# Patient Record
Sex: FEMALE | Race: BLACK OR AFRICAN AMERICAN | Employment: OTHER | URBAN - METROPOLITAN AREA
[De-identification: names, ages, dates, MRNs, and addresses within clinical notes are randomized per-mention and may not be internally consistent; named-entity substitution may affect disease eponyms.]

---

## 2019-02-10 ENCOUNTER — APPOINTMENT (OUTPATIENT)
Dept: GENERAL RADIOLOGY | Age: 84
End: 2019-02-10
Attending: EMERGENCY MEDICINE
Payer: MEDICARE

## 2019-02-10 ENCOUNTER — HOSPITAL ENCOUNTER (EMERGENCY)
Age: 84
Discharge: HOME | End: 2019-02-10
Attending: EMERGENCY MEDICINE
Payer: MEDICARE

## 2019-02-10 VITALS
OXYGEN SATURATION: 100 % | DIASTOLIC BLOOD PRESSURE: 46 MMHG | WEIGHT: 249 LBS | HEART RATE: 79 BPM | SYSTOLIC BLOOD PRESSURE: 129 MMHG | TEMPERATURE: 99.3 F | BODY MASS INDEX: 40.02 KG/M2 | HEIGHT: 66 IN | RESPIRATION RATE: 16 BRPM

## 2019-02-10 DIAGNOSIS — M10.9 ACUTE GOUT OF RIGHT KNEE, UNSPECIFIED CAUSE: Primary | ICD-10-CM

## 2019-02-10 LAB
ALBUMIN SERPL-MCNC: 2.8 G/DL (ref 3.5–5)
ALBUMIN/GLOB SERPL: 0.5 {RATIO} (ref 1.1–2.2)
ALP SERPL-CCNC: 84 U/L (ref 45–117)
ALT SERPL-CCNC: 14 U/L (ref 12–78)
ANION GAP SERPL CALC-SCNC: 9 MMOL/L (ref 5–15)
APPEARANCE SNV: ABNORMAL
AST SERPL-CCNC: 12 U/L (ref 15–37)
BASOPHILS # BLD: 0 K/UL (ref 0–0.1)
BASOPHILS NFR BLD: 0 % (ref 0–1)
BILIRUB SERPL-MCNC: 0.5 MG/DL (ref 0.2–1)
BODY FLD TYPE: NORMAL
BUN SERPL-MCNC: 22 MG/DL (ref 6–20)
BUN/CREAT SERPL: 22 (ref 12–20)
CALCIUM SERPL-MCNC: 8.3 MG/DL (ref 8.5–10.1)
CHLORIDE SERPL-SCNC: 104 MMOL/L (ref 97–108)
CO2 SERPL-SCNC: 25 MMOL/L (ref 21–32)
COLOR SNV: YELLOW
CREAT SERPL-MCNC: 1 MG/DL (ref 0.55–1.02)
CRP SERPL HS-MCNC: >9.5 MG/L
CRYSTALS FLD MICRO: ABNORMAL
CRYSTALS FLD MICRO: NORMAL
DIFFERENTIAL METHOD BLD: ABNORMAL
EOSINOPHIL # BLD: 0 K/UL (ref 0–0.4)
EOSINOPHIL NFR BLD: 0 % (ref 0–7)
ERYTHROCYTE [DISTWIDTH] IN BLOOD BY AUTOMATED COUNT: 21.6 % (ref 11.5–14.5)
ERYTHROCYTE [SEDIMENTATION RATE] IN BLOOD: 71 MM/HR (ref 0–30)
GLOBULIN SER CALC-MCNC: 5.3 G/DL (ref 2–4)
GLUCOSE BLD STRIP.AUTO-MCNC: 191 MG/DL (ref 65–100)
GLUCOSE FLD-MCNC: 16 MG/DL
GLUCOSE SERPL-MCNC: 177 MG/DL (ref 65–100)
HCT VFR BLD AUTO: 25.7 % (ref 35–47)
HGB BLD-MCNC: 8.3 G/DL (ref 11.5–16)
IMM GRANULOCYTES # BLD AUTO: 0.1 K/UL (ref 0–0.04)
IMM GRANULOCYTES NFR BLD AUTO: 1 % (ref 0–0.5)
LYMPHOCYTES # BLD: 0.7 K/UL (ref 0.8–3.5)
LYMPHOCYTES NFR BLD: 5 % (ref 12–49)
LYMPHOCYTES NFR SNV MANUAL: 2 % (ref 0–74)
MCH RBC QN AUTO: 21 PG (ref 26–34)
MCHC RBC AUTO-ENTMCNC: 32.3 G/DL (ref 30–36.5)
MCV RBC AUTO: 64.9 FL (ref 80–99)
MONOCYTES # BLD: 0.8 K/UL (ref 0–1)
MONOCYTES NFR BLD: 6 % (ref 5–13)
MONOCYTES NFR SNV MANUAL: 10 % (ref 0–69)
NEUTROPHILS NFR SNV MANUAL: 88 % (ref 0–24)
NEUTS SEG # BLD: 11.7 K/UL (ref 1.8–8)
NEUTS SEG NFR BLD: 88 % (ref 32–75)
NRBC # BLD: 0 K/UL (ref 0–0.01)
NRBC BLD-RTO: 0 PER 100 WBC
PLATELET # BLD AUTO: 293 K/UL (ref 150–400)
POTASSIUM SERPL-SCNC: 4 MMOL/L (ref 3.5–5.1)
PROCALCITONIN SERPL-MCNC: 0.1 NG/ML
PROT FLD-MCNC: 4.6 G/DL
PROT SERPL-MCNC: 8.1 G/DL (ref 6.4–8.2)
RBC # BLD AUTO: 3.96 M/UL (ref 3.8–5.2)
RBC # SNV: >100 /CU MM
RBC MORPH BLD: ABNORMAL
SERVICE CMNT-IMP: ABNORMAL
SODIUM SERPL-SCNC: 138 MMOL/L (ref 136–145)
SPECIMEN SOURCE FLD: ABNORMAL
URATE FLD-MCNC: 10.3 MG/DL
WBC # BLD AUTO: 13.3 K/UL (ref 3.6–11)
WBC # SNV: ABNORMAL /CU MM (ref 0–150)

## 2019-02-10 PROCEDURE — 89050 BODY FLUID CELL COUNT: CPT

## 2019-02-10 PROCEDURE — 99284 EMERGENCY DEPT VISIT MOD MDM: CPT

## 2019-02-10 PROCEDURE — 73562 X-RAY EXAM OF KNEE 3: CPT

## 2019-02-10 PROCEDURE — 80053 COMPREHEN METABOLIC PANEL: CPT

## 2019-02-10 PROCEDURE — 85025 COMPLETE CBC W/AUTO DIFF WBC: CPT

## 2019-02-10 PROCEDURE — 86141 C-REACTIVE PROTEIN HS: CPT

## 2019-02-10 PROCEDURE — 84560 ASSAY OF URINE/URIC ACID: CPT

## 2019-02-10 PROCEDURE — 84145 PROCALCITONIN (PCT): CPT

## 2019-02-10 PROCEDURE — 75810000054 HC ARTHOCENTISIS JOINT

## 2019-02-10 PROCEDURE — 74011250637 HC RX REV CODE- 250/637: Performed by: EMERGENCY MEDICINE

## 2019-02-10 PROCEDURE — 82945 GLUCOSE OTHER FLUID: CPT

## 2019-02-10 PROCEDURE — 82962 GLUCOSE BLOOD TEST: CPT

## 2019-02-10 PROCEDURE — 74011250636 HC RX REV CODE- 250/636: Performed by: EMERGENCY MEDICINE

## 2019-02-10 PROCEDURE — 96374 THER/PROPH/DIAG INJ IV PUSH: CPT

## 2019-02-10 PROCEDURE — 85652 RBC SED RATE AUTOMATED: CPT

## 2019-02-10 PROCEDURE — 87077 CULTURE AEROBIC IDENTIFY: CPT

## 2019-02-10 PROCEDURE — 87205 SMEAR GRAM STAIN: CPT

## 2019-02-10 PROCEDURE — 87186 SC STD MICRODIL/AGAR DIL: CPT

## 2019-02-10 PROCEDURE — 89060 EXAM SYNOVIAL FLUID CRYSTALS: CPT

## 2019-02-10 PROCEDURE — 36415 COLL VENOUS BLD VENIPUNCTURE: CPT

## 2019-02-10 RX ORDER — OXYCODONE HYDROCHLORIDE 5 MG/1
TABLET ORAL
Qty: 12 TAB | Refills: 0 | Status: SHIPPED | OUTPATIENT
Start: 2019-02-10 | End: 2019-03-28

## 2019-02-10 RX ORDER — HYDROCHLOROTHIAZIDE 25 MG/1
25 TABLET ORAL DAILY
COMMUNITY
End: 2019-03-28

## 2019-02-10 RX ORDER — MORPHINE SULFATE 2 MG/ML
4 INJECTION, SOLUTION INTRAMUSCULAR; INTRAVENOUS ONCE
Status: COMPLETED | OUTPATIENT
Start: 2019-02-10 | End: 2019-02-10

## 2019-02-10 RX ORDER — CARVEDILOL 25 MG/1
25 TABLET ORAL 2 TIMES DAILY WITH MEALS
COMMUNITY

## 2019-02-10 RX ORDER — LIDOCAINE HYDROCHLORIDE AND EPINEPHRINE 10; 10 MG/ML; UG/ML
1.5 INJECTION, SOLUTION INFILTRATION; PERINEURAL AS NEEDED
Status: DISCONTINUED | OUTPATIENT
Start: 2019-02-10 | End: 2019-02-10 | Stop reason: HOSPADM

## 2019-02-10 RX ORDER — OXYCODONE HYDROCHLORIDE 5 MG/1
5 TABLET ORAL
Status: COMPLETED | OUTPATIENT
Start: 2019-02-10 | End: 2019-02-10

## 2019-02-10 RX ORDER — LISINOPRIL 40 MG/1
40 TABLET ORAL DAILY
COMMUNITY
End: 2019-03-28

## 2019-02-10 RX ORDER — DICLOFENAC SODIUM 10 MG/G
2 GEL TOPICAL 4 TIMES DAILY
Qty: 100 G | Refills: 0 | Status: SHIPPED | OUTPATIENT
Start: 2019-02-10

## 2019-02-10 RX ADMIN — MORPHINE SULFATE 4 MG: 2 INJECTION, SOLUTION INTRAMUSCULAR; INTRAVENOUS at 09:52

## 2019-02-10 RX ADMIN — OXYCODONE HYDROCHLORIDE 5 MG: 5 TABLET ORAL at 11:47

## 2019-02-10 NOTE — ED PROVIDER NOTES
EMERGENCY DEPARTMENT HISTORY AND PHYSICAL EXAM 
 
 
Date: 2/10/2019 Patient Name: Natanael Win History of Presenting Illness Chief Complaint Patient presents with  Leg Pain R leg x 2 days History Provided By: Patient HPI: Natanael Win, 80 y.o. female with PMHx significant for HLD, DM, and MI,  Presents via wheelchair to the ED with cc of worsening R leg pain and knee pain x 3days. Pt states she normally uses a cane but is unable to walk today due to the pain. Pt states she was seen by [facility] and had an US of her leg yesterday which showed no clot. Pt was given hydrocodone for her pain but it has provided little relief. Pt denies any TORO. Pt denies any numbness, weakness, fever, or chills. Pt denies history of gout. There are no other complaints, changes, or physical findings at this time. PCP: Dorothea Infante NP No current facility-administered medications on file prior to encounter. Current Outpatient Medications on File Prior to Encounter Medication Sig Dispense Refill  carvedilol (COREG) 25 mg tablet Take 25 mg by mouth two (2) times daily (with meals).  lisinopril (PRINIVIL, ZESTRIL) 40 mg tablet Take 40 mg by mouth daily.  amlodipine besylate/benazepril (AMLODIPINE-BENAZEPRIL PO) Take  by mouth.  hydroCHLOROthiazide (HYDRODIURIL) 25 mg tablet Take 25 mg by mouth daily.  insulin lispro protamine/insulin lispro (HUMALOG MIX 75/25) 100 unit/mL (75-25) injection 30 Units by SubCUTAneous route Before breakfast and dinner. 1 Vial 0  
 atorvastatin (LIPITOR) 20 mg tablet Take 20 mg by mouth daily.  levothyroxine (SYNTHROID) 175 mcg tablet Take 175 mcg by mouth Daily (before breakfast).  aspirin delayed-release 81 mg tablet Take 81 mg by mouth daily. Past History Past Medical History: 
Past Medical History:  
Diagnosis Date  Diabetes ([address]  Heart attack [facility]) 2009  Hyperlipidemia  Hypertension Past Surgical History: 
Past Surgical History:  
Procedure Laterality Date  HX THYROIDECTOMY  2005 Family History: 
Family History Problem Relation Age of Onset  Cancer Mother  Cancer Father  Cancer Sister  Heart Disease Sister  Diabetes Other   
     multiple family members Social History: 
Social History Tobacco Use  Smoking status: Never Smoker Substance Use Topics  Alcohol use: No  
 Drug use: No  
 
 
Allergies: 
No Known Allergies Review of Systems Review of Systems Constitutional: Negative for chills and fever. HENT: Negative for congestion and sore throat. Eyes: Negative for visual disturbance. Respiratory: Negative for cough and shortness of breath. Cardiovascular: Negative for chest pain and leg swelling. Gastrointestinal: Negative for abdominal pain, blood in stool, diarrhea and nausea. Endocrine: Negative for polyuria. Genitourinary: Negative for dysuria, flank pain, vaginal bleeding and vaginal discharge. Musculoskeletal: Positive for arthralgias (R leg) and gait problem. Negative for myalgias. Skin: Negative for rash. Allergic/Immunologic: Negative for immunocompromised state. Neurological: Negative for weakness and headaches. Psychiatric/Behavioral: Negative for confusion. Physical Exam  
Physical Exam  
Constitutional: She is oriented to person, place, and time. She appears well-developed and well-nourished. HENT:  
Head: Normocephalic and atraumatic. Moist mucous membranes Eyes: Conjunctivae are normal. Pupils are equal, round, and reactive to light. Right eye exhibits no discharge. Left eye exhibits no discharge. Neck: Normal range of motion. Neck supple. No tracheal deviation present. Cardiovascular: Normal rate, regular rhythm and normal heart sounds. No murmur heard.  
Pulmonary/Chest: Effort normal and breath sounds normal. No respiratory distress. She has no wheezes. She has no rales. Abdominal: Soft. Bowel sounds are normal. There is no tenderness. There is no rebound and no guarding. Musculoskeletal: Normal range of motion. She exhibits no edema, tenderness or deformity. +Effusion of R knee Limited ROM Pulses in DP and PT on right obtained via US. 1+ DP and PT pulses in L Right Knee is hypersensitive to light touch Skin is warm but not erythematous\ Feet are warm with normal capillary refill. Neurological: She is alert and oriented to person, place, and time. Skin: Skin is warm and dry. No rash noted. No erythema. Psychiatric: Her behavior is normal.  
Nursing note and vitals reviewed. Diagnostic Study Results Labs - Recent Results (from the past 12 hour(s)) CBC WITH AUTOMATED DIFF Collection Time: 02/10/19  8:19 AM  
Result Value Ref Range WBC 13.3 (H) 3.6 - 11.0 K/uL  
 RBC 3.96 3.80 - 5.20 M/uL HGB 8.3 (L) 11.5 - 16.0 g/dL HCT 25.7 (L) 35.0 - 47.0 % MCV 64.9 (L) 80.0 - 99.0 FL  
 MCH 21.0 (L) 26.0 - 34.0 PG  
 MCHC 32.3 30.0 - 36.5 g/dL RDW 21.6 (H) 11.5 - 14.5 % PLATELET 110 439 - 604 K/uL NRBC 0.0 0  WBC ABSOLUTE NRBC 0.00 0.00 - 0.01 K/uL NEUTROPHILS 88 (H) 32 - 75 % LYMPHOCYTES 5 (L) 12 - 49 % MONOCYTES 6 5 - 13 % EOSINOPHILS 0 0 - 7 % BASOPHILS 0 0 - 1 % IMMATURE GRANULOCYTES 1 (H) 0.0 - 0.5 % ABS. NEUTROPHILS 11.7 (H) 1.8 - 8.0 K/UL  
 ABS. LYMPHOCYTES 0.7 (L) 0.8 - 3.5 K/UL  
 ABS. MONOCYTES 0.8 0.0 - 1.0 K/UL  
 ABS. EOSINOPHILS 0.0 0.0 - 0.4 K/UL  
 ABS. BASOPHILS 0.0 0.0 - 0.1 K/UL  
 ABS. IMM. GRANS. 0.1 (H) 0.00 - 0.04 K/UL  
 DF AUTOMATED    
 RBC COMMENTS ANISOCYTOSIS 2+ 
    
 RBC COMMENTS MICROCYTOSIS 2+ 
    
 RBC COMMENTS TARGET CELLS 2+ 
    
 RBC COMMENTS SCHISTOCYTES PRESENT 
    
SED RATE (ESR) Collection Time: 02/10/19  8:19 AM  
Result Value Ref Range Sed rate, automated 71 (H) 0 - 30 mm/hr CRYSTALS, SYNOVIAL FLUID  
 Collection Time: 02/10/19  8:19 AM  
Result Value Ref Range FLUID TYPE(7) SYNOVIAL FLUID Crystals, body fluid MODEATE INTRACELLULAR URIC ACID CRYSTALS SEEN WITH POLARIZED LIGHT  
CULTURE, BODY FLUID W GRAM STAIN Collection Time: 02/10/19  8:19 AM  
Result Value Ref Range Special Requests: NO SPECIAL REQUESTS    
 GRAM STAIN OCCASIONAL WBCS SEEN    
 GRAM STAIN NO ORGANISMS SEEN Culture result: PENDING   
SYNOVIAL FLUID 1 Collection Time: 02/10/19  8:19 AM  
Result Value Ref Range Glucose, body fld. 16 MG/DL Crystals, body fluid DUPLICATE REQUEST Protein total, body fld. 4.6 g/dL Uric acid, fluid 10.3 MG/DL  
 SYNOVIAL FLD SITE synovial   
 SYN FLUID COLOR YELLOW    
 SYN FLUID APPEARANCE CLOUDY SYNOVIAL FLD RBC CT >100 (H) 0 /cu mm SYNOVIAL FLD WBC CT 36,211 (H) 0 - 150 /cu mm SYNOVIAL FLD SEGS 88 (H) 0 - 24 % SYNOVIAL FLD LYMPHS 2 0 - 74 % SYNOVIAL FLD MONOS 10 0 - 69 % METABOLIC PANEL, COMPREHENSIVE Collection Time: 02/10/19 11:31 AM  
Result Value Ref Range Sodium 138 136 - 145 mmol/L Potassium 4.0 3.5 - 5.1 mmol/L Chloride 104 97 - 108 mmol/L  
 CO2 25 21 - 32 mmol/L Anion gap 9 5 - 15 mmol/L Glucose 177 (H) 65 - 100 mg/dL BUN 22 (H) 6 - 20 MG/DL Creatinine 1.00 0.55 - 1.02 MG/DL  
 BUN/Creatinine ratio 22 (H) 12 - 20 GFR est AA >60 >60 ml/min/1.73m2 GFR est non-AA 53 (L) >60 ml/min/1.73m2 Calcium 8.3 (L) 8.5 - 10.1 MG/DL Bilirubin, total 0.5 0.2 - 1.0 MG/DL  
 ALT (SGPT) 14 12 - 78 U/L  
 AST (SGOT) 12 (L) 15 - 37 U/L Alk. phosphatase 84 45 - 117 U/L Protein, total 8.1 6.4 - 8.2 g/dL Albumin 2.8 (L) 3.5 - 5.0 g/dL Globulin 5.3 (H) 2.0 - 4.0 g/dL A-G Ratio 0.5 (L) 1.1 - 2.2 GLUCOSE, POC Collection Time: 02/10/19 12:45 PM  
Result Value Ref Range Glucose (POC) 191 (H) 65 - 100 mg/dL Performed by Tyree Muir Radiologic Studies -  
XR KNEE RT 3 V Final Result IMPRESSION: Trace joint effusion. Significant degenerative changes. CT Results  (Last 48 hours) None CXR Results  (Last 48 hours) None Medical Decision Making I am the first provider for this patient. I reviewed the vital signs, available nursing notes, past medical history, past surgical history, family history and social history. Vital Signs-Reviewed the patient's vital signs. Patient Vitals for the past 12 hrs: 
 Temp Pulse Resp BP SpO2  
02/10/19 1130    165/57 98 % 02/10/19 1100    154/60 100 % 02/10/19 1030    176/56 99 % 02/10/19 1011    183/62 98 % 02/10/19 0800    166/57 100 % 02/10/19 0730    175/56 98 % 02/10/19 0715    165/59 98 % 02/10/19 0703 99.7 °F (37.6 °C) 71 16 171/56 99 % Pulse Oximetry Analysis - 99% on RA Cardiac Monitor:  
Rate: 71 bpm  
 
Records Reviewed: Nursing Notes and Old Medical Records Provider Notes (Medical Decision Making): DDx: Gout, OA, Septic Arthritis ED Course:  
Initial assessment performed. The patients presenting problems have been discussed, and they are in agreement with the care plan formulated and outlined with them. I have encouraged them to ask questions as they arise throughout their visit. Procedure Note - Right Knee Arthrocentesis:  
9:27 AM 
Performed by [employer], DO. Immediately prior to the procedure, the patient was reevaluated and found suitable for the planned procedure and any planned medications. Immediately prior to the procedure a time out was called to verify the correct patient, procedure, equipment, staff, and marking as appropriate. Indication for procedure: Unexplained joint effusion Approach: medial 
The site prepped with Betadine. Sterile field established. Anesthesia was obtained with 5mLs of Lidocaine 1% with epinephrine.  knee joint was entered, using a 18 gauge needle, and 25 cc's of straw colored/turbid fluid was withdrawn and sent for aerobic culture, anaerobic culture, cell count & diff, crystal analysis and gram stain. Estimated blood loss: 1cc The procedure took 1-15 minutes, and pt tolerated well. 200 Discussed synovial fluid with Dr. Rufina Rae who will discuss with Dr. Catherine Jang. Consultant believes fluid likely inflammatory from gout. 1:46 PM 
Synovial fluid was analyzed, trace WBCs but no microorganisms found. Critical Care Time:  
0 Disposition: 
DISCHARGE NOTE 
1:50 PM 
The patient has been re-evaluated and is ready for discharge. Reviewed available results with patient. Counseled pt on diagnosis and care plan. Pt has expressed understanding, and all questions have been answered. Pt agrees with plan and agrees to F/U as recommended, or return to the ED if their sxs worsen. Discharge instructions have been provided and explained to the pt, along with reasons to return to the ED. PLAN: 
1. Current Discharge Medication List  
  
 
2. Follow-up Information None Return to ED if worse Diagnosis Clinical Impression: 1. Acute gout of right knee, unspecified cause Attestations: This note is prepared by Jaden Scott, acting as Scribe for Micaela Dickson DO. The scribe's documentation has been prepared under my direction and personally reviewed by me in its entirety.  I confirm that the note above accurately reflects all work, treatment, procedures, and medical decision making performed by me, Micaela Dickson DO

## 2019-02-10 NOTE — DISCHARGE INSTRUCTIONS
Patient Education        Gout: Care Instructions  Your Care Instructions    Gout is a form of arthritis caused by a buildup of uric acid crystals in a joint. It causes sudden attacks of pain, swelling, redness, and stiffness, usually in one joint, especially the big toe. Gout usually comes on without a cause. But it can be brought on by drinking alcohol (especially beer) or eating seafood and red meat. Taking certain medicines, such as diuretics or aspirin, also can bring on an attack of gout. Taking your medicines as prescribed and following up with your doctor regularly can help you avoid gout attacks in the future. Follow-up care is a key part of your treatment and safety. Be sure to make and go to all appointments, and call your doctor if you are having problems. It's also a good idea to know your test results and keep a list of the medicines you take. How can you care for yourself at home? · If the joint is swollen, put ice or a cold pack on the area for 10 to 20 minutes at a time. Put a thin cloth between the ice and your skin. · Prop up the sore limb on a pillow when you ice it or anytime you sit or lie down during the next 3 days. Try to keep it above the level of your heart. This will help reduce swelling. · Rest sore joints. Avoid activities that put weight or strain on the joints for a few days. Take short rest breaks from your regular activities during the day. · Take your medicines exactly as prescribed. Call your doctor if you think you are having a problem with your medicine. · Take pain medicines exactly as directed. ? If the doctor gave you a prescription medicine for pain, take it as prescribed. ? If you are not taking a prescription pain medicine, ask your doctor if you can take an over-the-counter medicine. · Eat less seafood and red meat. · Check with your doctor before drinking alcohol. · Losing weight, if you are overweight, may help reduce attacks of gout.  But do not go on a \"crash diet. \" Losing a lot of weight in a short amount of time can cause a gout attack. When should you call for help? Call your doctor now or seek immediate medical care if:    · You have a fever.     · The joint is so painful you cannot use it.     · You have sudden, unexplained swelling, redness, warmth, or severe pain in one or more joints.    Watch closely for changes in your health, and be sure to contact your doctor if:    · You have joint pain.     · Your symptoms get worse or are not improving after 2 or 3 days. Where can you learn more? Go to http://miguel angel-kishan.info/. Enter W163 in the search box to learn more about \"Gout: Care Instructions. \"  Current as of: Carla 10, 2018  Content Version: 11.9  © 8941-7305 [employer]. Care instructions adapted under license by [employer] (which disclaims liability or warranty for this information). If you have questions about a medical condition or this instruction, always ask your healthcare professional. [address] any warranty or liability for your use of this information.

## 2019-02-10 NOTE — ED NOTES
Patient discharged by Dr. Shorty Beaulieu. Patient provided with discharge instructions Rx and instructions on follow up care. Patient out of ED via wheelchair accompanied by family.

## 2019-02-10 NOTE — ED TRIAGE NOTES
Patient presents as a transfer from [facility] in [place]. Patient with right leg pain x2 days, was treated with norco at [facility] that was ineffective in treating her pain.

## 2019-02-10 NOTE — ED NOTES
Bedside and Verbal shift change report given to Hernán Hill RN (oncoming nurse) by Shelly Carbajal RN (offgoing nurse). Report included the following information SBAR, Kardex, ED Summary, MAR, Accordion and Recent Results.

## 2019-02-10 NOTE — ED NOTES
Bedside shift change report given to Kathleen Woodall RN (oncoming nurse) by Aysha Albarran RN (offgoing nurse). Report included the following information SBAR, Kardex, ED Summary, Procedure Summary, MAR and Recent Results.

## 2019-02-10 NOTE — ED NOTES
Pt arrived from EMS from home, Pt c/o R leg pain (mainly around knee). Pt states pain is 10/10 sharp, pain is worse when moving, pt unable to bear weight. Pt seen at other hospital 2 days ago, no relief with medications. Pt on monitor, resting in position of comfort

## 2019-02-17 RX ORDER — CIPROFLOXACIN 500 MG/1
500 TABLET ORAL 2 TIMES DAILY
Qty: 14 TAB | Refills: 0 | Status: SHIPPED | OUTPATIENT
Start: 2019-02-17 | End: 2019-02-24

## 2019-02-24 LAB
BACTERIA SPEC CULT: ABNORMAL
GRAM STN SPEC: ABNORMAL
GRAM STN SPEC: ABNORMAL
SERVICE CMNT-IMP: ABNORMAL

## 2019-03-23 ENCOUNTER — HOSPITAL ENCOUNTER (INPATIENT)
Age: 84
LOS: 5 days | Discharge: HOME HEALTH | DRG: 291 | End: 2019-03-28
Attending: EMERGENCY MEDICINE | Admitting: FAMILY MEDICINE
Payer: MEDICARE

## 2019-03-23 ENCOUNTER — APPOINTMENT (OUTPATIENT)
Dept: GENERAL RADIOLOGY | Age: 84
DRG: 291 | End: 2019-03-23
Attending: EMERGENCY MEDICINE
Payer: MEDICARE

## 2019-03-23 ENCOUNTER — APPOINTMENT (OUTPATIENT)
Dept: CT IMAGING | Age: 84
DRG: 291 | End: 2019-03-23
Attending: EMERGENCY MEDICINE
Payer: MEDICARE

## 2019-03-23 DIAGNOSIS — J96.01 ACUTE RESPIRATORY FAILURE WITH HYPOXIA (HCC): Primary | ICD-10-CM

## 2019-03-23 DIAGNOSIS — M19.90 ARTHRITIS: ICD-10-CM

## 2019-03-23 DIAGNOSIS — D64.9 CHRONIC ANEMIA: ICD-10-CM

## 2019-03-23 DIAGNOSIS — R06.02 SHORTNESS OF BREATH: ICD-10-CM

## 2019-03-23 DIAGNOSIS — B37.41 YEAST CYSTITIS: ICD-10-CM

## 2019-03-23 DIAGNOSIS — Z71.89 ADVANCED CARE PLANNING/COUNSELING DISCUSSION: ICD-10-CM

## 2019-03-23 DIAGNOSIS — R53.83 FATIGUE, UNSPECIFIED TYPE: ICD-10-CM

## 2019-03-23 DIAGNOSIS — I50.9 ACUTE CONGESTIVE HEART FAILURE, UNSPECIFIED HEART FAILURE TYPE (HCC): ICD-10-CM

## 2019-03-23 PROBLEM — E11.9 DM (DIABETES MELLITUS) (HCC): Status: ACTIVE | Noted: 2019-03-23

## 2019-03-23 LAB
ABO + RH BLD: NORMAL
ALBUMIN SERPL-MCNC: 3.2 G/DL (ref 3.5–5)
ALBUMIN/GLOB SERPL: 0.6 {RATIO} (ref 1.1–2.2)
ALP SERPL-CCNC: 94 U/L (ref 45–117)
ALT SERPL-CCNC: 24 U/L (ref 12–78)
ANION GAP SERPL CALC-SCNC: 8 MMOL/L (ref 5–15)
APPEARANCE UR: CLEAR
ARTERIAL PATENCY WRIST A: ABNORMAL
AST SERPL-CCNC: 18 U/L (ref 15–37)
BACTERIA URNS QL MICRO: ABNORMAL /HPF
BASE EXCESS BLD CALC-SCNC: 1 MMOL/L
BASOPHILS # BLD: 0.1 K/UL (ref 0–0.1)
BASOPHILS NFR BLD: 1 % (ref 0–1)
BDY SITE: ABNORMAL
BILIRUB SERPL-MCNC: 0.4 MG/DL (ref 0.2–1)
BILIRUB UR QL: NEGATIVE
BLOOD GROUP ANTIBODIES SERPL: NORMAL
BNP SERPL-MCNC: 521 PG/ML
BUN SERPL-MCNC: 27 MG/DL (ref 6–20)
BUN/CREAT SERPL: 28 (ref 12–20)
CALCIUM SERPL-MCNC: 8.6 MG/DL (ref 8.5–10.1)
CHLORIDE SERPL-SCNC: 107 MMOL/L (ref 97–108)
CO2 SERPL-SCNC: 26 MMOL/L (ref 21–32)
COLOR UR: ABNORMAL
CREAT SERPL-MCNC: 0.97 MG/DL (ref 0.55–1.02)
DIFFERENTIAL METHOD BLD: ABNORMAL
EOSINOPHIL # BLD: 0.1 K/UL (ref 0–0.4)
EOSINOPHIL NFR BLD: 1 % (ref 0–7)
EPITH CASTS URNS QL MICRO: ABNORMAL /LPF
ERYTHROCYTE [DISTWIDTH] IN BLOOD BY AUTOMATED COUNT: 24.5 % (ref 11.5–14.5)
FLUAV AG NPH QL IA: NEGATIVE
FLUBV AG NOSE QL IA: NEGATIVE
GAS FLOW.O2 O2 DELIVERY SYS: ABNORMAL L/MIN
GLOBULIN SER CALC-MCNC: 5.1 G/DL (ref 2–4)
GLUCOSE BLD STRIP.AUTO-MCNC: 134 MG/DL (ref 65–100)
GLUCOSE SERPL-MCNC: 151 MG/DL (ref 65–100)
GLUCOSE UR STRIP.AUTO-MCNC: NEGATIVE MG/DL
HCO3 BLD-SCNC: 26.3 MMOL/L (ref 22–26)
HCT VFR BLD AUTO: 25.1 % (ref 35–47)
HGB BLD-MCNC: 7.8 G/DL (ref 11.5–16)
HGB UR QL STRIP: ABNORMAL
IMM GRANULOCYTES # BLD AUTO: 0.1 K/UL (ref 0–0.04)
IMM GRANULOCYTES NFR BLD AUTO: 1 % (ref 0–0.5)
KETONES UR QL STRIP.AUTO: NEGATIVE MG/DL
LACTATE SERPL-SCNC: 0.4 MMOL/L (ref 0.4–2)
LEUKOCYTE ESTERASE UR QL STRIP.AUTO: NEGATIVE
LYMPHOCYTES # BLD: 0.9 K/UL (ref 0.8–3.5)
LYMPHOCYTES NFR BLD: 8 % (ref 12–49)
MCH RBC QN AUTO: 20.3 PG (ref 26–34)
MCHC RBC AUTO-ENTMCNC: 31.1 G/DL (ref 30–36.5)
MCV RBC AUTO: 65.4 FL (ref 80–99)
MONOCYTES # BLD: 0.5 K/UL (ref 0–1)
MONOCYTES NFR BLD: 4 % (ref 5–13)
NEUTS SEG # BLD: 10.1 K/UL (ref 1.8–8)
NEUTS SEG NFR BLD: 85 % (ref 32–75)
NITRITE UR QL STRIP.AUTO: NEGATIVE
NRBC # BLD: 0 K/UL (ref 0–0.01)
NRBC BLD-RTO: 0 PER 100 WBC
O2/TOTAL GAS SETTING VFR VENT: 35 %
PCO2 BLD: 46.7 MMHG (ref 35–45)
PEEP RESPIRATORY: 6 CMH2O
PH BLD: 7.36 [PH] (ref 7.35–7.45)
PH UR STRIP: 6.5 [PH] (ref 5–8)
PIP ISTAT,IPIP: 16
PLATELET # BLD AUTO: 261 K/UL (ref 150–400)
PMV BLD AUTO: ABNORMAL FL (ref 8.9–12.9)
PO2 BLD: 105 MMHG (ref 80–100)
POTASSIUM SERPL-SCNC: 4.3 MMOL/L (ref 3.5–5.1)
PROT SERPL-MCNC: 8.3 G/DL (ref 6.4–8.2)
PROT UR STRIP-MCNC: 100 MG/DL
RBC # BLD AUTO: 3.84 M/UL (ref 3.8–5.2)
RBC #/AREA URNS HPF: ABNORMAL /HPF (ref 0–5)
RBC MORPH BLD: ABNORMAL
SAO2 % BLD: 98 % (ref 92–97)
SERVICE CMNT-IMP: ABNORMAL
SODIUM SERPL-SCNC: 141 MMOL/L (ref 136–145)
SP GR UR REFRACTOMETRY: 1.01 (ref 1–1.03)
SPECIMEN EXP DATE BLD: NORMAL
SPECIMEN TYPE: ABNORMAL
SPONTANEOUS TIMED, IST: YES
TOTAL RESP. RATE, ITRR: 22
TROPONIN I SERPL-MCNC: <0.05 NG/ML
UA: UC IF INDICATED,UAUC: ABNORMAL
UROBILINOGEN UR QL STRIP.AUTO: 1 EU/DL (ref 0.2–1)
WBC # BLD AUTO: 11.8 K/UL (ref 3.6–11)
WBC URNS QL MICRO: ABNORMAL /HPF (ref 0–4)
YEAST URNS QL MICRO: PRESENT

## 2019-03-23 PROCEDURE — 74011636637 HC RX REV CODE- 636/637: Performed by: FAMILY MEDICINE

## 2019-03-23 PROCEDURE — 71275 CT ANGIOGRAPHY CHEST: CPT

## 2019-03-23 PROCEDURE — 83880 ASSAY OF NATRIURETIC PEPTIDE: CPT

## 2019-03-23 PROCEDURE — 74011250636 HC RX REV CODE- 250/636: Performed by: FAMILY MEDICINE

## 2019-03-23 PROCEDURE — 87086 URINE CULTURE/COLONY COUNT: CPT

## 2019-03-23 PROCEDURE — 77010033678 HC OXYGEN DAILY

## 2019-03-23 PROCEDURE — 77030013033 HC MSK BPAP/CPAP MMKA -B

## 2019-03-23 PROCEDURE — 84484 ASSAY OF TROPONIN QUANT: CPT

## 2019-03-23 PROCEDURE — 74011636320 HC RX REV CODE- 636/320: Performed by: EMERGENCY MEDICINE

## 2019-03-23 PROCEDURE — 87804 INFLUENZA ASSAY W/OPTIC: CPT

## 2019-03-23 PROCEDURE — 74011250637 HC RX REV CODE- 250/637: Performed by: EMERGENCY MEDICINE

## 2019-03-23 PROCEDURE — 74011636637 HC RX REV CODE- 636/637: Performed by: INTERNAL MEDICINE

## 2019-03-23 PROCEDURE — 36415 COLL VENOUS BLD VENIPUNCTURE: CPT

## 2019-03-23 PROCEDURE — 77030038269 HC DRN EXT URIN PURWCK BARD -A

## 2019-03-23 PROCEDURE — 74011250637 HC RX REV CODE- 250/637: Performed by: FAMILY MEDICINE

## 2019-03-23 PROCEDURE — 94660 CPAP INITIATION&MGMT: CPT

## 2019-03-23 PROCEDURE — 82962 GLUCOSE BLOOD TEST: CPT

## 2019-03-23 PROCEDURE — 74011250636 HC RX REV CODE- 250/636: Performed by: EMERGENCY MEDICINE

## 2019-03-23 PROCEDURE — 85025 COMPLETE CBC W/AUTO DIFF WBC: CPT

## 2019-03-23 PROCEDURE — 80053 COMPREHEN METABOLIC PANEL: CPT

## 2019-03-23 PROCEDURE — 99285 EMERGENCY DEPT VISIT HI MDM: CPT

## 2019-03-23 PROCEDURE — 86900 BLOOD TYPING SEROLOGIC ABO: CPT

## 2019-03-23 PROCEDURE — 93005 ELECTROCARDIOGRAM TRACING: CPT

## 2019-03-23 PROCEDURE — 65660000000 HC RM CCU STEPDOWN

## 2019-03-23 PROCEDURE — 71045 X-RAY EXAM CHEST 1 VIEW: CPT

## 2019-03-23 PROCEDURE — 5A09357 ASSISTANCE WITH RESPIRATORY VENTILATION, LESS THAN 24 CONSECUTIVE HOURS, CONTINUOUS POSITIVE AIRWAY PRESSURE: ICD-10-PCS | Performed by: EMERGENCY MEDICINE

## 2019-03-23 PROCEDURE — 83605 ASSAY OF LACTIC ACID: CPT

## 2019-03-23 PROCEDURE — 81001 URINALYSIS AUTO W/SCOPE: CPT

## 2019-03-23 PROCEDURE — 36600 WITHDRAWAL OF ARTERIAL BLOOD: CPT

## 2019-03-23 PROCEDURE — 96374 THER/PROPH/DIAG INJ IV PUSH: CPT

## 2019-03-23 PROCEDURE — 82803 BLOOD GASES ANY COMBINATION: CPT

## 2019-03-23 RX ORDER — DOCUSATE SODIUM 100 MG/1
100 CAPSULE, LIQUID FILLED ORAL AS NEEDED
Status: DISCONTINUED | OUTPATIENT
Start: 2019-03-23 | End: 2019-03-28 | Stop reason: HOSPADM

## 2019-03-23 RX ORDER — LISINOPRIL 20 MG/1
40 TABLET ORAL DAILY
Status: DISCONTINUED | OUTPATIENT
Start: 2019-03-24 | End: 2019-03-26

## 2019-03-23 RX ORDER — HYDRALAZINE HYDROCHLORIDE 20 MG/ML
10 INJECTION INTRAMUSCULAR; INTRAVENOUS
Status: DISCONTINUED | OUTPATIENT
Start: 2019-03-23 | End: 2019-03-28 | Stop reason: HOSPADM

## 2019-03-23 RX ORDER — INSULIN GLARGINE 100 [IU]/ML
35 INJECTION, SOLUTION SUBCUTANEOUS 2 TIMES DAILY
Status: ON HOLD | COMMUNITY
End: 2019-03-28 | Stop reason: SDUPTHER

## 2019-03-23 RX ORDER — FUROSEMIDE 10 MG/ML
40 INJECTION INTRAMUSCULAR; INTRAVENOUS
Status: COMPLETED | OUTPATIENT
Start: 2019-03-23 | End: 2019-03-23

## 2019-03-23 RX ORDER — SODIUM CHLORIDE 0.9 % (FLUSH) 0.9 %
5-40 SYRINGE (ML) INJECTION AS NEEDED
Status: DISCONTINUED | OUTPATIENT
Start: 2019-03-23 | End: 2019-03-28 | Stop reason: HOSPADM

## 2019-03-23 RX ORDER — INSULIN LISPRO 100 [IU]/ML
INJECTION, SOLUTION INTRAVENOUS; SUBCUTANEOUS EVERY 6 HOURS
Status: DISCONTINUED | OUTPATIENT
Start: 2019-03-23 | End: 2019-03-24

## 2019-03-23 RX ORDER — ASPIRIN 81 MG/1
81 TABLET ORAL DAILY
Status: DISCONTINUED | OUTPATIENT
Start: 2019-03-24 | End: 2019-03-27

## 2019-03-23 RX ORDER — HEPARIN SODIUM 5000 [USP'U]/ML
7500 INJECTION, SOLUTION INTRAVENOUS; SUBCUTANEOUS EVERY 8 HOURS
Status: DISCONTINUED | OUTPATIENT
Start: 2019-03-23 | End: 2019-03-26

## 2019-03-23 RX ORDER — HEPARIN SODIUM 5000 [USP'U]/ML
5000 INJECTION, SOLUTION INTRAVENOUS; SUBCUTANEOUS EVERY 8 HOURS
Status: DISCONTINUED | OUTPATIENT
Start: 2019-03-23 | End: 2019-03-23 | Stop reason: SDUPTHER

## 2019-03-23 RX ORDER — ACETAMINOPHEN 325 MG/1
650 TABLET ORAL
Status: DISCONTINUED | OUTPATIENT
Start: 2019-03-23 | End: 2019-03-24

## 2019-03-23 RX ORDER — DEXTROSE 50 % IN WATER (D50W) INTRAVENOUS SYRINGE
25-50 AS NEEDED
Status: DISCONTINUED | OUTPATIENT
Start: 2019-03-23 | End: 2019-03-28 | Stop reason: HOSPADM

## 2019-03-23 RX ORDER — HEPARIN SODIUM 5000 [USP'U]/ML
5000 INJECTION, SOLUTION INTRAVENOUS; SUBCUTANEOUS EVERY 8 HOURS
Status: DISCONTINUED | OUTPATIENT
Start: 2019-03-23 | End: 2019-03-23 | Stop reason: DRUGHIGH

## 2019-03-23 RX ORDER — SODIUM CHLORIDE 0.9 % (FLUSH) 0.9 %
10 SYRINGE (ML) INJECTION
Status: COMPLETED | OUTPATIENT
Start: 2019-03-23 | End: 2019-03-23

## 2019-03-23 RX ORDER — INSULIN GLARGINE 100 [IU]/ML
25 INJECTION, SOLUTION SUBCUTANEOUS 2 TIMES DAILY
Status: DISCONTINUED | OUTPATIENT
Start: 2019-03-23 | End: 2019-03-28 | Stop reason: HOSPADM

## 2019-03-23 RX ORDER — FLUCONAZOLE 100 MG/1
150 TABLET ORAL
Status: COMPLETED | OUTPATIENT
Start: 2019-03-23 | End: 2019-03-23

## 2019-03-23 RX ORDER — MAGNESIUM SULFATE 100 %
4 CRYSTALS MISCELLANEOUS AS NEEDED
Status: DISCONTINUED | OUTPATIENT
Start: 2019-03-23 | End: 2019-03-28 | Stop reason: HOSPADM

## 2019-03-23 RX ORDER — CARVEDILOL 12.5 MG/1
25 TABLET ORAL 2 TIMES DAILY WITH MEALS
Status: DISCONTINUED | OUTPATIENT
Start: 2019-03-23 | End: 2019-03-28 | Stop reason: HOSPADM

## 2019-03-23 RX ORDER — ATORVASTATIN CALCIUM 20 MG/1
20 TABLET, FILM COATED ORAL DAILY
Status: DISCONTINUED | OUTPATIENT
Start: 2019-03-24 | End: 2019-03-28 | Stop reason: HOSPADM

## 2019-03-23 RX ORDER — SODIUM CHLORIDE 0.9 % (FLUSH) 0.9 %
5-40 SYRINGE (ML) INJECTION AS NEEDED
Status: DISCONTINUED | OUTPATIENT
Start: 2019-03-23 | End: 2019-03-25 | Stop reason: SDUPTHER

## 2019-03-23 RX ORDER — SODIUM CHLORIDE 0.9 % (FLUSH) 0.9 %
5-40 SYRINGE (ML) INJECTION EVERY 8 HOURS
Status: DISCONTINUED | OUTPATIENT
Start: 2019-03-23 | End: 2019-03-25 | Stop reason: SDUPTHER

## 2019-03-23 RX ORDER — FUROSEMIDE 10 MG/ML
20 INJECTION INTRAMUSCULAR; INTRAVENOUS 2 TIMES DAILY
Status: DISCONTINUED | OUTPATIENT
Start: 2019-03-23 | End: 2019-03-24

## 2019-03-23 RX ORDER — SODIUM CHLORIDE 0.9 % (FLUSH) 0.9 %
5-40 SYRINGE (ML) INJECTION EVERY 8 HOURS
Status: DISCONTINUED | OUTPATIENT
Start: 2019-03-23 | End: 2019-03-28 | Stop reason: HOSPADM

## 2019-03-23 RX ADMIN — Medication 10 ML: at 16:55

## 2019-03-23 RX ADMIN — HYDRALAZINE HYDROCHLORIDE 10 MG: 20 INJECTION INTRAMUSCULAR; INTRAVENOUS at 16:55

## 2019-03-23 RX ADMIN — Medication 10 ML: at 23:55

## 2019-03-23 RX ADMIN — Medication 10 ML: at 09:34

## 2019-03-23 RX ADMIN — FLUCONAZOLE 150 MG: 100 TABLET ORAL at 08:52

## 2019-03-23 RX ADMIN — INSULIN GLARGINE 25 UNITS: 100 INJECTION, SOLUTION SUBCUTANEOUS at 23:54

## 2019-03-23 RX ADMIN — HEPARIN SODIUM 7500 UNITS: 5000 INJECTION INTRAVENOUS; SUBCUTANEOUS at 18:19

## 2019-03-23 RX ADMIN — FUROSEMIDE 40 MG: 10 INJECTION, SOLUTION INTRAMUSCULAR; INTRAVENOUS at 08:53

## 2019-03-23 RX ADMIN — CARVEDILOL 25 MG: 12.5 TABLET, FILM COATED ORAL at 18:20

## 2019-03-23 RX ADMIN — Medication 10 ML: at 08:53

## 2019-03-23 RX ADMIN — INSULIN LISPRO 2 UNITS: 100 INJECTION, SOLUTION INTRAVENOUS; SUBCUTANEOUS at 23:53

## 2019-03-23 RX ADMIN — FUROSEMIDE 20 MG: 10 INJECTION, SOLUTION INTRAMUSCULAR; INTRAVENOUS at 18:19

## 2019-03-23 RX ADMIN — IOPAMIDOL 100 ML: 755 INJECTION, SOLUTION INTRAVENOUS at 09:34

## 2019-03-23 NOTE — ROUTINE PROCESS
TRANSFER - OUT REPORT: 
 
Verbal report given to Judy on Angélica Claros  being transferred to [address] [place] [address] for routine progression of care Report consisted of patients Situation, Background, Assessment and  
Recommendations(SBAR). Information from the following report(s) SBAR, Kardex, ED Summary and MAR was reviewed with the receiving nurse. Lines:  
Peripheral IV 03/23/19 Left Antecubital (Active) Site Assessment Clean, dry, & intact 3/23/2019  6:18 AM  
Phlebitis Assessment 0 3/23/2019  6:18 AM  
Infiltration Assessment 0 3/23/2019  6:18 AM  
Dressing Status Clean, dry, & intact 3/23/2019  6:18 AM  
Dressing Type Tape;Transparent 3/23/2019  6:18 AM  
  
 
Opportunity for questions and clarification was provided. Patient transported with: 
 Registered Nurse

## 2019-03-23 NOTE — H&P
Hospitalist Admission NoteNAME: Ricki Everett :  1935 MRN:  [id number] Date/Time:  3/23/2019 9:46 AM 
 
Patient PCP: Mirela Corbett NP 
______________________________________________________________________ Given the patient's current clinical presentation, I have a high level of concern for decompensation if discharged from the emergency department. Complex decision making was performed, which includes reviewing the patient's available past medical records, laboratory results, and x-ray films. My assessment of this patient's clinical condition and my plan of care is as follows. Assessment / Plan: Active Problems: 
  CHF (congestive heart failure) ([facility] [address] (3/23/2019) Slight BNP elevation, need ECHO, cardiology consult, continue diuretics , HTN management DM (diabetes mellitus) ([facility] [address] (3/23/2019) Hyperglycemic, continue Lantus and SSI Acute hypoxemic respiratory failure ([facility] [address] (3/23/2019) Need further evaluation, ECHO ordered CXR with interstitial edema, no pneumonia, on previous admission she has hypertensive emergency and no PMH of CHF, continue monitoring, cardiology consult Anemia: Hb stable, FOBT check Code Status: full code Surrogate Decision Maker: DVT Prophylaxis: Heparin GI Prophylaxis: not indicated Baseline: walks with walker and cane Subjective: CHIEF COMPLAINT: shortness of breath HISTORY OF PRESENT ILLNESS:    
Akin Glez is a 80 y.o.  female with PMH of DM, who presents with worsening shortness of breath. Patient cannot give detailed history as on BiPaP, she call Nephew for worsening dyspnea early this morning, she was doing ok last night, progressively worse and severe  when called EMS.  Patient has history of CAD and MI in , lives in [facility] and see cardiology in [place], she see her PCP for DM and HTN management, no history of stroke of cancer, no stent, deneis any chest pain, no flu like symptoms, no lower extremity swelling, taking all meds as prescribed, DM fairly controlled. Patient lives and walks with cane son visit her frequently, In ER patient hypoxic, required BiPaP CXR with pulmonary edema give Lasix, significant improvement We were asked to admit for work up and evaluation of the above problems. Past Medical History:  
Diagnosis Date  Diabetes ([address]  Heart attack [facility]) 2009  Hyperlipidemia  Hypertension Past Surgical History:  
Procedure Laterality Date  HX THYROIDECTOMY  2005 Social History Tobacco Use  Smoking status: Never Smoker  Smokeless tobacco: Never Used Substance Use Topics  Alcohol use: No  
  
 
Family History Problem Relation Age of Onset  Cancer Mother  Cancer Father  Cancer Sister  Heart Disease Sister  Diabetes Other   
     multiple family members Allergies Allergen Reactions  Trazodone Anxiety Prior to Admission medications Medication Sig Start Date End Date Taking? Authorizing Provider  
carvedilol (COREG) 25 mg tablet Take 25 mg by mouth two (2) times daily (with meals). Jonah Jimenez MD  
lisinopril (PRINIVIL, ZESTRIL) 40 mg tablet Take 40 mg by mouth daily. Jonah Jimenez MD  
amlodipine besylate/benazepril (AMLODIPINE-BENAZEPRIL PO) Take  by mouth. Jonah Jimenez MD  
hydroCHLOROthiazide (HYDRODIURIL) 25 mg tablet Take 25 mg by mouth daily. Jonah Jimenez MD  
diclofenac (VOLTAREN) 1 % gel Apply 2 g to affected area four (4) times daily. 2/10/19   Chen Salinas, DO  
oxyCODONE IR (ROXICODONE) 5 mg immediate release tablet 1-2 tabs every 4-6 hours as needed for pain 2/10/19   Chen Salinas, DO  
insulin lispro protamine/insulin lispro (HUMALOG MIX 75/25) 100 unit/mL (75-25) injection 30 Units by SubCUTAneous route Before breakfast and dinner.  8/12/16   Roly Feliz MD  
 atorvastatin (LIPITOR) 20 mg tablet Take 20 mg by mouth daily. Jonah Jimenez MD  
levothyroxine (SYNTHROID) 175 mcg tablet Take 175 mcg by mouth Daily (before breakfast). Jonah Jimenez MD  
aspirin delayed-release 81 mg tablet Take 81 mg by mouth daily. Jonah Jimenez MD  
 
 
REVIEW OF SYSTEMS:    
I am not able to complete the review of systems because: The patient is intubated and sedated The patient has altered mental status due to his acute medical problems The patient has baseline aphasia from prior stroke(s) The patient has baseline dementia and is not reliable historian The patient is in acute medical distress and unable to provide information Total of 12 systems reviewed as follows:   
   POSITIVE= underlined text  Negative = text not underlined General:  fever, chills, sweats, generalized weakness, weight loss/gain,  
   loss of appetite Eyes:    blurred vision, eye pain, loss of vision, double vision ENT:    rhinorrhea, pharyngitis Respiratory:   cough, sputum production, SOB, NEWMAN, wheezing, pleuritic pain  
Cardiology:   chest pain, palpitations, orthopnea, PND, edema, syncope Gastrointestinal:  abdominal pain , N/V, diarrhea, dysphagia, constipation, bleeding Genitourinary:  frequency, urgency, dysuria, hematuria, incontinence Muskuloskeletal :  arthralgia, myalgia, back pain Hematology:  easy bruising, nose or gum bleeding, lymphadenopathy Dermatological: rash, ulceration, pruritis, color change / jaundice Endocrine:   hot flashes or polydipsia Neurological:  headache, dizziness, confusion, focal weakness, paresthesia, Speech difficulties, memory loss, gait difficulty Psychological: Feelings of anxiety, depression, agitation Objective: VITALS:   
Visit Vitals /86 Pulse 74 Temp 98.2 °F (36.8 °C) Resp 22 Ht 5' 4\" (1.626 m) Wt 249 lb (112.9 kg) SpO2 96% BMI 42.74 kg/m² PHYSICAL EXAM: 
 
 General:   On BiPaP, tolerating, no distress HEENT: Atraumatic, anicteric sclerae, pink conjunctivae No oral ulcers, mucosa moist, throat clear, dentition fair Neck:  Supple, symmetrical,  thyroid: non tender Lungs:   Clear to auscultation bilaterally. No Wheezing or Rhonchi. basilar  rales. Chest wall:  No tenderness  No Accessory muscle use. Heart:   Regular  rhythm,  No  murmur   No edema Abdomen:   Soft, non-tender. Not distended. Bowel sounds normal 
Extremities: No cyanosis. No clubbing,   
  Skin turgor normal, Capillary refill normal, Radial dial pulse 2+ Skin:     Not pale. Not Jaundiced  No rashes Psych:  Good insight. Not depressed. Not anxious or agitated. Neurologic: EOMs intact. No facial asymmetry. No aphasia or slurred speech. Symmetrical strength, Sensation grossly intact. Alert and oriented X 4.  
 
_______________________________________________________________________ Care Plan discussed with: 
  Comments Patient Family RN Care Manager Consultant:     
_______________________________________________________________________ Expected  Disposition:  
Home with Family HH/PT/OT/RN   
SNF/LTC   
[facility]   
________________________________________________________________________ TOTAL TIME:  35 Minutes Critical Care Provided     Minutes non procedure based Comments Reviewed previous records  
>50% of visit spent in counseling and coordination of care  Discussion with patient and/or family and questions answered 
  
 
________________________________________________________________________ Signed: Mary Anne Reza MD 
 
Procedures: see electronic medical records for all procedures/Xrays and details which were not copied into this note but were reviewed prior to creation of Plan. LAB DATA REVIEWED:   
Recent Results (from the past 24 hour(s)) EKG, 12 LEAD, INITIAL Collection Time: 03/23/19  6:08 AM  
Result Value Ref Range Ventricular Rate 65 BPM  
 Atrial Rate 65 BPM  
 P-R Interval 162 ms QRS Duration 78 ms Q-T Interval 434 ms QTC Calculation (Bezet) 451 ms Calculated P Axis 55 degrees Calculated R Axis 16 degrees Calculated T Axis 29 degrees Diagnosis Normal sinus rhythm Normal ECG When compared with ECG of 09-AUG-2016 07:24, Nonspecific T wave abnormality no longer evident in Lateral leads CBC WITH AUTOMATED DIFF Collection Time: 03/23/19  6:10 AM  
Result Value Ref Range WBC 11.8 (H) 3.6 - 11.0 K/uL  
 RBC 3.84 3.80 - 5.20 M/uL HGB 7.8 (L) 11.5 - 16.0 g/dL HCT 25.1 (L) 35.0 - 47.0 % MCV 65.4 (L) 80.0 - 99.0 FL  
 MCH 20.3 (L) 26.0 - 34.0 PG  
 MCHC 31.1 30.0 - 36.5 g/dL RDW 24.5 (H) 11.5 - 14.5 % PLATELET 986 110 - 489 K/uL MPV ABNORMAL 8.9 - 12.9 FL  
 NRBC 0.0 0  WBC ABSOLUTE NRBC 0.00 0.00 - 0.01 K/uL NEUTROPHILS 85 (H) 32 - 75 % LYMPHOCYTES 8 (L) 12 - 49 % MONOCYTES 4 (L) 5 - 13 % EOSINOPHILS 1 0 - 7 % BASOPHILS 1 0 - 1 % IMMATURE GRANULOCYTES 1 (H) 0.0 - 0.5 % ABS. NEUTROPHILS 10.1 (H) 1.8 - 8.0 K/UL  
 ABS. LYMPHOCYTES 0.9 0.8 - 3.5 K/UL  
 ABS. MONOCYTES 0.5 0.0 - 1.0 K/UL  
 ABS. EOSINOPHILS 0.1 0.0 - 0.4 K/UL  
 ABS. BASOPHILS 0.1 0.0 - 0.1 K/UL  
 ABS. IMM. GRANS. 0.1 (H) 0.00 - 0.04 K/UL  
 DF AUTOMATED    
 RBC COMMENTS ANISOCYTOSIS 3+ 
    
 RBC COMMENTS HYPOCHROMIA 3+ 
    
 RBC COMMENTS MICROCYTOSIS 2+ 
    
 RBC COMMENTS TARGET CELLS 
PRESENT 
    
METABOLIC PANEL, COMPREHENSIVE Collection Time: 03/23/19  6:10 AM  
Result Value Ref Range Sodium 141 136 - 145 mmol/L Potassium 4.3 3.5 - 5.1 mmol/L Chloride 107 97 - 108 mmol/L  
 CO2 26 21 - 32 mmol/L Anion gap 8 5 - 15 mmol/L Glucose 151 (H) 65 - 100 mg/dL BUN 27 (H) 6 - 20 MG/DL Creatinine 0.97 0.55 - 1.02 MG/DL  
 BUN/Creatinine ratio 28 (H) 12 - 20 GFR est AA >60 >60 ml/min/1.73m2 GFR est non-AA 55 (L) >60 ml/min/1.73m2 Calcium 8.6 8.5 - 10.1 MG/DL Bilirubin, total 0.4 0.2 - 1.0 MG/DL  
 ALT (SGPT) 24 12 - 78 U/L  
 AST (SGOT) 18 15 - 37 U/L Alk. phosphatase 94 45 - 117 U/L Protein, total 8.3 (H) 6.4 - 8.2 g/dL Albumin 3.2 (L) 3.5 - 5.0 g/dL Globulin 5.1 (H) 2.0 - 4.0 g/dL A-G Ratio 0.6 (L) 1.1 - 2.2    
TROPONIN I Collection Time: 03/23/19  6:10 AM  
Result Value Ref Range Troponin-I, Qt. <0.05 <0.05 ng/mL NT-PRO BNP Collection Time: 03/23/19  6:10 AM  
Result Value Ref Range NT pro- (H) <450 PG/ML  
LACTIC ACID Collection Time: 03/23/19  6:10 AM  
Result Value Ref Range Lactic acid 0.4 0.4 - 2.0 MMOL/L  
POC G3 - PUL Collection Time: 03/23/19  6:33 AM  
Result Value Ref Range FIO2 (POC) 35 % pH (POC) 7.359 7.35 - 7.45    
 pCO2 (POC) 46.7 (H) 35.0 - 45.0 MMHG  
 pO2 (POC) 105 (H) 80 - 100 MMHG  
 HCO3 (POC) 26.3 (H) 22 - 26 MMOL/L  
 sO2 (POC) 98 (H) 92 - 97 % Base excess (POC) 1 mmol/L Site RIGHT BRACHIAL Device: BIPAP    
 PEEP/CPAP (POC) 6 cmH2O  
 PIP (POC) 16 Allens test (POC) N/A Specimen type (POC) ARTERIAL Total resp. rate 22 Spontaneous timed YES    
URINALYSIS W/ REFLEX CULTURE Collection Time: 03/23/19  6:48 AM  
Result Value Ref Range Color YELLOW/STRAW Appearance CLEAR CLEAR Specific gravity 1.011 1.003 - 1.030    
 pH (UA) 6.5 5.0 - 8.0 Protein 100 (A) NEG mg/dL Glucose NEGATIVE  NEG mg/dL Ketone NEGATIVE  NEG mg/dL Bilirubin NEGATIVE  NEG Blood TRACE (A) NEG Urobilinogen 1.0 0.2 - 1.0 EU/dL Nitrites NEGATIVE  NEG Leukocyte Esterase NEGATIVE  NEG    
 WBC 10-20 0 - 4 /hpf  
 RBC 0-5 0 - 5 /hpf Epithelial cells FEW FEW /lpf Bacteria 1+ (A) NEG /hpf  
 UA:UC IF INDICATED URINE CULTURE ORDERED (A) CNI Yeast PRESENT (A) NEG    
TYPE & SCREEN Collection Time: 03/23/19  7:55 AM  
Result Value Ref Range Crossmatch Expiration 03/26/2019  ABO/Rh(D) O NEGATIVE   
 Antibody screen NEG   
INFLUENZA A & B AG (RAPID TEST) Collection Time: 03/23/19  7:55 AM  
Result Value Ref Range Influenza A Antigen NEGATIVE  NEG  Influenza B Antigen NEGATIVE  NEG

## 2019-03-23 NOTE — CONSULTS
Consult    NAME: Christiano Arriaga   :  1935   MRN:  [id number]     Date/Time:  3/23/2019 6:14 PM    Patient PCP: Juan Hutchison NP  ________________________________________________________________________     Assessment:     1. Dyspnea, edema, orthopnea presents with pulm edema consistent with CHF  2. Distant hx of CAD with ?cath in the  with no intervention  3. Unknown EF  4. Sinus  5. HTN  6. DM  7. Dyslipidemia  8. Anemia unclear no prior endoscopy  9. CT scan with pulm nodule RLL  10. Obese  11. , lives with family, retired , uses a cane        Plan:     Presents with edema and orthopnea suggestive of CHF. pBNP was low, CXR showed mild pulm edema, CT scan did not show clear pulm edema. No PE, but pulmonary nodule. Is feeling better with diuresis. Was transiently on bipap. Likely has component of chf. No ST changes on ECG, negative enzymes. Sinus    Check echo for EF. 1. Cont asa  2. Cont coreg  3. Cont lisinopril  4. Unclear if also on lotrel  5. Change hCTZ to lasix 20mg iv bid, follow bmp  6. Cont statin    Anemia unclear        [x]        High complexity decision making was performed        Subjective:   CHIEF COMPLAINT: Dyspnea    HISTORY OF PRESENT ILLNESS:       Angélica Claros, 80 y.o. female with PMHx significant for CAD, hypertension, hypercholesterolemia, IDDM, presents via EMS2 to the ED with cc of shortness of breath. Niece reports patient had called her around 4 in the morning saying she was acutely short of breath requesting on call the ambulance. On EMS arrival patient sats were in the 80s, she had labored breathing with audible wheezes. She was given a neb treatment with no improvement. Placed on CPAP.  1 inch of Nitropaste was applied. Patient states she is feeling fine yesterday. Short of breath early this morning, reports mildly increased swelling in her lower legs. Shortness of breath worse when she lays flat.   She reports chest tightness but denies any focal chest pain. Minimal dry cough. Denies any fevers or chills. Reports a remote MI in 2004 with only medical management. Denies any known history of CHF, or COPD.  sHe takes daily baby aspirin     She states her cardiologist is in [place] and she has an appointment with him on Friday. Currently in room. No chest pain. Breathing improved, on room air. We were asked to consult for work up and evaluation of the above problems. Past Medical History:   Diagnosis Date    Diabetes ([facility] [address]     Heart attack ([facility] [address] 2009    Hyperlipidemia     Hypertension       Past Surgical History:   Procedure Laterality Date    HX THYROIDECTOMY  2005     Allergies   Allergen Reactions    Trazodone Anxiety      Meds:  See below  Social History     Tobacco Use    Smoking status: Never Smoker    Smokeless tobacco: Never Used   Substance Use Topics    Alcohol use: No      Family History   Problem Relation Age of Onset    Cancer Mother     Cancer Father     Cancer Sister     Heart Disease Sister     Diabetes Other         multiple family members       REVIEW OF SYSTEMS:     []         Unable to obtain  ROS due to ---   [x]         Total of 12 systems reviewed as follows:     Total of 12 systems reviewed as follows:       POSITIVE= Bold text  Negative = normal text  General:  fever, chills, sweats, generalized weakness, weight loss/gain,      loss of appetite   Eyes:    blurred vision, eye pain, loss of vision, double vision  ENT:    rhinorrhea, pharyngitis   Respiratory:   cough, sputum production, SOB, NEWMNA, wheezing, pleuritic pain   Cardiology:   chest pain, palpitations, orthopnea, PND, edema, syncope   Gastrointestinal:  abdominal pain , N/V, diarrhea, dysphagia, constipation, bleeding   Genitourinary:  frequency, urgency, dysuria, hematuria, incontinence   Muskuloskeletal :  arthralgia, myalgia, back pain  Hematology:  easy bruising, nose or gum bleeding, lymphadenopathy Dermatological: rash, ulceration, pruritis, color change / jaundice  Endocrine:   hot flashes or polydipsia   Neurological:  headache, dizziness, confusion, focal weakness, paresthesia,     Speech difficulties, memory loss, gait difficulty  Psychological: Feelings of anxiety, depression, agitation    Objective:      Physical Exam:    Last 24hrs VS reviewed since prior progress note. Most recent are:    Visit Vitals  /74 (BP 1 Location: Right arm, BP Patient Position: At rest;Sitting)   Pulse 70   Temp 98.4 °F (36.9 °C)   Resp 17   Ht 5' 4\" (1.626 m)   Wt 112.9 kg (249 lb)   SpO2 97%   BMI 42.74 kg/m²       Intake/Output Summary (Last 24 hours) at 3/23/2019 1814  Last data filed at 3/23/2019 1237  Gross per 24 hour   Intake    Output 2700 ml   Net -2700 ml        General Appearance: Well developed, well nourished, alert & oriented x 3,    no acute distress. Ears/Nose/Mouth/Throat: Pupils equal and round, Hearing grossly normal.  Neck: Supple. JVP mildly elevated. Carotids good upstrokes, with no bruit. Chest: Lungs clear to auscultation bilaterally. Cardiovascular: Regular rate and rhythm, S1S2 normal, no murmur, rubs, gallops. Abdomen: Soft, non-tender, bowel sounds are active. No organomegaly. Extremities: +1 edema bilaterally. Femoral pulses +2, Distal Pulses +1. Skin: Warm and dry. Neuro: CN II-XII grossly intact, Strength and sensation grossly intact. Data:      Prior to Admission medications    Medication Sig Start Date End Date Taking? Authorizing Provider   carvedilol (COREG) 25 mg tablet Take 25 mg by mouth two (2) times daily (with meals). Jonah Jimenez MD   lisinopril (PRINIVIL, ZESTRIL) 40 mg tablet Take 40 mg by mouth daily. Jonah Jimenez MD   amlodipine besylate/benazepril (AMLODIPINE-BENAZEPRIL PO) Take  by mouth. Jonah Jimenez MD   hydroCHLOROthiazide (HYDRODIURIL) 25 mg tablet Take 25 mg by mouth daily.     Jonah Jimenez MD   diclofenac (VOLTAREN) 1 % gel Apply 2 g to affected area four (4) times daily. 2/10/19   Pedrito Hightower DO   oxyCODONE IR (ROXICODONE) 5 mg immediate release tablet 1-2 tabs every 4-6 hours as needed for pain 2/10/19   Pedrito Hightower DO   insulin lispro protamine/insulin lispro (HUMALOG MIX 75/25) 100 unit/mL (75-25) injection 30 Units by SubCUTAneous route Before breakfast and dinner. 8/12/16   Chilango Arora MD   atorvastatin (LIPITOR) 20 mg tablet Take 20 mg by mouth daily. Jonah Jimenez MD   levothyroxine (SYNTHROID) 175 mcg tablet Take 175 mcg by mouth Daily (before breakfast). Jonah Jimenez MD   aspirin delayed-release 81 mg tablet Take 81 mg by mouth daily. Jonah Jimenez MD       Recent Results (from the past 24 hour(s))   EKG, 12 LEAD, INITIAL    Collection Time: 03/23/19  6:08 AM   Result Value Ref Range    Ventricular Rate 65 BPM    Atrial Rate 65 BPM    P-R Interval 162 ms    QRS Duration 78 ms    Q-T Interval 434 ms    QTC Calculation (Bezet) 451 ms    Calculated P Axis 55 degrees    Calculated R Axis 16 degrees    Calculated T Axis 29 degrees    Diagnosis       Normal sinus rhythm  Normal ECG  When compared with ECG of 09-AUG-2016 07:24,  Nonspecific T wave abnormality no longer evident in Lateral leads     CBC WITH AUTOMATED DIFF    Collection Time: 03/23/19  6:10 AM   Result Value Ref Range    WBC 11.8 (H) 3.6 - 11.0 K/uL    RBC 3.84 3.80 - 5.20 M/uL    HGB 7.8 (L) 11.5 - 16.0 g/dL    HCT 25.1 (L) 35.0 - 47.0 %    MCV 65.4 (L) 80.0 - 99.0 FL    MCH 20.3 (L) 26.0 - 34.0 PG    MCHC 31.1 30.0 - 36.5 g/dL    RDW 24.5 (H) 11.5 - 14.5 %    PLATELET 939 423 - 763 K/uL    MPV ABNORMAL 8.9 - 12.9 FL    NRBC 0.0 0  WBC    ABSOLUTE NRBC 0.00 0.00 - 0.01 K/uL    NEUTROPHILS 85 (H) 32 - 75 %    LYMPHOCYTES 8 (L) 12 - 49 %    MONOCYTES 4 (L) 5 - 13 %    EOSINOPHILS 1 0 - 7 %    BASOPHILS 1 0 - 1 %    IMMATURE GRANULOCYTES 1 (H) 0.0 - 0.5 %    ABS. NEUTROPHILS 10.1 (H) 1.8 - 8.0 K/UL    ABS. LYMPHOCYTES 0.9 0.8 - 3.5 K/UL    ABS.  MONOCYTES 0.5 0.0 - 1.0 K/UL    ABS. EOSINOPHILS 0.1 0.0 - 0.4 K/UL    ABS. BASOPHILS 0.1 0.0 - 0.1 K/UL    ABS. IMM. GRANS. 0.1 (H) 0.00 - 0.04 K/UL    DF AUTOMATED      RBC COMMENTS ANISOCYTOSIS  3+        RBC COMMENTS HYPOCHROMIA  3+        RBC COMMENTS MICROCYTOSIS  2+        RBC COMMENTS TARGET CELLS  PRESENT       METABOLIC PANEL, COMPREHENSIVE    Collection Time: 03/23/19  6:10 AM   Result Value Ref Range    Sodium 141 136 - 145 mmol/L    Potassium 4.3 3.5 - 5.1 mmol/L    Chloride 107 97 - 108 mmol/L    CO2 26 21 - 32 mmol/L    Anion gap 8 5 - 15 mmol/L    Glucose 151 (H) 65 - 100 mg/dL    BUN 27 (H) 6 - 20 MG/DL    Creatinine 0.97 0.55 - 1.02 MG/DL    BUN/Creatinine ratio 28 (H) 12 - 20      GFR est AA >60 >60 ml/min/1.73m2    GFR est non-AA 55 (L) >60 ml/min/1.73m2    Calcium 8.6 8.5 - 10.1 MG/DL    Bilirubin, total 0.4 0.2 - 1.0 MG/DL    ALT (SGPT) 24 12 - 78 U/L    AST (SGOT) 18 15 - 37 U/L    Alk. phosphatase 94 45 - 117 U/L    Protein, total 8.3 (H) 6.4 - 8.2 g/dL    Albumin 3.2 (L) 3.5 - 5.0 g/dL    Globulin 5.1 (H) 2.0 - 4.0 g/dL    A-G Ratio 0.6 (L) 1.1 - 2.2     TROPONIN I    Collection Time: 03/23/19  6:10 AM   Result Value Ref Range    Troponin-I, Qt. <0.05 <0.05 ng/mL   NT-PRO BNP    Collection Time: 03/23/19  6:10 AM   Result Value Ref Range    NT pro- (H) <450 PG/ML   LACTIC ACID    Collection Time: 03/23/19  6:10 AM   Result Value Ref Range    Lactic acid 0.4 0.4 - 2.0 MMOL/L   POC G3 - PUL    Collection Time: 03/23/19  6:33 AM   Result Value Ref Range    FIO2 (POC) 35 %    pH (POC) 7.359 7.35 - 7.45      pCO2 (POC) 46.7 (H) 35.0 - 45.0 MMHG    pO2 (POC) 105 (H) 80 - 100 MMHG    HCO3 (POC) 26.3 (H) 22 - 26 MMOL/L    sO2 (POC) 98 (H) 92 - 97 %    Base excess (POC) 1 mmol/L    Site RIGHT BRACHIAL      Device: BIPAP      PEEP/CPAP (POC) 6 cmH2O    PIP (POC) 16      Allens test (POC) N/A      Specimen type (POC) ARTERIAL      Total resp.  rate 22      Spontaneous timed YES     URINALYSIS W/ REFLEX CULTURE Collection Time: 03/23/19  6:48 AM   Result Value Ref Range    Color YELLOW/STRAW      Appearance CLEAR CLEAR      Specific gravity 1.011 1.003 - 1.030      pH (UA) 6.5 5.0 - 8.0      Protein 100 (A) NEG mg/dL    Glucose NEGATIVE  NEG mg/dL    Ketone NEGATIVE  NEG mg/dL    Bilirubin NEGATIVE  NEG      Blood TRACE (A) NEG      Urobilinogen 1.0 0.2 - 1.0 EU/dL    Nitrites NEGATIVE  NEG      Leukocyte Esterase NEGATIVE  NEG      WBC 10-20 0 - 4 /hpf    RBC 0-5 0 - 5 /hpf    Epithelial cells FEW FEW /lpf    Bacteria 1+ (A) NEG /hpf    UA:UC IF INDICATED URINE CULTURE ORDERED (A) CNI      Yeast PRESENT (A) NEG     TYPE & SCREEN    Collection Time: 03/23/19  7:55 AM   Result Value Ref Range    Crossmatch Expiration 03/26/2019     ABO/Rh(D) O NEGATIVE     Antibody screen NEG    INFLUENZA A & B AG (RAPID TEST)    Collection Time: 03/23/19  7:55 AM   Result Value Ref Range    Influenza A Antigen NEGATIVE  NEG      Influenza B Antigen NEGATIVE  NEG     GLUCOSE, POC    Collection Time: 03/23/19  5:24 PM   Result Value Ref Range    Glucose (POC) 134 (H) 65 - 100 mg/dL    Performed by Cynthia Martinez (PCT)

## 2019-03-23 NOTE — ED NOTES
Pt D/C from BIPAP with respiratory at bedside. Pt denies any increase in SOB and is maintaining good saturation. Pt assisted up to bedside commode and produced 850 ML of urine. Pt placed back in bed in position of comfort with daughters at bedside.

## 2019-03-23 NOTE — ED NOTES
Pt has been up to bedside commode 5 times in past 2 hours. Pt reports some dizziness upon standing. BP medication held at this time due to patient frequent ambulation to bedside. Purwicks are due to arrive from stocking room and BP medication with be administered once Purwick is able to be placed.

## 2019-03-23 NOTE — PROGRESS NOTES
Pharmacy  Heparin Monitoring Indication: DVT prophylaxis Current Dose: Heparin 5000 units subcutaneously every 8 hours Creatinine Clearance (mL/min): 37.9 ml/min Labs: 
Recent Labs  
  03/23/19 
9671 CREA 0.97 HGB 7.8*  Wt Readings from Last 1 Encounters:  
03/23/19 112.9 kg (249 lb) Ht Readings from Last 1 Encounters:  
03/23/19 162.6 cm (64\") Impression/Plan:  
Change to heparin 7500 units subcutaneously every 8hr per protocol for obese patients with BMI >40 Thanks, Jovna Song, PHARMD

## 2019-03-23 NOTE — PROGRESS NOTES
3:56 PM  
TRANSFER - IN REPORT: 
 
Verbal report received from [address] [place] [address] (name) on Rosalina Loyd  being received from  ED (unit) for routine progression of care Report consisted of patients Situation, Background, Assessment and  
Recommendations(SBAR). Information from the following report(s) Intake/Output and Cardiac Rhythm NSR  was reviewed with the receiving nurse. Opportunity for questions and clarification was provided. Assessment completed upon patients arrival to unit and care assumed. Primary Nurse Gisel Arevalo and Dari, RN performed a dual skin assessment on this patient No impairment noted Ayaan score is 17

## 2019-03-23 NOTE — ED TRIAGE NOTES
ED visit d/t sudden sob - woke up from sleep  - Ems found the pt hypoxic, mild 80s - placed on bipap and given two albuterol nebs with good saturations - pt arrived satting 100% on bipap, maintains increased wob - nitro paste to chest, 1\" - 18 G L AC;

## 2019-03-23 NOTE — ED PROVIDER NOTES
EMERGENCY DEPARTMENT HISTORY AND PHYSICAL EXAM 
 
 
Date: 3/23/2019 Patient Name: Elijah Jean-Baptiste History of Presenting Illness Chief Complaint Patient presents with  Shortness of Breath History Provided By: Patient and EMS, niece HPI: Elijah Jean-Baptiste, 80 y.o. female with PMHx significant for CAD, hypertension, hypercholesterolemia, IDDM, presents via EMS2 to the ED with cc of shortness of breath. Nialanis reports patient had called her around 4 in the morning saying she was acutely short of breath requesting on call the ambulance. On EMS arrival patient sats were in the 80s, she had labored breathing with audible wheezes. She was given a neb treatment with no improvement. Placed on CPAP.  1 inch of Nitropaste was applied. Patient states she is feeling fine yesterday. Short of breath early this morning, reports mildly increased swelling in her lower legs. Shortness of breath worse when she lays flat. She reports chest tightness but denies any focal chest pain. Minimal dry cough. Denies any fevers or chills. Reports a remote MI in 2004 with only medical management. Denies any known history of CHF, or COPD.  sHe takes daily baby aspirin She states her cardiologist is in [place] and she has an appointment with him on Friday. PMHx: Significant for DM, hypertension, CAD, hypercholesterolemia Social Hx: Non-smoker, nondrinker, denies any illicit drug use. There are no other complaints, changes, or physical findings at this time. PCP: Margarito Estrada NP No current facility-administered medications on file prior to encounter. Current Outpatient Medications on File Prior to Encounter Medication Sig Dispense Refill  amLODIPine (NORVASC) 5 mg tablet Take 2.5 mg by mouth daily.  pregabalin (LYRICA) 150 mg capsule Take  by mouth two (2) times a day. Indications: Diabetic Complication causing Injury to some Body Nerves  insulin glargine (LANTUS SOLOSTAR U-100 INSULIN) 100 unit/mL (3 mL) inpn 35 Units by SubCUTAneous route two (2) times a day.  carvedilol (COREG) 25 mg tablet Take 25 mg by mouth two (2) times daily (with meals).  lisinopril (PRINIVIL, ZESTRIL) 40 mg tablet Take 40 mg by mouth daily.  hydroCHLOROthiazide (HYDRODIURIL) 25 mg tablet Take 25 mg by mouth daily.  diclofenac (VOLTAREN) 1 % gel Apply 2 g to affected area four (4) times daily. 100 g 0  
 oxyCODONE IR (ROXICODONE) 5 mg immediate release tablet 1-2 tabs every 4-6 hours as needed for pain 12 Tab 0  
 atorvastatin (LIPITOR) 20 mg tablet Take 20 mg by mouth daily.  levothyroxine (SYNTHROID) 175 mcg tablet Take 175 mcg by mouth Daily (before breakfast).  aspirin delayed-release 81 mg tablet Take 81 mg by mouth daily. Past History Past Medical History: 
Past Medical History:  
Diagnosis Date  Diabetes ([address]  Heart attack [facility]) 2009  Hyperlipidemia  Hypertension Past Surgical History: 
Past Surgical History:  
Procedure Laterality Date  HX THYROIDECTOMY  2005 Family History: 
Family History Problem Relation Age of Onset  Cancer Mother  Cancer Father  Cancer Sister  Heart Disease Sister  Diabetes Other   
     multiple family members Social History: 
Social History Tobacco Use  Smoking status: Never Smoker  Smokeless tobacco: Never Used Substance Use Topics  Alcohol use: No  
 Drug use: No  
 
 
Allergies: Allergies Allergen Reactions  Trazodone Anxiety Review of Systems Review of Systems Reason unable to perform ROS: ROS limited due to physical condition. Constitutional: Negative for activity change, chills and fever. HENT: Negative for congestion and sore throat. Eyes: Negative for pain and redness. Respiratory: Positive for cough, chest tightness and shortness of breath. Cardiovascular: Positive for leg swelling. Negative for chest pain and palpitations. Gastrointestinal: Negative for abdominal pain, diarrhea, nausea and vomiting. Genitourinary: Negative for dysuria, frequency and urgency. Musculoskeletal: Negative for back pain and neck pain. Skin: Negative for rash. Neurological: Negative for syncope, light-headedness and headaches. Psychiatric/Behavioral: Negative for confusion. All other systems reviewed and are negative. Physical Exam  
Physical Exam  
Constitutional: She is oriented to person, place, and time. She appears well-developed and well-nourished. She appears distressed. HENT:  
Head: Normocephalic. Nose: Nose normal.  
Mouth/Throat: Oropharynx is clear and moist. No oropharyngeal exudate. Eyes: Pupils are equal, round, and reactive to light. Conjunctivae are normal. No scleral icterus. Neck: Normal range of motion. Neck supple. No JVD present. No tracheal deviation present. No thyromegaly present. Cardiovascular: Normal rate, regular rhythm and intact distal pulses. Exam reveals no gallop and no friction rub. No murmur heard. Pulmonary/Chest: No stridor. She is in respiratory distress. She has no wheezes. She has rales. On CPAP PTA. Switch to BiPAP on arrival.  Moderately increased work of breathing. Bilateral rales to mid lung fields. Abdominal: Soft. Bowel sounds are normal. She exhibits no distension. There is no tenderness. There is no rebound and no guarding. Musculoskeletal: Normal range of motion. She exhibits edema. 1+ edema up to the knee bilaterally. Lymphadenopathy:  
  She has no cervical adenopathy. Neurological: She is alert and oriented to person, place, and time. No cranial nerve deficit. She exhibits normal muscle tone. Coordination normal.  
Skin: Skin is warm and dry. No rash noted. She is not diaphoretic. No erythema. Psychiatric: She has a normal mood and affect.  Her behavior is normal.  
 Nursing note and vitals reviewed. Diagnostic Study Results Labs - Recent Results (from the past 12 hour(s)) HEMOGLOBIN A1C WITH EAG Collection Time: 03/24/19  4:15 AM  
Result Value Ref Range Hemoglobin A1c 6.3 4.2 - 6.3 % Est. average glucose 134 mg/dL CBC WITH AUTOMATED DIFF Collection Time: 03/24/19  4:15 AM  
Result Value Ref Range WBC 7.9 3.6 - 11.0 K/uL  
 RBC 3.79 (L) 3.80 - 5.20 M/uL HGB 7.7 (L) 11.5 - 16.0 g/dL HCT 24.5 (L) 35.0 - 47.0 % MCV 64.6 (L) 80.0 - 99.0 FL  
 MCH 20.3 (L) 26.0 - 34.0 PG  
 MCHC 31.4 30.0 - 36.5 g/dL RDW 24.1 (H) 11.5 - 14.5 % PLATELET 480 894 - 924 K/uL MPV ABNORMAL 8.9 - 12.9 FL  
 NRBC 0.0 0  WBC ABSOLUTE NRBC 0.00 0.00 - 0.01 K/uL NEUTROPHILS 69 32 - 75 % LYMPHOCYTES 20 12 - 49 % MONOCYTES 9 5 - 13 % EOSINOPHILS 1 0 - 7 % BASOPHILS 1 0 - 1 % IMMATURE GRANULOCYTES 0 0.0 - 0.5 % ABS. NEUTROPHILS 5.4 1.8 - 8.0 K/UL  
 ABS. LYMPHOCYTES 1.6 0.8 - 3.5 K/UL  
 ABS. MONOCYTES 0.7 0.0 - 1.0 K/UL  
 ABS. EOSINOPHILS 0.1 0.0 - 0.4 K/UL  
 ABS. BASOPHILS 0.1 0.0 - 0.1 K/UL  
 ABS. IMM. GRANS. 0.0 0.00 - 0.04 K/UL  
 DF AUTOMATED    
 RBC COMMENTS ANISOCYTOSIS 2+ 
    
 RBC COMMENTS MICROCYTOSIS 2+ 
    
 RBC COMMENTS HYPOCHROMIA 2+ 
    
 RBC COMMENTS TARGET CELLS 
PRESENT 
    
METABOLIC PANEL, BASIC Collection Time: 03/24/19  4:15 AM  
Result Value Ref Range Sodium 142 136 - 145 mmol/L Potassium 3.7 3.5 - 5.1 mmol/L Chloride 105 97 - 108 mmol/L  
 CO2 32 21 - 32 mmol/L Anion gap 5 5 - 15 mmol/L Glucose 131 (H) 65 - 100 mg/dL BUN 26 (H) 6 - 20 MG/DL Creatinine 1.21 (H) 0.55 - 1.02 MG/DL  
 BUN/Creatinine ratio 21 (H) 12 - 20 GFR est AA 52 (L) >60 ml/min/1.73m2 GFR est non-AA 42 (L) >60 ml/min/1.73m2 Calcium 8.8 8.5 - 10.1 MG/DL  
GLUCOSE, POC Collection Time: 03/24/19  6:27 AM  
Result Value Ref Range Glucose (POC) 139 (H) 65 - 100 mg/dL Performed by Haile Bruno GLUCOSE, POC Collection Time: 03/24/19 11:27 AM  
Result Value Ref Range Glucose (POC) 256 (H) 65 - 100 mg/dL Performed by Lola Dimas OCCULT BLOOD, STOOL Collection Time: 03/24/19 12:30 PM  
Result Value Ref Range Occult blood, stool NEGATIVE  NEG Radiologic Studies -  
CTA CHEST W OR W WO CONT Final Result IMPRESSION:  
  
1. Nodule 1.7 x 1.3 cm nodule right lower lobe along the oblique fissure with  
bilateral airspace disease most confluent at the right lung base and septal  
lobular thickening and right pleural effusion. Mediastinal adenopathy. Airspace  
disease suggest infection however the soft tissue nodule and mediastinal  
adenopathy is concerning for underlying neoplasm. Recommend short interim  
follow-up posttreatment to ensure resolution XR CHEST PORT Final Result IMPRESSION:   
1. Pulmonary interstitial edema. 2. Unchanged cardiomegaly. CT Results  (Last 48 hours) 03/23/19 0934  CTA [facility] CONT Final result Impression:  IMPRESSION:  
   
1. Nodule 1.7 x 1.3 cm nodule right lower lobe along the oblique fissure with  
bilateral airspace disease most confluent at the right lung base and septal  
lobular thickening and right pleural effusion. Mediastinal adenopathy. Airspace  
disease suggest infection however the soft tissue nodule and mediastinal  
adenopathy is concerning for underlying neoplasm. Recommend short interim  
follow-up posttreatment to ensure resolution Narrative:  INDICATION: Acute chest pain COMPARISON: March 23, 2019 and August 9, 2016 TECHNIQUE:  2.5 mm axial images were obtained from the bases to the lung apices  
after the intravenous administration of 100 cc of Isovue-370. Three-dimensional  
postprocessing was performed by the technologist with MIP reconstructions.  CT  
dose reduction was achieved through use of a standardized protocol tailored for  
this examination and automatic exposure control for dose modulation. FINDINGS:  
   
THYROID: No nodule. MEDIASTINUM: Multiple enlarged mediastinal nodes new in the interval. A  
representative precarinal node series 2 image 64 measures 1.9 x 1.4 cm WESTLEY: No mass or lymphadenopathy. THORACIC AORTA: Atherosclerotic but no aneurysm MAIN PULMONARY ARTERY: No acute pulmonary embolus TRACHEA/BRONCHI: There is bronchial wall thickening especially in the right  
lower lobe and minimal wall thickening left lower lobe. ESOPHAGUS: No wall thickening or dilatation. HEART: Normal in size. PLEURA: Small pleural effusions LUNGS: There is a 1.7 x 1.3 cm soft tissue nodule in the right lower lobe along  
the fissure. There are patchy areas of airspace disease most confluent in the  
right lower lobe. There is interseptal lobular thickening INCIDENTALLY IMAGED UPPER ABDOMEN: No focal abnormality. BONES: No destructive bone lesion. Diffuse idiopathic skeletal hyperostosis CXR Results  (Last 48 hours) 03/23/19 0641  XR CHEST PORT Final result Impression:  IMPRESSION:   
1. Pulmonary interstitial edema. 2. Unchanged cardiomegaly. Narrative:  EXAM:  XR CHEST PORT INDICATION:   Chest Pain COMPARISON: Chest radiograph 8/9/2016. FINDINGS: AP radiograph of the chest was obtained. Diffuse bilateral interstitial opacities. No pleural effusion or pneumothorax. Unchanged cardiomegaly. Calcifications of the thoracic aorta. No acute osseous  
abnormalities. Medical Decision Making I am the first provider for this patient. I reviewed the vital signs, available nursing notes, past medical history, past surgical history, family history and social history. Vital Signs-Reviewed the patient's vital signs. Patient Vitals for the past 12 hrs: 
 Temp Pulse Resp BP SpO2 03/24/19 1124 98.1 °F (36.7 °C) 65 20 146/74 98 % 03/24/19 0717 98.6 °F (37 °C) 64 20 157/54 98 % 03/24/19 0352 98.1 °F (36.7 °C) 65 20 156/55 98 % ED EKG interpretation:06:08 Rhythm: normal sinus rhythm; and regular . Rate (approx.): 65; Axis: normal; MI Interval: normal; QRS interval: normal ; ST/T wave: normal; normal ekg; This EKG was interpreted by Debora Hyman MD,ED Provider. Records Reviewed: Nursing Notes and Old Medical Records Provider Notes (Medical Decision Making): DDX: CHF, ACS, pneumonia. ED Course:  
Initial assessment performed. The patients presenting problems have been discussed, and they are in agreement with the care plan formulated and outlined with them. I have encouraged them to ask questions as they arise throughout their visit. CRITICAL CARE NOTE : 
 
6:16 AM 
 
 
IMPENDING DETERIORATION -Airway, Respiratory, Cardiovascular and CNS 
 
ASSOCIATED RISK FACTORS - Hypotension, Shock, Hypoxia, Dysrhythmia, Metabolic changes and CNS Decompensation MANAGEMENT- Bedside Assessment and Supervision of Care INTERPRETATION -  Xrays, Blood Gases, ECG and Blood Pressure INTERVENTIONS - hemodynamic mngmt, Metobolic interventions and BiPAP 
 
CASE REVIEW - Hospitalist, Medical Sub-Specialist, Nursing and Family TREATMENT RESPONSE -Improved PERFORMED BY - Self NOTES   : 
 
 
I have spent 50 minutes of critical care time involved in lab review, consultations with specialist, family decision- making, bedside attention and documentation. During this entire length of time I was immediately available to the patient . Critical Care: The reason for providing this level of medical care for this critically ill patient was due to a critical illness that impaired one or more vital organ systems, such that there was a high probability of imminent or life threatening deterioration in the patient's condition. This care involved high complexity decision making to assess, manipulate, and support vital system functions, to treat this degree of vital organ system failure, and to prevent further life threatening deterioration of the patients condition. Priya Brambila MD 
 
 
 
I consulted Patricia Fishman, hospitalist.  He will evaluate the patient for admission. Critical Care Time:  
48 Disposition: 
Admit to hospitalist. 
 
PLAN: 
1. Current Discharge Medication List  
  
 
2. Follow-up Information None Return to ED if worse Diagnosis Clinical Impression: 1. Acute respiratory failure with hypoxia ([address] 2. Acute congestive heart failure, unspecified heart failure type ([address] 3. Chronic anemia 4. Yeast cystitis

## 2019-03-24 ENCOUNTER — APPOINTMENT (OUTPATIENT)
Dept: NON INVASIVE DIAGNOSTICS | Age: 84
DRG: 291 | End: 2019-03-24
Attending: FAMILY MEDICINE
Payer: MEDICARE

## 2019-03-24 PROBLEM — I10 HTN (HYPERTENSION): Chronic | Status: ACTIVE | Noted: 2019-03-24

## 2019-03-24 PROBLEM — E78.5 HYPERLIPIDEMIA: Chronic | Status: ACTIVE | Noted: 2019-03-24

## 2019-03-24 PROBLEM — N18.9 CHRONIC RENAL INSUFFICIENCY: Chronic | Status: ACTIVE | Noted: 2019-03-24

## 2019-03-24 PROBLEM — D64.9 ANEMIA: Chronic | Status: ACTIVE | Noted: 2019-03-24

## 2019-03-24 LAB
ANION GAP SERPL CALC-SCNC: 5 MMOL/L (ref 5–15)
ATRIAL RATE: 65 BPM
BACTERIA SPEC CULT: NORMAL
BASOPHILS # BLD: 0.1 K/UL (ref 0–0.1)
BASOPHILS NFR BLD: 1 % (ref 0–1)
BUN SERPL-MCNC: 26 MG/DL (ref 6–20)
BUN/CREAT SERPL: 21 (ref 12–20)
CALCIUM SERPL-MCNC: 8.8 MG/DL (ref 8.5–10.1)
CALCULATED P AXIS, ECG09: 55 DEGREES
CALCULATED R AXIS, ECG10: 16 DEGREES
CALCULATED T AXIS, ECG11: 29 DEGREES
CC UR VC: NORMAL
CHLORIDE SERPL-SCNC: 105 MMOL/L (ref 97–108)
CO2 SERPL-SCNC: 32 MMOL/L (ref 21–32)
CREAT SERPL-MCNC: 1.21 MG/DL (ref 0.55–1.02)
DIAGNOSIS, 93000: NORMAL
DIFFERENTIAL METHOD BLD: ABNORMAL
ECHO AO ROOT DIAM: 2.67 CM
ECHO AV AREA PEAK VELOCITY: 2.2 CM2
ECHO AV AREA/BSA PEAK VELOCITY: 1 CM2/M2
ECHO AV CUSP MM: 0 CM
ECHO AV PEAK GRADIENT: 13.4 MMHG
ECHO AV PEAK VELOCITY: 183.3 CM/S
ECHO EST RA PRESSURE: 10 MMHG
ECHO LA MAJOR AXIS: 3.63 CM
ECHO LA TO AORTIC ROOT RATIO: 1.36
ECHO LV INTERNAL DIMENSION DIASTOLIC: 4.8 CM (ref 3.9–5.3)
ECHO LV INTERNAL DIMENSION SYSTOLIC: 4.02 CM
ECHO LV IVSD: 1.24 CM (ref 0.6–0.9)
ECHO LV MASS 2D: 232.2 G (ref 67–162)
ECHO LV MASS INDEX 2D: 108.1 G/M2 (ref 43–95)
ECHO LV POSTERIOR WALL DIASTOLIC: 0.98 CM (ref 0.6–0.9)
ECHO LV POSTERIOR WALL SYSTOLIC: 1.31 CM
ECHO LVOT DIAM: 1.87 CM
ECHO LVOT PEAK GRADIENT: 8.8 MMHG
ECHO LVOT PEAK VELOCITY: 148.22 CM/S
ECHO LVOT SV: 113.7 ML
ECHO LVOT VTI: 41.52 CM
ECHO MV A VELOCITY: 101.54 CM/S
ECHO MV AREA PHT: 2.8 CM2
ECHO MV AREA VTI: 2.5 CM2
ECHO MV E DECELERATION TIME (DT): 272 MS
ECHO MV E VELOCITY: 108.12 CM/S
ECHO MV E/A RATIO: 1.06
ECHO MV MAX VELOCITY: 119.9 CM/S
ECHO MV MEAN GRADIENT: 2.4 MMHG
ECHO MV PEAK GRADIENT: 5.8 MMHG
ECHO MV PRESSURE HALF TIME (PHT): 78.9 MS
ECHO MV REGURGITANT PEAK GRADIENT: 8.7 MMHG
ECHO MV REGURGITANT PEAK VELOCITY: 147.46 CM/S
ECHO MV VTI: 45.56 CM
ECHO PULMONARY ARTERY SYSTOLIC PRESSURE (PASP): 50.7 MMHG
ECHO RIGHT VENTRICULAR SYSTOLIC PRESSURE (RVSP): 50.7 MMHG
ECHO TV REGURGITANT MAX VELOCITY: 318.92 CM/S
ECHO TV REGURGITANT PEAK GRADIENT: 40.7 MMHG
EOSINOPHIL # BLD: 0.1 K/UL (ref 0–0.4)
EOSINOPHIL NFR BLD: 1 % (ref 0–7)
ERYTHROCYTE [DISTWIDTH] IN BLOOD BY AUTOMATED COUNT: 24.1 % (ref 11.5–14.5)
EST. AVERAGE GLUCOSE BLD GHB EST-MCNC: 134 MG/DL
GLUCOSE BLD STRIP.AUTO-MCNC: 139 MG/DL (ref 65–100)
GLUCOSE BLD STRIP.AUTO-MCNC: 139 MG/DL (ref 65–100)
GLUCOSE BLD STRIP.AUTO-MCNC: 188 MG/DL (ref 65–100)
GLUCOSE BLD STRIP.AUTO-MCNC: 207 MG/DL (ref 65–100)
GLUCOSE BLD STRIP.AUTO-MCNC: 256 MG/DL (ref 65–100)
GLUCOSE SERPL-MCNC: 131 MG/DL (ref 65–100)
HBA1C MFR BLD: 6.3 % (ref 4.2–6.3)
HCT VFR BLD AUTO: 24.5 % (ref 35–47)
HEMOCCULT STL QL: NEGATIVE
HGB BLD-MCNC: 7.7 G/DL (ref 11.5–16)
IMM GRANULOCYTES # BLD AUTO: 0 K/UL (ref 0–0.04)
IMM GRANULOCYTES NFR BLD AUTO: 0 % (ref 0–0.5)
LYMPHOCYTES # BLD: 1.6 K/UL (ref 0.8–3.5)
LYMPHOCYTES NFR BLD: 20 % (ref 12–49)
MCH RBC QN AUTO: 20.3 PG (ref 26–34)
MCHC RBC AUTO-ENTMCNC: 31.4 G/DL (ref 30–36.5)
MCV RBC AUTO: 64.6 FL (ref 80–99)
MONOCYTES # BLD: 0.7 K/UL (ref 0–1)
MONOCYTES NFR BLD: 9 % (ref 5–13)
NEUTS SEG # BLD: 5.4 K/UL (ref 1.8–8)
NEUTS SEG NFR BLD: 69 % (ref 32–75)
NRBC # BLD: 0 K/UL (ref 0–0.01)
NRBC BLD-RTO: 0 PER 100 WBC
P-R INTERVAL, ECG05: 162 MS
PLATELET # BLD AUTO: 281 K/UL (ref 150–400)
PMV BLD AUTO: ABNORMAL FL (ref 8.9–12.9)
POTASSIUM SERPL-SCNC: 3.7 MMOL/L (ref 3.5–5.1)
Q-T INTERVAL, ECG07: 434 MS
QRS DURATION, ECG06: 78 MS
QTC CALCULATION (BEZET), ECG08: 451 MS
RBC # BLD AUTO: 3.79 M/UL (ref 3.8–5.2)
RBC MORPH BLD: ABNORMAL
SERVICE CMNT-IMP: ABNORMAL
SERVICE CMNT-IMP: NORMAL
SODIUM SERPL-SCNC: 142 MMOL/L (ref 136–145)
VENTRICULAR RATE, ECG03: 65 BPM
WBC # BLD AUTO: 7.9 K/UL (ref 3.6–11)

## 2019-03-24 PROCEDURE — 97116 GAIT TRAINING THERAPY: CPT

## 2019-03-24 PROCEDURE — 83036 HEMOGLOBIN GLYCOSYLATED A1C: CPT

## 2019-03-24 PROCEDURE — 82272 OCCULT BLD FECES 1-3 TESTS: CPT

## 2019-03-24 PROCEDURE — 85025 COMPLETE CBC W/AUTO DIFF WBC: CPT

## 2019-03-24 PROCEDURE — 93306 TTE W/DOPPLER COMPLETE: CPT

## 2019-03-24 PROCEDURE — 74011636637 HC RX REV CODE- 636/637: Performed by: INTERNAL MEDICINE

## 2019-03-24 PROCEDURE — 97161 PT EVAL LOW COMPLEX 20 MIN: CPT

## 2019-03-24 PROCEDURE — 74011636637 HC RX REV CODE- 636/637: Performed by: FAMILY MEDICINE

## 2019-03-24 PROCEDURE — 74011000258 HC RX REV CODE- 258: Performed by: INTERNAL MEDICINE

## 2019-03-24 PROCEDURE — 65660000000 HC RM CCU STEPDOWN

## 2019-03-24 PROCEDURE — 74011250637 HC RX REV CODE- 250/637: Performed by: INTERNAL MEDICINE

## 2019-03-24 PROCEDURE — 80048 BASIC METABOLIC PNL TOTAL CA: CPT

## 2019-03-24 PROCEDURE — 74011250637 HC RX REV CODE- 250/637: Performed by: FAMILY MEDICINE

## 2019-03-24 PROCEDURE — 94760 N-INVAS EAR/PLS OXIMETRY 1: CPT

## 2019-03-24 PROCEDURE — 74011250636 HC RX REV CODE- 250/636: Performed by: INTERNAL MEDICINE

## 2019-03-24 PROCEDURE — 74011250636 HC RX REV CODE- 250/636: Performed by: FAMILY MEDICINE

## 2019-03-24 PROCEDURE — 36415 COLL VENOUS BLD VENIPUNCTURE: CPT

## 2019-03-24 RX ORDER — FUROSEMIDE 10 MG/ML
20 INJECTION INTRAMUSCULAR; INTRAVENOUS DAILY
Status: DISCONTINUED | OUTPATIENT
Start: 2019-03-25 | End: 2019-03-25

## 2019-03-24 RX ORDER — ACETAMINOPHEN 500 MG
500 TABLET ORAL
Status: DISCONTINUED | OUTPATIENT
Start: 2019-03-24 | End: 2019-03-28 | Stop reason: HOSPADM

## 2019-03-24 RX ORDER — INSULIN LISPRO 100 [IU]/ML
INJECTION, SOLUTION INTRAVENOUS; SUBCUTANEOUS
Status: DISCONTINUED | OUTPATIENT
Start: 2019-03-24 | End: 2019-03-28 | Stop reason: HOSPADM

## 2019-03-24 RX ORDER — PREGABALIN 150 MG/1
CAPSULE ORAL 2 TIMES DAILY
COMMUNITY

## 2019-03-24 RX ORDER — AMLODIPINE BESYLATE 5 MG/1
5 TABLET ORAL DAILY
Status: DISCONTINUED | OUTPATIENT
Start: 2019-03-24 | End: 2019-03-26

## 2019-03-24 RX ORDER — POTASSIUM CHLORIDE 750 MG/1
10 TABLET, FILM COATED, EXTENDED RELEASE ORAL DAILY
Status: DISCONTINUED | OUTPATIENT
Start: 2019-03-24 | End: 2019-03-28 | Stop reason: HOSPADM

## 2019-03-24 RX ORDER — PREGABALIN 75 MG/1
150 CAPSULE ORAL 2 TIMES DAILY
Status: DISCONTINUED | OUTPATIENT
Start: 2019-03-24 | End: 2019-03-28 | Stop reason: HOSPADM

## 2019-03-24 RX ORDER — AMLODIPINE BESYLATE 5 MG/1
2.5 TABLET ORAL DAILY
COMMUNITY
End: 2019-03-28

## 2019-03-24 RX ADMIN — ATORVASTATIN CALCIUM 20 MG: 20 TABLET, FILM COATED ORAL at 08:57

## 2019-03-24 RX ADMIN — Medication 10 ML: at 21:50

## 2019-03-24 RX ADMIN — CARVEDILOL 25 MG: 12.5 TABLET, FILM COATED ORAL at 08:57

## 2019-03-24 RX ADMIN — INSULIN GLARGINE 25 UNITS: 100 INJECTION, SOLUTION SUBCUTANEOUS at 21:49

## 2019-03-24 RX ADMIN — LEVOTHYROXINE SODIUM 175 MCG: 150 TABLET ORAL at 06:39

## 2019-03-24 RX ADMIN — INSULIN GLARGINE 25 UNITS: 100 INJECTION, SOLUTION SUBCUTANEOUS at 08:57

## 2019-03-24 RX ADMIN — Medication 10 ML: at 06:41

## 2019-03-24 RX ADMIN — INSULIN LISPRO 2 UNITS: 100 INJECTION, SOLUTION INTRAVENOUS; SUBCUTANEOUS at 21:50

## 2019-03-24 RX ADMIN — HEPARIN SODIUM 7500 UNITS: 5000 INJECTION INTRAVENOUS; SUBCUTANEOUS at 00:02

## 2019-03-24 RX ADMIN — AMLODIPINE BESYLATE 5 MG: 5 TABLET ORAL at 12:31

## 2019-03-24 RX ADMIN — ACETAMINOPHEN 500 MG: 500 TABLET ORAL at 21:54

## 2019-03-24 RX ADMIN — CEFTRIAXONE 1 G: 1 INJECTION, POWDER, FOR SOLUTION INTRAMUSCULAR; INTRAVENOUS at 11:18

## 2019-03-24 RX ADMIN — ASPIRIN 81 MG: 81 TABLET, COATED ORAL at 08:57

## 2019-03-24 RX ADMIN — CARVEDILOL 25 MG: 12.5 TABLET, FILM COATED ORAL at 18:09

## 2019-03-24 RX ADMIN — LISINOPRIL 40 MG: 20 TABLET ORAL at 08:57

## 2019-03-24 RX ADMIN — Medication 10 ML: at 15:05

## 2019-03-24 RX ADMIN — INSULIN LISPRO 5 UNITS: 100 INJECTION, SOLUTION INTRAVENOUS; SUBCUTANEOUS at 12:30

## 2019-03-24 RX ADMIN — HEPARIN SODIUM 7500 UNITS: 5000 INJECTION INTRAVENOUS; SUBCUTANEOUS at 18:09

## 2019-03-24 RX ADMIN — HEPARIN SODIUM 7500 UNITS: 5000 INJECTION INTRAVENOUS; SUBCUTANEOUS at 08:57

## 2019-03-24 RX ADMIN — PREGABALIN 150 MG: 75 CAPSULE ORAL at 15:05

## 2019-03-24 RX ADMIN — POTASSIUM CHLORIDE 10 MEQ: 750 TABLET, EXTENDED RELEASE ORAL at 12:31

## 2019-03-24 RX ADMIN — FUROSEMIDE 20 MG: 10 INJECTION, SOLUTION INTRAMUSCULAR; INTRAVENOUS at 08:56

## 2019-03-24 NOTE — PROGRESS NOTES
Progress Note Pt Name  Pao Trevino Date of Birth 1935 Medical Record Number  [id number] Age  80 y.o. PCP Julisa Leavitt NP Admit date:  3/23/2019 Room Number  2221/01  @ [facility] Date of Service  3/24/2019 Admission Diagnoses:  Acute hypoxemic respiratory failure ([facility] Assessment and plan:  
 
Acute hypoxemic respiratory failure ([facility] (3/23/2019) Hypertensive urgency Acute CHF (congestive heart failure) exacerbation (HCC) (3/23/2019) Slight BNP elevation, Possible CAP  
· ECHO ordered · Cardiology consult · Continue diuretics · HTN management · I started her on Rocephin today due to CT finding suggestive of PNA. This will need follow up for resolution and might need Bx due to ominous signs CTA chest  
Nodule 1.7 x 1.3 cm nodule right lower lobe along the oblique fissure with 
bilateral airspace disease most confluent at the right lung base and septal 
lobular thickening and right pleural effusion. Mediastinal adenopathy. Airspace 
disease suggest infection however the soft tissue nodule and mediastinal 
adenopathy is concerning for underlying neoplasm. Recommend short interim 
follow-up posttreatment to ensure resolution DM (diabetes mellitus) ([facility] (3/23/2019) · Hyperglycemic, continue Lantus and  
· Added SSI · Diabetic diet Hyperlipidemia · Continue statin Obesity Body mass index is 42.76 kg/m². Anemia: Hb stable but low · FOBT ordered CODE STATUS   Full Functional Status  Walks with shanice and walker Surrogate decision maker:      
Prophylaxis   Hep SQ Discharge Plan:  SNF/LTC,  ? Misc Benefit:  Payor: [employer] Signs / Plan: [employer] [address] / Product Type: Managed Care Medicare /   
Isolation :  There are currently no Active Isolations ADT status:  INPATIENT Query   None noted today Prognosis   Fair Social issues  Date  Comment 03/24/19 4:15 PM  Met with her nieces in the room. It seems they are primary caregivers to the patient. Subjective Data \"OK \" Review of Systems - History obtained from nieces  and the patient Respiratory ROS: negative for - cough or hemoptysis Cardiovascular ROS: no chest pain or dyspnea on exertion Objective Data Comments  Pleasant elderly lady sitting on bedside chair in no distress Her family members are in the room Patient Vitals for the past 24 hrs: 
 BP  
03/24/19 1124 146/74  
03/24/19 0717 157/54  
03/24/19 0352 156/55  
03/23/19 2230 152/60  
03/23/19 1921 158/56  
03/23/19 1820 173/57  
03/23/19 1623 182/74  
03/23/19 1600 167/60  
03/23/19 1545 165/58  
03/23/19 1515 160/68  
03/23/19 1430 161/61  
03/23/19 1400 178/71 Patient Vitals for the past 24 hrs: 
 Pulse 03/24/19 1124 65  
03/24/19 0717 64  
03/24/19 0352 65  
03/23/19 2230 68  
03/23/19 1921 67  
03/23/19 1820 70  
03/23/19 1623 70  
03/23/19 1600 65  
03/23/19 1545 64  
03/23/19 1515 65  
03/23/19 1430 62  
03/23/19 1400 62 Patient Vitals for the past 24 hrs: 
 Resp  
03/24/19 1124 20  
03/24/19 0717 20  
03/24/19 0352 20  
03/23/19 2230 20  
03/23/19 1921 20  
03/23/19 1623 17  
03/23/19 1600 17  
03/23/19 1545 17  
03/23/19 1515 19  
03/23/19 1430 26  
03/23/19 1400 19 Patient Vitals for the past 24 hrs: 
 Temp  
03/24/19 1124 98.1 °F (36.7 °C)  
03/24/19 0717 98.6 °F (37 °C)  
03/24/19 0352 98.1 °F (36.7 °C)  
03/23/19 2230 98.5 °F (36.9 °C)  
03/23/19 1921 98.3 °F (36.8 °C)  
03/23/19 1623 98.4 °F (36.9 °C)  
03/23/19 1545 97.9 °F (36.6 °C) SpO2 Readings from Last 6 Encounters:  
03/24/19 98% 02/10/19 100% 08/12/16 98% O2 Device: Room air Body mass index is 42.76 kg/m². - 
Wt Readings from Last 10 Encounters:  
03/24/19 113 kg (249 lb 1.9 oz) 02/10/19 112.9 kg (249 lb) 08/09/16 118.1 kg (260 lb 5.8 oz) Physical Exam:            
General:  Alert, cooperative,  
 well noursished,  
well developed,  
appears stated age Ears/Eyes:  Hearing intact Sclera anicteric. Pupils equal  
Mouth/Throat:  Mucous membranes normal pink and moist 
  
Neck:    
Lungs:  Trachea midline Chest excursion symmetrical  
Auscultation B/L Symmetrical with Vesicular breath sounds No Crepitations noted Percussion note resonant on mid Clavicular line; no sign of pneumothorax CVS:  Regular rate and rhythm  
no  murmur, No click, rub or gallop S1 normal  
S2 normal  
Pedal pulses  b/l symmetrical  
Abdomen:  Obese Soft, non-tender Bowel sounds normal 
No distension Extremities: No cyanosis, jaundice No** edema noted No sign of DVT/cord like lesion on palpation No sign of acute trauma Age appropriate OA changes noted. Skin:   
Skin color, texture, turgor normal. no acute rash or lesions Lymph nodes: Musculoskeletal Muscle bulk B/L symmetrical  
Neuro Cranial nerves are intact,  
motor movement b/l symmetrical, Sensory evaluation b/l symmetrical   
Psych:  Alert and oriented,  
normal mood & affect Medications reviewed Current Facility-Administered Medications Medication Dose Route Frequency  acetaminophen (TYLENOL) tablet 500 mg  500 mg Oral Q4H PRN  
 cefTRIAXone (ROCEPHIN) 1 g in 0.9% sodium chloride (MBP/ADV) 50 mL  1 g IntraVENous Q24H  
 amLODIPine (NORVASC) tablet 5 mg  5 mg Oral DAILY  [START ON 3/25/2019] furosemide (LASIX) injection 20 mg  20 mg IntraVENous DAILY  potassium chloride SR (KLOR-CON 10) tablet 10 mEq  10 mEq Oral DAILY  sodium chloride (NS) flush 5-40 mL  5-40 mL IntraVENous Q8H  
 sodium chloride (NS) flush 5-40 mL  5-40 mL IntraVENous PRN  
 atorvastatin (LIPITOR) tablet 20 mg  20 mg Oral DAILY  levothyroxine (SYNTHROID) tablet 175 mcg  175 mcg Oral 6am  
 aspirin delayed-release tablet 81 mg  81 mg Oral DAILY  carvedilol (COREG) tablet 25 mg  25 mg Oral BID WITH MEALS  
  lisinopril (PRINIVIL, ZESTRIL) tablet 40 mg  40 mg Oral DAILY  docusate sodium (COLACE) capsule 100 mg  100 mg Oral PRN  
 glucose chewable tablet 16 g  4 Tab Oral PRN  
 dextrose (D50W) injection syrg 12.5-25 g  25-50 mL IntraVENous PRN  
 glucagon (GLUCAGEN) injection 1 mg  1 mg IntraMUSCular PRN  
 insulin lispro (HUMALOG) injection   SubCUTAneous Q6H  
 sodium chloride (NS) flush 5-40 mL  5-40 mL IntraVENous Q8H  
 sodium chloride (NS) flush 5-40 mL  5-40 mL IntraVENous PRN  
 hydrALAZINE (APRESOLINE) 20 mg/mL injection 10 mg  10 mg IntraVENous Q6H PRN  
 heparin (porcine) injection 7,500 Units  7,500 Units SubCUTAneous Q8H  
 insulin glargine (LANTUS) injection 25 Units  25 Units SubCUTAneous BID Relevant other informations: Other medical conditions listed in [facility] problem list section; all of these and other pertinent data were taken into consideration when treatment plan is developed and customized to this patient's unique overall circumstances and needs. We have reviewed available old medical records within the constraints of this admission process. Data Review:  
Recent Days:  
All Micro Results Procedure Component Value Units Date/Time CULTURE, RESPIRATORY/SPUTUM/BRONCH Rarden Citron STAIN [[id number]] Order Status:  Sent Specimen:  Sputum Beckie Jacobo [[id number]] Collected:  03/23/19 5561 Order Status:  Completed Updated:  03/23/19 1401 INFLUENZA A & B AG (RAPID TEST) [[id number]] Collected:  03/23/19 0755 Order Status:  Completed Specimen:  Nasopharyngeal from Nasal washing Updated:  03/23/19 0820 Influenza A Antigen NEGATIVE Influenza B Antigen NEGATIVE Recent Labs  
  03/24/19 0415 03/23/19 
6418 WBC 7.9 11.8* HGB 7.7* 7.8* HCT 24.5* 25.1*  
 261 Recent Labs  
  03/24/19 
0415 03/23/19 
1151  141  
K 3.7 4.3  107 CO2 32 26 * 151* BUN 26* 27* CREA 1.21* 0.97  
 CA 8.8 8.6 ALB  --  3.2* TBILI  --  0.4 SGOT  --  18 ALT  --  24 Lab Results Component Value Date/Time TSH 0.88 08/09/2016 07:35 AM  
 
 
 
  
Care Plan discussed with:Patient/Family and Nurse Other medical conditions are listed in the active hospital problem list section; these and other pertinent data were taken into consideration when the treatment plan was developed and customized to this patient's unique overall circumstances and needs. High complexity decision making was performed for this patient who is at high risk for decompensation with multiple organ involvement. Today total floor/unit time was 35 minutes while caring for this patient and greater than 50% of that time was spent with patient (and/or family) coordinating patients clinical issues; this includes time spent during multidisciplinary rounds. Abbi Marroquin MD MPH FACP   
3/24/2019

## 2019-03-24 NOTE — PROGRESS NOTES
Problem: Mobility Impaired (Adult and Pediatric) Goal: *Acute Goals and Plan of Care (Insert Text) Description Physical Therapy Goals Initiated 3/24/2019 1. Patient will move from supine to sit and sit to supine  in bed with independence within 7 day(s). 2.  Patient will transfer from bed to chair and chair to bed with independence using the least restrictive device within 7 day(s). 3.  Patient will perform sit to stand with independence within 7 day(s). 4.  Patient will ambulate with independence for 150 feet with the least restrictive device within 7 day(s) with o2 sats in mid 90s. 5.  Patient will ascend/descend 3 stairs with 1 handrail(s) with min A/CGA within 7 day(s). Note: PHYSICAL THERAPY EVALUATION Patient: Mary Beth Barrera (80 y.o. female) Date: 3/24/2019 Primary Diagnosis: Acute hypoxemic respiratory failure ([facility] [J96.01] CHF (congestive heart failure) ([facility] [I50.9] DM (diabetes mellitus) ([facility] [E11.9] Precautions: fall, CHF    
 
ASSESSMENT : 
Based on the objective data described below, the patient presents with mild mobility impairments due to her limited endurance and present admission with increasing dyspnea. Pt reports that she is doing much better now and feeling good. Pt lives with son in [address] [place] [address] in a one story house. She uses a cane most of the time, she owns a walker, and uses no home o2. She has had HHPT once before. Pts balance, transfers in & out of the chair and gait are all good, needing only SBA. Her endurance is limited, however this short distance and level of activity is close to her baseline. Reviewed and demonstrated with pt chair push ups which will help her in getting OOC. Pt agreed to do these at home to help herself. HR and O2 sats stable before and after her walking in [place] with cane. Recommend home with no services but encouraged more ambulation and weight loss.   Will see pt 3x/week while inpt to improve endurance and independence. Patient will benefit from skilled intervention to address the above impairments. Patient?s rehabilitation potential is considered to be Excellent Factors which may influence rehabilitation potential include:  
? None noted ? Mental ability/status ? Medical condition ? Home/family situation and support systems ? Safety awareness 
? Pain tolerance/management 
? Other: PLAN : 
Recommendations and Planned Interventions: 
?           Bed Mobility Training             ? Neuromuscular Re-Education ? Transfer Training                   ? Orthotic/Prosthetic Training 
? Gait Training                         ? Modalities ? Therapeutic Exercises           ? Edema Management/Control ? Therapeutic Activities            ? Patient and Family Training/Education ? Other (comment): Frequency/Duration: Patient will be followed by physical therapy  3 times a week to address goals. Discharge Recommendations: None Further Equipment Recommendations for Discharge: SUBJECTIVE:  
Patient stated ? I can't walk one city block. I just walk in the house best I can. .? OBJECTIVE DATA SUMMARY:  
HISTORY:   
Past Medical History:  
Diagnosis Date Diabetes ([facility] [address] [facility] Heart attack [facility]) 2009 Hyperlipidemia Hypertension Past Surgical History:  
Procedure Laterality Date HX THYROIDECTOMY  2005 Prior Level of Function/Home Situation: independent with straight cane Personal factors and/or comorbidities impacting plan of care: obesity Home Situation Home Environment: Private residence One/Two Story Residence: One story Living Alone: Yes Support Systems: Child(adrian) Patient Expects to be Discharged to[de-identified] Private residence Current DME Used/Available at Home: dalia Morales Milly Perkins EXAMINATION/PRESENTATION/DECISION MAKING:  
Critical Behavior: Neurologic State: Alert Hearing: Auditory Auditory Impairment: Hard of hearing, left side Skin:  intact Edema: +1 ankles Range Of Motion: 
AROM: Generally decreased, functional 
  
  
  
  
  
  
  
Strength:   
Strength: Generally decreased, functional 
  
  
  
  
  
  
Tone & Sensation:  
Tone: Normal 
  
  
  
  
Sensation: Intact Coordination: 
Coordination: Generally decreased, functional 
Vision:  
  
Functional Mobility: 
Bed Mobility: 
  
 Not observed Transfers: 
Sit to Stand: Stand-by assistance Stand to Sit: Supervision Balance:  
Sitting: Intact Standing: Intact Ambulation/Gait Training: 
Distance (ft): 60 Feet (ft) Assistive Device: Cane, straight Ambulation - Level of Assistance: Contact guard assistance Right Side Weight Bearing: Full Left Side Weight Bearing: Full Base of Support: Widened Stance: Left increased Speed/Juliana: Pace decreased (<100 feet/min); Shuffled Step Length: Left shortened;Right shortened Stairs: Therapeutic Exercises:  
Chair push ups x 3 with S (able to do all three ) Knee extensions with hold x 10 Ankle pumps x 20 Marching x 20 Functional Measure: 
Elder Mobility Scale 
16/20 Scores under 10  generally these patients are dependent in mobility maneuvers; require help with 
basic ADL, such as transfers, toileting and dressing. Scores between 10  13  generally these patients are borderline in terms of safe mobility and 
independence in ADL i.e. they require some help with some mobility maneuvers. Scores over 14  Generally these patients are able to perform mobility maneuvers alone and safely 
and are independent in basic ADL. Physical Therapy Evaluation Charge Determination History Examination Presentation Decision-Making LOW Complexity : Zero comorbidities / personal factors that will impact the outcome / POC LOW Complexity : 1-2 Standardized tests and measures addressing body structure, function, activity limitation and / or participation in recreation  LOW Complexity : Stable, uncomplicated  LOW Complexity : FOTO score of  Based on the above components, the patient evaluation is determined to be of the following complexity level: LOW Pain: 
Pain Scale 1: Numeric (0 - 10) Pain Intensity 1: 0 Activity Tolerance:  
Good, VSS, needs to improve endurance Please refer to the flowsheet for vital signs taken during this treatment. After treatment:  
?         Patient left in no apparent distress sitting up in chair ? Patient left in no apparent distress in bed 
? Call bell left within reach ? Nursing notified ? Caregiver present ? Bed alarm activated COMMUNICATION/EDUCATION:  
The patient?s plan of care was discussed with: Registered Nurse. ?         Fall prevention education was provided and the patient/caregiver indicated understanding. ? Patient/family have participated as able in goal setting and plan of care. ?         Patient/family agree to work toward stated goals and plan of care. ?         Patient understands intent and goals of therapy, but is neutral about his/her participation. ? Patient is unable to participate in goal setting and plan of care. Thank you for this referral. 
Roxann Wong, PT Time Calculation: 27 mins

## 2019-03-24 NOTE — PROGRESS NOTES
Progress Note 3/24/2019 10:52 AM 
NAME: Christiano Arriaga MRN:  [id number] Admit Diagnosis: Acute hypoxemic respiratory failure ([facility] [address] [J96.01] [facility]est[facility] [I50.9] DM (diabetes mellitus) ([facility] [E11.9] Assessment:   
  
1. Dyspnea, edema, orthopnea presents with pulm edema consistent with CHF 2. Distant hx of CAD with ?cath in the 2000's with no intervention 3. Unknown EF 4. Sinus 5. HTN 6. DM 
7. Dyslipidemia 8. Anemia unclear no prior endoscopy 9. CT scan with pulm nodule RLL 10. Obese 11. , lives with family, retired , uses a cane 
   
  
            Plan:    
  
Presents with edema and orthopnea suggestive of CHF. pBNP was low, CXR showed mild pulm edema, CT scan did not show clear pulm edema. No PE, but pulmonary nodule. Is feeling better with diuresis. Was transiently on bipap. Likely has component of chf. No ST changes on ECG, negative enzymes. Sinus 
  
Check echo for EF. 
  
1. Cont asa 2. Cont coreg 3. Cont lisinopril 4. Resume amlodipine 5. Change hCTZ to lasix 20mg iv daily, add kcl, follow bmp 6. Cont statin 
  
Anemia unclear 
   
  
 [x]        High complexity decision making was performed 
  
 
 
 
 
Subjective:  
 
Angélica Sims denies chest pain, dyspnea. Discussed with RN events overnight. Review of Systems: 
 
Symptom Y/N Comments  Symptom Y/N Comments Fever/Chills N   Chest Pain N Poor Appetite N   Edema N   
Cough N   Abdominal Pain N Sputum N   Joint Pain N   
SOB/NEWMAN N   Pruritis/Rash N   
Nausea/vomit N   Tolerating PT/OT Y Diarrhea N   Tolerating Diet Y Constipation N   Other Could NOT obtain due to:   
 
Objective:  
  
Physical Exam: 
 
Last 24hrs VS reviewed since prior progress note. Most recent are: 
 
Visit Vitals /54 (BP 1 Location: Right arm, BP Patient Position: Sitting; Other (comment)) Comment (BP Patient Position): legs elevated Pulse 64 Temp 98.6 °F (37 °C) Resp 20 Ht 5' 4\" (1.626 m) Wt 113 kg (249 lb 1.9 oz) SpO2 98% BMI 42.76 kg/m² Intake/Output Summary (Last 24 hours) at 3/24/2019 1052 Last data filed at 3/24/2019 9208 Gross per 24 hour Intake  Output 3650 ml Net -3650 ml General Appearance: Well developed, well nourished, alert & oriented x 3,  
 no acute distress. Ears/Nose/Mouth/Throat: Hearing grossly normal. 
Neck: Supple. Chest: Lungs clear to auscultation bilaterally. Cardiovascular: Regular rate and rhythm, S1S2 normal, no murmur. Abdomen: Soft, non-tender, bowel sounds are active. Extremities: No edema bilaterally. Skin: Warm and dry. PMH/SH reviewed - no change compared to H&P Data Review Telemetry: normal sinus rhythm Lab Data Personally Reviewed: 
 
Recent Labs  
  03/24/19 
0415 03/23/19 
1432 WBC 7.9 11.8* HGB 7.7* 7.8* HCT 24.5* 25.1*  
 261 No results for input(s): INR, PTP, APTT in the last 72 hours. No lab exists for component: INREXT Recent Labs  
  03/24/19 
0415 03/23/19 
7344  141  
K 3.7 4.3  107 CO2 32 26 BUN 26* 27* CREA 1.21* 0.97 * 151* CA 8.8 8.6 Recent Labs  
  03/23/19 
9673 TROIQ <0.05 No results found for: CHOL, CHOLX, CHLST, CHOLV, HDL, LDL, LDLC, DLDLP, TGLX, TRIGL, TRIGP, CHHD, CHHDX Recent Labs  
  03/23/19 
9807 SGOT 18 AP 94  
TP 8.3* ALB 3.2*  
GLOB 5.1* No results for input(s): PH, PCO2, PO2 in the last 72 hours. Medications Personally Reviewed: 
 
Current Facility-Administered Medications Medication Dose Route Frequency  acetaminophen (TYLENOL) tablet 500 mg  500 mg Oral Q4H PRN  
 cefTRIAXone (ROCEPHIN) 1 g in 0.9% sodium chloride (MBP/ADV) 50 mL  1 g IntraVENous Q24H  
 amLODIPine (NORVASC) tablet 5 mg  5 mg Oral DAILY  [START ON 3/25/2019] furosemide (LASIX) injection 20 mg  20 mg IntraVENous DAILY  potassium chloride SR (KLOR-CON 10) tablet 10 mEq  10 mEq Oral DAILY  sodium chloride (NS) flush 5-40 mL  5-40 mL IntraVENous Q8H  
 sodium chloride (NS) flush 5-40 mL  5-40 mL IntraVENous PRN  
 atorvastatin (LIPITOR) tablet 20 mg  20 mg Oral DAILY  levothyroxine (SYNTHROID) tablet 175 mcg  175 mcg Oral 6am  
 aspirin delayed-release tablet 81 mg  81 mg Oral DAILY  carvedilol (COREG) tablet 25 mg  25 mg Oral BID WITH MEALS  lisinopril (PRINIVIL, ZESTRIL) tablet 40 mg  40 mg Oral DAILY  docusate sodium (COLACE) capsule 100 mg  100 mg Oral PRN  
 glucose chewable tablet 16 g  4 Tab Oral PRN  
 dextrose (D50W) injection syrg 12.5-25 g  25-50 mL IntraVENous PRN  
 glucagon (GLUCAGEN) injection 1 mg  1 mg IntraMUSCular PRN  
 insulin lispro (HUMALOG) injection   SubCUTAneous Q6H  
 sodium chloride (NS) flush 5-40 mL  5-40 mL IntraVENous Q8H  
 sodium chloride (NS) flush 5-40 mL  5-40 mL IntraVENous PRN  
 hydrALAZINE (APRESOLINE) 20 mg/mL injection 10 mg  10 mg IntraVENous Q6H PRN  
 heparin (porcine) injection 7,500 Units  7,500 Units SubCUTAneous Q8H  
 insulin glargine (LANTUS) injection 25 Units  25 Units SubCUTAneous BID Len Givens MD

## 2019-03-24 NOTE — PROGRESS NOTES
7: 27 AM Received bedside verbal report from ERIN Gupta. Bedside and Verbal shift change report given to Alie Max RN (oncoming nurse). Report included the following information SBAR, Kardex, OR Summary, Intake/Output and Cardiac Rhythm NSR.  
 
SHIFT SUMMARY: 
 
 
CONCERNS TO ADDRESS WITH MD: 
 
 
 
[address] NURSING NOTE Admission Date 3/23/2019 Admission Diagnosis Acute hypoxemic respiratory failure ([facility] [address] [J96.01] CHF (congestive heart failure) ([facility] [address] [I50.9] DM (diabetes mellitus) ([facility] [address] [E11.9] Consults IP CONSULT TO CARDIOLOGY 
IP CONSULT TO PALLIATIVE CARE - PROVIDER Cardiac Monitoring [x] Yes [] No  
  
Purposeful Hourly Rounding [x] Yes   
Estelle Score Total Score: 3 Estelle score 3 or > [x] Bed Alarm [] Avasys [] 1:1 sitter [] Patient refused (Signed refusal form in chart) Ayaan Score Ayaan Score: 17 Ayaan score 14 or < [] PMT consult [] Wound Care consult  
 []  Specialty bed  [] Nutrition consult Influenza Vaccine Received Flu Vaccine for Current Season (usually Sept-March): No  
 Patient/Guardian Refused (Notify MD): No  
  
Oxygen needs? [x] Room air Oxygen @  []1L    []2L    []3L   []4L    []5L   []6L via NC Chronic home O2 use? [] Yes [x] No 
Perform O2 challenge test and document in progress note using smartphrase (.Homeoxygen) Last bowel movement Last Bowel Movement Date: 03/24/19 Urinary Catheter LDAs Peripheral IV 03/23/19 Left Antecubital (Active) Site Assessment Clean, dry, & intact 3/24/2019  3:44 PM  
Phlebitis Assessment 0 3/24/2019  3:44 PM  
Infiltration Assessment 0 3/24/2019  3:44 PM  
Dressing Status Clean, dry, & intact 3/24/2019  3:44 PM  
Dressing Type Transparent;Tape 3/24/2019  3:44 PM  
Hub Color/Line Status Green;Flushed 3/24/2019  3:44 PM  
                  
  
Readmission Risk Assessment Tool Score High Risk   
      
 24 Total Score 3 Has Seen PCP in Last 6 Months (Yes=3, No=0) 5 Pt. Coverage (Medicare=5 , Medicaid, or Self-Pay=4) 16 Charlson Comorbidity Score (Age + Comorbid Conditions) Criteria that do not apply:  
 . Living with Significant Other. Assisted Living. LTAC. SNF. or  
Rehab Patient Length of Stay (>5 days = 3) IP Visits Last 12 Months (1-3=4, 4=9, >4=11) Expected Length of Stay - - - Actual Length of Stay 1

## 2019-03-24 NOTE — PROGRESS NOTES
Spiritual Care Assessment/Progress Note [address] 
 
 
NAME: Carmen Rivera      MRN: [id number] AGE: 80 y.o. SEX: female Buddhist Affiliation: Restorationism  
Language: English  
 
3/24/2019     Total Time (in minutes): 17 Spiritual Assessment begun in [facility] CARDIOPULMONARY CARE through conversation with: 
  
    [x]Patient        [x] Family    [] Friend(s) Reason for Consult: Palliative Care, Initial/Spiritual Assessment Spiritual beliefs: (Please include comment if needed) [x] Identifies with a antonietta tradition:   Restorationism  
   [x] Supported by a antonietta community:        
   [] Claims no spiritual orientation:       
   [] Seeking spiritual identity:            
   [] Adheres to an individual form of spirituality:       
   [] Not able to assess:                   
 
    
Identified resources for coping:  
   [x] Prayer                           
   [] Music                  [] Guided Imagery [x] Family/friends                 [] Pet visits [] Devotional reading                         [] Unknown 
   [] Other:                                          
 
 
Interventions offered during this visit: (See comments for more details) Patient Interventions: Affirmation of antonietta, Affirmation of emotions/emotional suffering, Catharsis/review of pertinent events in supportive environment, Iconic (affirming the presence of God/Higher Power), Initial/Spiritual assessment, patient floor, Prayer (assurance of), Buddhist beliefs/image of God discussed Plan of Care: 
 
 [x] Support spiritual and/or cultural needs  
 [] Support AMD and/or advance care planning process    
 [] Support grieving process 
 [] Coordinate Rites and/or Rituals  
 [] Coordination with community clergy 
 [x] No spiritual needs identified at this time 
 [] Detailed Plan of Care below (See Comments)  [] Make referral to Music Therapy 
[] Make referral to Pet Therapy [] Make referral to Addiction services 
[] Make referral to [facility] 
[] Make referral to Spiritual Care Partner 
[] No future visits requested       
[x] Follow up visits as needed Comments:   Initial visit on [address] [facility] unit for spiritual assessment of new palliative consult patient. Two of patient's nieces were present. Patient was seated in recliner watching the [employer] on television. She was very pleasant and appears to be coping very well. She shared that she has a very supportive family; she lives with her son and her niece helps out regularly. She expressed no spiritual needs or concerns at this time. Provided pastoral presence and assurance of prayer. Advised her of  availability. ANTOINE Hodges, [address],  [facility]  Paging Service  [phone])

## 2019-03-25 ENCOUNTER — HOME HEALTH ADMISSION (OUTPATIENT)
Dept: HOME HEALTH SERVICES | Facility: HOME HEALTH | Age: 84
End: 2019-03-25
Payer: MEDICARE

## 2019-03-25 LAB
ANION GAP SERPL CALC-SCNC: 6 MMOL/L (ref 5–15)
BASOPHILS # BLD: 0.1 K/UL (ref 0–0.1)
BASOPHILS NFR BLD: 1 % (ref 0–1)
BUN SERPL-MCNC: 34 MG/DL (ref 6–20)
BUN/CREAT SERPL: 24 (ref 12–20)
CALCIUM SERPL-MCNC: 8.4 MG/DL (ref 8.5–10.1)
CHLORIDE SERPL-SCNC: 104 MMOL/L (ref 97–108)
CO2 SERPL-SCNC: 32 MMOL/L (ref 21–32)
CREAT SERPL-MCNC: 1.4 MG/DL (ref 0.55–1.02)
DIFFERENTIAL METHOD BLD: ABNORMAL
EOSINOPHIL # BLD: 0.2 K/UL (ref 0–0.4)
EOSINOPHIL NFR BLD: 2 % (ref 0–7)
ERYTHROCYTE [DISTWIDTH] IN BLOOD BY AUTOMATED COUNT: 24.4 % (ref 11.5–14.5)
GLUCOSE BLD STRIP.AUTO-MCNC: 106 MG/DL (ref 65–100)
GLUCOSE BLD STRIP.AUTO-MCNC: 113 MG/DL (ref 65–100)
GLUCOSE BLD STRIP.AUTO-MCNC: 323 MG/DL (ref 65–100)
GLUCOSE BLD STRIP.AUTO-MCNC: 88 MG/DL (ref 65–100)
GLUCOSE SERPL-MCNC: 98 MG/DL (ref 65–100)
HCT VFR BLD AUTO: 22.9 % (ref 35–47)
HGB BLD-MCNC: 7.2 G/DL (ref 11.5–16)
IMM GRANULOCYTES # BLD AUTO: 0 K/UL (ref 0–0.04)
IMM GRANULOCYTES NFR BLD AUTO: 0 % (ref 0–0.5)
LYMPHOCYTES # BLD: 1.9 K/UL (ref 0.8–3.5)
LYMPHOCYTES NFR BLD: 23 % (ref 12–49)
MCH RBC QN AUTO: 20.5 PG (ref 26–34)
MCHC RBC AUTO-ENTMCNC: 31.4 G/DL (ref 30–36.5)
MCV RBC AUTO: 65.2 FL (ref 80–99)
MONOCYTES # BLD: 0.8 K/UL (ref 0–1)
MONOCYTES NFR BLD: 10 % (ref 5–13)
NEUTS SEG # BLD: 5.2 K/UL (ref 1.8–8)
NEUTS SEG NFR BLD: 64 % (ref 32–75)
NRBC # BLD: 0 K/UL (ref 0–0.01)
NRBC BLD-RTO: 0 PER 100 WBC
PLATELET # BLD AUTO: 272 K/UL (ref 150–400)
POTASSIUM SERPL-SCNC: 3.8 MMOL/L (ref 3.5–5.1)
RBC # BLD AUTO: 3.51 M/UL (ref 3.8–5.2)
RBC MORPH BLD: ABNORMAL
SERVICE CMNT-IMP: ABNORMAL
SERVICE CMNT-IMP: NORMAL
SODIUM SERPL-SCNC: 142 MMOL/L (ref 136–145)
WBC # BLD AUTO: 8.2 K/UL (ref 3.6–11)

## 2019-03-25 PROCEDURE — 74011250637 HC RX REV CODE- 250/637: Performed by: INTERNAL MEDICINE

## 2019-03-25 PROCEDURE — 36415 COLL VENOUS BLD VENIPUNCTURE: CPT

## 2019-03-25 PROCEDURE — 74011250636 HC RX REV CODE- 250/636: Performed by: FAMILY MEDICINE

## 2019-03-25 PROCEDURE — 94760 N-INVAS EAR/PLS OXIMETRY 1: CPT

## 2019-03-25 PROCEDURE — 97530 THERAPEUTIC ACTIVITIES: CPT

## 2019-03-25 PROCEDURE — 80048 BASIC METABOLIC PNL TOTAL CA: CPT

## 2019-03-25 PROCEDURE — 82962 GLUCOSE BLOOD TEST: CPT

## 2019-03-25 PROCEDURE — 74011636637 HC RX REV CODE- 636/637: Performed by: INTERNAL MEDICINE

## 2019-03-25 PROCEDURE — 97110 THERAPEUTIC EXERCISES: CPT

## 2019-03-25 PROCEDURE — 74011250637 HC RX REV CODE- 250/637: Performed by: FAMILY MEDICINE

## 2019-03-25 PROCEDURE — 76450000000

## 2019-03-25 PROCEDURE — 97165 OT EVAL LOW COMPLEX 30 MIN: CPT

## 2019-03-25 PROCEDURE — 85025 COMPLETE CBC W/AUTO DIFF WBC: CPT

## 2019-03-25 PROCEDURE — 74011000258 HC RX REV CODE- 258: Performed by: INTERNAL MEDICINE

## 2019-03-25 PROCEDURE — 77030038269 HC DRN EXT URIN PURWCK BARD -A

## 2019-03-25 PROCEDURE — 97116 GAIT TRAINING THERAPY: CPT

## 2019-03-25 PROCEDURE — 74011250636 HC RX REV CODE- 250/636: Performed by: INTERNAL MEDICINE

## 2019-03-25 PROCEDURE — 65660000000 HC RM CCU STEPDOWN

## 2019-03-25 RX ORDER — AZITHROMYCIN 250 MG/1
500 TABLET, FILM COATED ORAL
Status: COMPLETED | OUTPATIENT
Start: 2019-03-25 | End: 2019-03-25

## 2019-03-25 RX ORDER — AZITHROMYCIN 250 MG/1
250 TABLET, FILM COATED ORAL DAILY
Status: DISCONTINUED | OUTPATIENT
Start: 2019-03-26 | End: 2019-03-28 | Stop reason: HOSPADM

## 2019-03-25 RX ORDER — FUROSEMIDE 20 MG/1
20 TABLET ORAL DAILY
Status: DISCONTINUED | OUTPATIENT
Start: 2019-03-25 | End: 2019-03-26

## 2019-03-25 RX ADMIN — ASPIRIN 81 MG: 81 TABLET, COATED ORAL at 09:10

## 2019-03-25 RX ADMIN — POTASSIUM CHLORIDE 10 MEQ: 750 TABLET, EXTENDED RELEASE ORAL at 09:10

## 2019-03-25 RX ADMIN — ACETAMINOPHEN 500 MG: 500 TABLET ORAL at 11:35

## 2019-03-25 RX ADMIN — HEPARIN SODIUM 7500 UNITS: 5000 INJECTION INTRAVENOUS; SUBCUTANEOUS at 01:05

## 2019-03-25 RX ADMIN — AZITHROMYCIN 500 MG: 250 TABLET, FILM COATED ORAL at 17:17

## 2019-03-25 RX ADMIN — PREGABALIN 150 MG: 75 CAPSULE ORAL at 09:10

## 2019-03-25 RX ADMIN — Medication 10 ML: at 21:30

## 2019-03-25 RX ADMIN — FUROSEMIDE 20 MG: 20 TABLET ORAL at 09:58

## 2019-03-25 RX ADMIN — HEPARIN SODIUM 7500 UNITS: 5000 INJECTION INTRAVENOUS; SUBCUTANEOUS at 09:11

## 2019-03-25 RX ADMIN — INSULIN GLARGINE 25 UNITS: 100 INJECTION, SOLUTION SUBCUTANEOUS at 21:31

## 2019-03-25 RX ADMIN — AMLODIPINE BESYLATE 5 MG: 5 TABLET ORAL at 09:11

## 2019-03-25 RX ADMIN — Medication 10 ML: at 13:29

## 2019-03-25 RX ADMIN — LEVOTHYROXINE SODIUM 175 MCG: 150 TABLET ORAL at 06:58

## 2019-03-25 RX ADMIN — CARVEDILOL 25 MG: 12.5 TABLET, FILM COATED ORAL at 09:12

## 2019-03-25 RX ADMIN — HEPARIN SODIUM 7500 UNITS: 5000 INJECTION INTRAVENOUS; SUBCUTANEOUS at 16:23

## 2019-03-25 RX ADMIN — PREGABALIN 150 MG: 75 CAPSULE ORAL at 17:18

## 2019-03-25 RX ADMIN — CEFTRIAXONE 1 G: 1 INJECTION, POWDER, FOR SOLUTION INTRAMUSCULAR; INTRAVENOUS at 09:50

## 2019-03-25 RX ADMIN — INSULIN GLARGINE 25 UNITS: 100 INJECTION, SOLUTION SUBCUTANEOUS at 09:12

## 2019-03-25 RX ADMIN — INSULIN LISPRO 4 UNITS: 100 INJECTION, SOLUTION INTRAVENOUS; SUBCUTANEOUS at 21:29

## 2019-03-25 RX ADMIN — LISINOPRIL 40 MG: 20 TABLET ORAL at 09:10

## 2019-03-25 RX ADMIN — CARVEDILOL 25 MG: 12.5 TABLET, FILM COATED ORAL at 16:23

## 2019-03-25 RX ADMIN — ACETAMINOPHEN 500 MG: 500 TABLET ORAL at 21:29

## 2019-03-25 RX ADMIN — ATORVASTATIN CALCIUM 20 MG: 20 TABLET, FILM COATED ORAL at 09:11

## 2019-03-25 NOTE — PROGRESS NOTES
Orders received, chart reviewed and patient evaluated by Occupational Therapy. Patient is functioning below her baseline with ADL task performance this session d/t c/o 10/10 R knee pain. RN notified. Unable to evaluate Pts dynamic standing balance using her SPC during functional tasks d/t pain levels and Pt politely refusing to ambulate. She required Mod A with doffing her L sock d/t decreased trunk flexion. Recommend acute skilled OT during this admission in order to facilitate patient's safe return to PLOF with performing dynamic ADL tasks. Recommend patient to discharge home without OT services, pending progress in acute skilled OT.   
 
Full evaluation to follow.  
  
Terisa Harada, OTR/L

## 2019-03-25 NOTE — PROGRESS NOTES
Progress Note 3/25/2019 10:52 AM 
NAME: Harini Owens MRN:  [id number] Admit Diagnosis: Acute hypoxemic respiratory failure ([facility] [J96.01] CHF (congestive heart failure) ([facility] [I50.9] [facility]di[facility] mellitus) [facility] [E11.9] Assessment:   
  
1. Dyspnea, edema, orthopnea presents with pulm edema consistent with acute diastolic CHF 2. Distant hx of CAD with ?cath in the 2000's with no intervention 3. Echo 3/2019 EF 60%, mild MR, mod pHTN 4. Sinus 5. HTN 6. DM 
7. Dyslipidemia 8. Anemia unclear no prior endoscopy 9. CT scan with pulm nodule RLL 10. Obese 11. , lives with family, retired , uses a cane 
   
  
            Plan:    
  
Presents with edema and orthopnea suggestive of CHF. pBNP was low, CXR showed mild pulm edema, CT scan did not show clear pulm edema. No PE, but pulmonary nodule. Is feeling better with diuresis. Was transiently on bipap. Likely has component of chf. No ST changes on ECG, negative enzymes. Sinus 
  
1. Cont asa 2. Cont coreg 3. Cont lisinopril 4. Cont amlodipine 5. Change hCTZ to lasix 20mg ipo daily, add kcl 10meq daily, follow bmp 6. Cont statin Stable from cardiac perspective, she will follow with her cardiologist in [place], consider Sleep study due to pulm HTN. Anemia unclear 
   
  
 [x]        High complexity decision making was performed 
  
 
 
 
 
Subjective:  
 
Angélica Davis denies chest pain, dyspnea. Discussed with RN events overnight. Review of Systems: 
 
Symptom Y/N Comments  Symptom Y/N Comments Fever/Chills N   Chest Pain N Poor Appetite N   Edema N   
Cough N   Abdominal Pain N Sputum N   Joint Pain N   
SOB/NEWMAN N   Pruritis/Rash N   
Nausea/vomit N   Tolerating PT/OT Y Diarrhea N   Tolerating Diet Y Constipation N   Other Could NOT obtain due to:   
 
Objective:  
  
Physical Exam: 
 
Last 24hrs VS reviewed since prior progress note. Most recent are: 
 
Visit Vitals /54 (BP 1 Location: Right arm, BP Patient Position: Supine; Head of bed elevated (Comment degrees)) Comment (BP Patient Position): 60 degrees Pulse 63 Temp 99.7 °F (37.6 °C) Resp 20 Ht 5' 4\" (1.626 m) Wt 113.3 kg (249 lb 11.2 oz) SpO2 98% BMI 42.86 kg/m² Intake/Output Summary (Last 24 hours) at 3/25/2019 9503 Last data filed at 3/25/2019 0345 Gross per 24 hour Intake 440 ml Output 1050 ml Net -610 ml General Appearance: Well developed, well nourished, alert & oriented x 3,  
 no acute distress. Ears/Nose/Mouth/Throat: Hearing grossly normal. 
Neck: Supple. Chest: Lungs clear to auscultation bilaterally. Cardiovascular: Regular rate and rhythm, S1S2 normal, no murmur. Abdomen: Soft, non-tender, bowel sounds are active. Extremities: No edema bilaterally. Skin: Warm and dry. PMH/SH reviewed - no change compared to H&P Data Review Telemetry: normal sinus rhythm Lab Data Personally Reviewed: 
 
Recent Labs  
  03/25/19 
0056 03/24/19 
4355 WBC 8.2 7.9 HGB 7.2* 7.7* HCT 22.9* 24.5*  
 281 No results for input(s): INR, PTP, APTT in the last 72 hours. No lab exists for component: INREXT, INREXT Recent Labs  
  03/25/19 
0056 03/24/19 
0415 03/23/19 
2500  142 141  
K 3.8 3.7 4.3  105 107 CO2 32 32 26 BUN 34* 26* 27* CREA 1.40* 1.21* 0.97 GLU 98 131* 151* CA 8.4* 8.8 8.6 Recent Labs  
  03/23/19 
0605 TROIQ <0.05 No results found for: CHOL, CHOLX, CHLST, CHOLV, HDL, LDL, LDLC, DLDLP, TGLX, TRIGL, TRIGP, CHHD, CHHDX Recent Labs  
  03/23/19 2014 SGOT 18 AP 94  
TP 8.3* ALB 3.2*  
GLOB 5.1* No results for input(s): PH, PCO2, PO2 in the last 72 hours. Medications Personally Reviewed: 
 
Current Facility-Administered Medications Medication Dose Route Frequency  furosemide (LASIX) tablet 20 mg  20 mg Oral DAILY  acetaminophen (TYLENOL) tablet 500 mg  500 mg Oral Q4H PRN  
  cefTRIAXone (ROCEPHIN) 1 g in 0.9% sodium chloride (MBP/ADV) 50 mL  1 g IntraVENous Q24H  
 amLODIPine (NORVASC) tablet 5 mg  5 mg Oral DAILY  potassium chloride SR (KLOR-CON 10) tablet 10 mEq  10 mEq Oral DAILY  pregabalin (LYRICA) capsule 150 mg  150 mg Oral BID  insulin lispro (HUMALOG) injection   SubCUTAneous AC&HS  sodium chloride (NS) flush 5-40 mL  5-40 mL IntraVENous Q8H  
 sodium chloride (NS) flush 5-40 mL  5-40 mL IntraVENous PRN  
 atorvastatin (LIPITOR) tablet 20 mg  20 mg Oral DAILY  levothyroxine (SYNTHROID) tablet 175 mcg  175 mcg Oral 6am  
 aspirin delayed-release tablet 81 mg  81 mg Oral DAILY  carvedilol (COREG) tablet 25 mg  25 mg Oral BID WITH MEALS  lisinopril (PRINIVIL, ZESTRIL) tablet 40 mg  40 mg Oral DAILY  docusate sodium (COLACE) capsule 100 mg  100 mg Oral PRN  
 glucose chewable tablet 16 g  4 Tab Oral PRN  
 dextrose (D50W) injection syrg 12.5-25 g  25-50 mL IntraVENous PRN  
 glucagon (GLUCAGEN) injection 1 mg  1 mg IntraMUSCular PRN  
 sodium chloride (NS) flush 5-40 mL  5-40 mL IntraVENous Q8H  
 sodium chloride (NS) flush 5-40 mL  5-40 mL IntraVENous PRN  
 hydrALAZINE (APRESOLINE) 20 mg/mL injection 10 mg  10 mg IntraVENous Q6H PRN  
 heparin (porcine) injection 7,500 Units  7,500 Units SubCUTAneous Q8H  
 insulin glargine (LANTUS) injection 25 Units  25 Units SubCUTAneous BID Jens Chandra MD

## 2019-03-25 NOTE — PROGRESS NOTES
Problem: Breathing Pattern - Ineffective Goal: *Absence of hypoxia Outcome: Progressing Towards Goal 
  
Problem: Falls - Risk of 
Goal: *Absence of Falls Description Document Melissa Raman Fall Risk and appropriate interventions in the flowsheet.  
Outcome: Progressing Towards Goal

## 2019-03-25 NOTE — CONSULTS
[facility] Consult Service Note  Pulmonary, Critical Care, and Sleep Medicine    Name: Aileen Montero MRN: [id number]   : 1935 Hospital: [facility]   Date: 3/25/2019  Admission date: 3/23/2019 Hospital Day: 3       Subjective/Interval History:   Seen earlier today on rounds. Pt is unstable and acutely ill. Medical records and data reviewed. IMPRESSION:   1. Lung nodule Right mid lung field; tumor or pseudotumor? No old scans for comparison  2. Bilateral air space disease- on IV abx  3. Anemia Hgb 7.2   4. Acute hypoxemic respiratory failure ([address] (3/23/2019)- resolved  5. Hypertensive urgency   6. Acute CHF (congestive heart failure) exacerbation (HCC) (3/23/2019) Slight BNP elevation,   7. Body mass index is 42.86 kg/m². 8. CKD  9. diabetes      RECOMMENDATIONS/PLAN:   1. Agree with Empiric IV antibiotics   2. Pt will see us in office in 4-6 weeks to reassess. At that time, will probably get f/u CT scan   3. Transportation may be in issue in the future  4. If lesion does not resolve, will need to consider getting tissue for dx but will be challenging  5. Lesion not amenable to bronchoscopy or needle aspirate; tough location- explained to pt and she understands  6. Prescription drug management with home med reconciliation reviewed          [x] High complexity decision making was performed  [x] See my orders for details      Subjective/Initial History:     I was asked by Larisa Hannon MD to see Aileen Montero  a 80 y.o.  female in consultation for a chief complaint of lung nodule right lung    Excerpts from admission 3/23/2019 or consult notes as follows:     \"  Aileen Montero, 80 y.o. female with PMHx significant for CAD, hypertension, hypercholesterolemia, IDDM, presents via EMS2 to the ED with cc of shortness of breath.   Niece reports patient had called her around 4 in the morning saying she was acutely short of breath requesting on call the ambulance. On EMS arrival patient sats were in the 80s, she had labored breathing with audible wheezes. She was given a neb treatment with no improvement. Placed on CPAP.  1 inch of Nitropaste was applied. Patient states she is feeling fine yesterday. Short of breath early this morning, reports mildly increased swelling in her lower legs. Shortness of breath worse when she lays flat. She reports chest tightness but denies any focal chest pain. Minimal dry cough. Denies any fevers or chills. Reports a remote MI in 2004 with only medical management. Denies any known history of CHF, or COPD.  sHe takes daily baby aspirin     She states her cardiologist is in [place] and she has an appointment with him on Friday. \"    Pt never smoked. Worked at [employer]. Overall feels better than admission. Getting IV abx.  No sputum production      Allergies   Allergen Reactions    Trazodone Anxiety        MAR reviewed and pertinent medications noted or modified as needed     Current Facility-Administered Medications   Medication    furosemide (LASIX) tablet 20 mg    azithromycin (ZITHROMAX) tablet 500 mg    [START ON 3/26/2019] azithromycin (ZITHROMAX) tablet 250 mg    acetaminophen (TYLENOL) tablet 500 mg    cefTRIAXone (ROCEPHIN) 1 g in 0.9% sodium chloride (MBP/ADV) 50 mL    amLODIPine (NORVASC) tablet 5 mg    potassium chloride SR (KLOR-CON 10) tablet 10 mEq    pregabalin (LYRICA) capsule 150 mg    insulin lispro (HUMALOG) injection    atorvastatin (LIPITOR) tablet 20 mg    levothyroxine (SYNTHROID) tablet 175 mcg    aspirin delayed-release tablet 81 mg    carvedilol (COREG) tablet 25 mg    lisinopril (PRINIVIL, ZESTRIL) tablet 40 mg    docusate sodium (COLACE) capsule 100 mg    glucose chewable tablet 16 g    dextrose (D50W) injection syrg 12.5-25 g    glucagon (GLUCAGEN) injection 1 mg    sodium chloride (NS) flush 5-40 mL    sodium chloride (NS) flush 5-40 mL  hydrALAZINE (APRESOLINE) 20 mg/mL injection 10 mg    heparin (porcine) injection 7,500 Units    insulin glargine (LANTUS) injection 25 Units      Patient PCP: Ele Maldonado NP  PMH:  has a past medical history of Diabetes ([facility] Heart attack ([facility] [address] [facility] (), Hyperlipidemia, and Hypertension. PSH:   has a past surgical history that includes hx thyroidectomy (). FHX: family history includes Cancer in her father, mother, and sister; Diabetes in an other family member; Heart Disease in her sister. SHX:  reports that she has never smoked. She has never used smokeless tobacco. She reports that she does not drink alcohol or use drugs. ROS:A comprehensive review of systems was negative except for that written in the HPI. Objective:     Vital Signs: Telemetry:    normal sinus rhythm Intake/Output:   Visit Vitals  /82 (BP 1 Location: Left arm, BP Patient Position: Sitting)   Pulse 66   Temp 99.3 °F (37.4 °C)   Resp 20   Ht 5' 4\" (1.626 m)   Wt 113.3 kg (249 lb 11.2 oz)   SpO2 100%   BMI 42.86 kg/m²       Temp (24hrs), Av.7 °F (37.1 °C), Min:97.5 °F (36.4 °C), Max:99.7 °F (37.6 °C)        O2 Device: Room air         Wt Readings from Last 4 Encounters:   19 113.3 kg (249 lb 11.2 oz)   02/10/19 112.9 kg (249 lb)   16 118.1 kg (260 lb 5.8 oz)          Intake/Output Summary (Last 24 hours) at 3/25/2019 1637  Last data filed at 3/25/2019 1300  Gross per 24 hour   Intake 680 ml   Output 400 ml   Net 280 ml       Last shift:      701 -  190  In: 480 [P.O.:480]  Out: -   Last 3 shifts: 1901 -  0700  In: 680 [P.O.:680]  Out: 2150 [Urine:2150]       Physical Exam:    General:   female; in no respiratory distress and acyanotic, alert and oriented times 3;     HEAD: Normocephalic, without obvious abnormality, atraumatic   EYES: conjunctivae clear.  PERRL,  AN Icteric sclerae   NOSE: nares normal, no drainage, no nasal flaring,    THROAT: mucous membranes moist; Lips, mucosa dry; No Thrush;  crowded airway; tongue midline   Neck: Supple, symmetrical, trachea midline,  No accessory mm use; No Stridor/ cuff leak, No goiter or thyroid tenderness   LYMPH: No abnormally enlarged lymph nodes. in neck    Chest: normal   Lungs: decreased air exchange bibasilar   Heart: Regular rate and rhythm; NO edema   Abdomen: soft, protuberant, distended, without guarding   : ; No Henderson    Musculoskeletal: mild kyphosis; No spine or CVA tenderness; no joint swelling or erythema   Neuro: alert; speech fluent ;withdraws to pain; unable to check gait and station;  following simple commands   Psych: oriented to time, place and person; No agitation;  normal affect;    Skin: Skin unremarkable;    Pulses:Bilateral, Radial, 2+   Capillary refill: normal; well perfused,               Labs:    Recent Labs     03/25/19 0056 03/24/19 0415 03/23/19  0610   WBC 8.2 7.9 11.8*   HGB 7.2* 7.7* 7.8*    281 261     Recent Labs     03/25/19 0056 03/24/19 0415 03/23/19  0610    142 141   K 3.8 3.7 4.3    105 107   CO2 32 32 26   GLU 98 131* 151*   BUN 34* 26* 27*   CREA 1.40* 1.21* 0.97   CA 8.4* 8.8 8.6   LAC  --   --  0.4   ALB  --   --  3.2*   SGOT  --   --  18   ALT  --   --  24     No results for input(s): PH, PCO2, PO2, HCO3, FIO2 in the last 72 hours.   Recent Labs     03/23/19  0610   TROIQ <0.05     No results found for: BNPP, BNP   Lab Results   Component Value Date/Time    Culture result: NO SIGNIFICANT GROWTH 03/23/2019 06:48 AM    Culture result: Culture performed on Fluid swab specimen 02/10/2019 08:19 AM    Culture result: NO GROWTH ON SOLID MEDIA AT 14 DAYS 02/10/2019 08:19 AM    Culture result: (A) 02/10/2019 08:19 AM     STAPHYLOCOCCUS HAEMOLYTICUS ISOLATED FROM THIO BROTH ONLY     Lab Results   Component Value Date/Time    TSH 0.88 08/09/2016 07:35 AM       Imaging:    CXR Results  (Last 48 hours)    None          Results from [facility] on 03/23/19   CTA CHEST W OR W WO CONT    Narrative INDICATION: Acute chest pain    COMPARISON: March 23, 2019 and August 9, 2016    TECHNIQUE:  2.5 mm axial images were obtained from the bases to the lung apices  after the intravenous administration of 100 cc of Isovue-370. Three-dimensional  postprocessing was performed by the technologist with MIP reconstructions. CT  dose reduction was achieved through use of a standardized protocol tailored for  this examination and automatic exposure control for dose modulation. FINDINGS:    THYROID: No nodule. MEDIASTINUM: Multiple enlarged mediastinal nodes new in the interval. A  representative precarinal node series 2 image 64 measures 1.9 x 1.4 cm  WESTLEY: No mass or lymphadenopathy. THORACIC AORTA: Atherosclerotic but no aneurysm  MAIN PULMONARY ARTERY: No acute pulmonary embolus  TRACHEA/BRONCHI: There is bronchial wall thickening especially in the right  lower lobe and minimal wall thickening left lower lobe. ESOPHAGUS: No wall thickening or dilatation. HEART: Normal in size. PLEURA: Small pleural effusions  LUNGS: There is a 1.7 x 1.3 cm soft tissue nodule in the right lower lobe along  the fissure. There are patchy areas of airspace disease most confluent in the  right lower lobe. There is interseptal lobular thickening   INCIDENTALLY IMAGED UPPER ABDOMEN: No focal abnormality. BONES: No destructive bone lesion. Diffuse idiopathic skeletal hyperostosis      Impression IMPRESSION:    1. Nodule 1.7 x 1.3 cm nodule right lower lobe along the oblique fissure with  bilateral airspace disease most confluent at the right lung base and septal  lobular thickening and right pleural effusion. Mediastinal adenopathy. Airspace  disease suggest infection however the soft tissue nodule and mediastinal  adenopathy is concerning for underlying neoplasm.  Recommend short interim  follow-up posttreatment to ensure resolution       This care involved high complexity medical decision making: I personally:  · Reviewed the flowsheet and previous days notes  · Reviewed and summarized records or history from previous days note or discussions with staff, family  · High Risk Drug therapy requiring intensive monitoring for toxicity: eg steroids, pressors, antibiotics  · Reviewed and/or ordered Clinical lab tests  · Reviewed images and/or ordered Radiology tests  · Reviewed the patients ECG / Telemetry  · discussed my assessment/management with : Nursing, for coordination of care    Darion White MD

## 2019-03-25 NOTE — CARDIO/PULMONARY
C/P rehab note- chart reviewed. Cardiac Rehab consult acknowledged. Adm with Acute hypoxemic respiratory failure CHF (congestive heart failure) DM (diabetes mellitus) HX includes HTN, Diabetes,HLD. Nonsmoker. LVEF 61-65%. Red folder with Heart failure zones,HH diet booklet,calendar and magnet reviewed with  Angélica Claros. Educated using teach back method. Discussed diagnosis definition and assessed patient understanding. Reviewed importance of daily weight monitoring and Low Sodium diet (8066-9395 mg. daily). Encouraged activity and rest periods within symptom limitations and as ordered by physician. Reviewed LASIX, purpose of medication, potential side effects, compliance, and what to do if dose is missed. Discussed importance of reporting signs and symptoms of exacerbation, and when to report them to the doctor, to prevent re-hospitalization. Angélica Correa was encouraged to keep all appointments with doctor. She lives with family and states she had help getting groceries from family. States she mostly loves baked chicken and broccoli. Reminded to have family read labels closely for sodium content. States she also has a scale. Angélica Correa could benefit from further education on the following HF topics: diet and activity.

## 2019-03-25 NOTE — CONSULTS
Palliative Medicine Consult  Kaleb: [phone] ()    Patient Name: Vero Weinberg  YOB: 1935    Date of Initial Consult: 3/25/19  Reason for Consult: Care decisions   Requesting Provider: Anisha Mayo   Primary Care Physician: Rome Rogers NP     SUMMARY:   Vero Weinberg is a 80 y.o. with a past history of DM, CAD w/ MI in  who was admitted on 3/23/2019 from home via EMS w/ worsening SOB. On BIPAP and received Lasix in ED. Echo w/ EF 60%, mild MR, mod pulm HTN. Cardiology adjusting medications and recommend sleep study. On abx for CT chest findings of b/l airspace disease but also w/ 1.7 x 1.3cm RLL nodule and mediastinal adenopathy concerning for neoplasm. Will need f/u CT scan and possible bx, will see Dr Tiesha Carpio w/ pulm in clinic in 4-6 weeks. Lives in [place], has her medical team in [place]. Current medical issues leading to Palliative Medicine involvement include: care decisions. Social: Pt ,   about 6-7 years ago. Has 4 children, one in [facility] w/ whom she lives (son Sarai). Has siblings, nieces and nephews around too. Can do her ADLs usually, family helps w/ IADLs. No recent hospitalizations. Retired - worked at [employer]. PALLIATIVE DIAGNOSES:   1. Shortness of breath, improved  2. Debility due to SOB  3. Fatigue  4. Advanced care planning        PLAN:   1. Meet w/ pt who is alert and engaging. Feels significantly better since admission. Learn that pt has not had recent hospital stays, most all of her medical care is in [place]. 2. Discuss palliative medicine as extra support while here in the hospital, denisse when someone has serious illness. 3. Pt aware of pulmonary issues- that will need a repeat CT scan to see the RLL nodule better and may need bx. Pt does not seem worried about f/u. Trusts her medical team.   4. Ask if she has ever completed AMD, she has not.  Discuss what this document is, and why it is important to complete. 5. She thinks that for her mPOA, she would not want her son w/ whom she lives Arely Yeboah in that a role. Aware that the person she assigns should be able to honor whatever her wishes would be in the future- she thinks she would name her eldest son who lives in [facility], Lakesha, and her sister Ant Dobbins- since they are also the ones who are in charge of her burial arrangements. She will think about it today and is amenable to completion of one tmrw. Talk about living will a bit, and pt feels that if there is nothing that can be done in the future to have her live better and longer- she would just want to be kept comfortable. We can write this information down in the advanced directive tmrw. 6. Do not talk about code status today, but will talk about it when completing living will - may be good to have conversation w/ her medical agents on the phone and/or to give copy of DDNR to read and talk about more w/ her PCP. No hx of intubation, first time she was on BIPAP was this admission. 7. Goals clear for recovery from acute issues and get home. To try and complete advanced medical directive tmrw. 8. Initial consult note routed to primary continuity provider and/or primary health care team members  9. Communicated plan of care with: Suy[facility] [address] Team     GOALS OF CARE / TREATMENT PREFERENCES:     GOALS OF CARE:  Patient/Health Care Proxy Stated Goals: (To feel better and get home)    TREATMENT PREFERENCES:   Code Status: Full Code    Advance Care Planning:  [x] The [facility] Interdisciplinary Team has updated the ACP Navigator with Dia 8 and Patient Capacity    Decision maker to be determined. Advance Care Planning 3/23/2019   Patient's Healthcare Decision Maker is: -   Confirm Advance Directive None   Patient Would Like to Complete Advance Directive No       Medical Interventions: Full interventions     Other Instructions:          Other:    As far as possible, the palliative care team has discussed with patient / health care proxy about goals of care / treatment preferences for patient. HISTORY:     History obtained from: Pt, chart     CHIEF COMPLAINT: I feel better    HPI/SUBJECTIVE:    The patient is:   [x] Verbal and participatory  [] Non-participatory due to:     Pt sitting in chair, in NAD. Breathing much easier on RA. No pain. Feels at her baseline function - w/ therapies, they noted that pt c/o R knee pain w/ movement, did not bring this up when I talked w/ her. Clinical Pain Assessment (nonverbal scale for severity on nonverbal patients):   Clinical Pain Assessment  Severity: 0          Duration: for how long has pt been experiencing pain (e.g., 2 days, 1 month, years)  Frequency: how often pain is an issue (e.g., several times per day, once every few days, constant)     FUNCTIONAL ASSESSMENT:     Palliative Performance Scale (PPS):  PPS: 50       PSYCHOSOCIAL/SPIRITUAL SCREENING:     Palliative IDT has assessed this patient for cultural preferences / practices and a referral made as appropriate to needs (Cultural Services, Patient Advocacy, Ethics, etc.)    Any spiritual / Gnosticist concerns:  [] Yes /  [x] No    Caregiver Burnout:  [] Yes /  [x] No /  [] No Caregiver Present      Anticipatory grief assessment:   [x] Normal  / [] Maladaptive       ESAS Anxiety: Anxiety: 0    ESAS Depression: Depression: 0        REVIEW OF SYSTEMS:     Positive and pertinent negative findings in ROS are noted above in HPI. The following systems were [x] reviewed / [] unable to be reviewed as noted in HPI  Other findings are noted below. Systems: constitutional, ears/nose/mouth/throat, respiratory, gastrointestinal, genitourinary, musculoskeletal, integumentary, neurologic, psychiatric, endocrine. Positive findings noted below.   Modified ESAS Completed by: provider   Fatigue: 3 Drowsiness: 0   Depression: 0 Pain: 0   Anxiety: 0 Nausea: 0   Anorexia: 0 Dyspnea: 0     Constipation: No     Stool Occurrence(s): 1        PHYSICAL EXAM:     From RN flowsheet:  Wt Readings from Last 3 Encounters:   03/25/19 249 lb 11.2 oz (113.3 kg)   02/10/19 249 lb (112.9 kg)   08/09/16 260 lb 5.8 oz (118.1 kg)     Blood pressure 149/50, pulse 64, temperature 97.5 °F (36.4 °C), resp. rate 20, height 5' 4\" (1.626 m), weight 249 lb 11.2 oz (113.3 kg), SpO2 99 %. Pain Scale 1: Numeric (0 - 10)  Pain Intensity 1: 7  Pain Onset 1: chronic  Pain Location 1: Knee  Pain Orientation 1: Right  Pain Description 1: Throbbing, Shooting  Pain Intervention(s) 1: Meditation      Constitutional: awake, alert, oriented, NAD  Eyes: pupils equal, anicteric  ENMT: no nasal discharge, moist mucous membranes  Respiratory: breathing not labored at rest   Gastrointestinal: soft   Musculoskeletal: no deformity  Skin: warm, dry  Neurologic: following commands, moving all extremities  Psychiatric: full affect, no hallucinations         HISTORY:     Principal Problem:    Acute hypoxemic respiratory failure (HCC) (3/23/2019)    Active Problems:    CHF (congestive heart failure) ([address] (3/23/2019)      DM (diabetes mellitus) ([address] (3/23/2019)      HTN (hypertension) (3/24/2019)      Hyperlipidemia (3/24/2019)      Chronic renal insufficiency (3/24/2019)      Anemia (3/24/2019)      Past Medical History:   Diagnosis Date    Diabetes ([address]     Heart attack ([address] 2009    Hyperlipidemia     Hypertension       Past Surgical History:   Procedure Laterality Date    HX THYROIDECTOMY  2005      Family History   Problem Relation Age of Onset    Cancer Mother     Cancer Father     Cancer Sister     Heart Disease Sister     Diabetes Other         multiple family members      History reviewed, no pertinent family history.   Social History     Tobacco Use    Smoking status: Never Smoker    Smokeless tobacco: Never Used   Substance Use Topics    Alcohol use: No     Allergies   Allergen Reactions    Trazodone Anxiety      Current Facility-Administered Medications   Medication Dose Route Frequency    furosemide (LASIX) tablet 20 mg  20 mg Oral DAILY    acetaminophen (TYLENOL) tablet 500 mg  500 mg Oral Q4H PRN    cefTRIAXone (ROCEPHIN) 1 g in 0.9% sodium chloride (MBP/ADV) 50 mL  1 g IntraVENous Q24H    amLODIPine (NORVASC) tablet 5 mg  5 mg Oral DAILY    potassium chloride SR (KLOR-CON 10) tablet 10 mEq  10 mEq Oral DAILY    pregabalin (LYRICA) capsule 150 mg  150 mg Oral BID    insulin lispro (HUMALOG) injection   SubCUTAneous AC&HS    atorvastatin (LIPITOR) tablet 20 mg  20 mg Oral DAILY    levothyroxine (SYNTHROID) tablet 175 mcg  175 mcg Oral 6am    aspirin delayed-release tablet 81 mg  81 mg Oral DAILY    carvedilol (COREG) tablet 25 mg  25 mg Oral BID WITH MEALS    lisinopril (PRINIVIL, ZESTRIL) tablet 40 mg  40 mg Oral DAILY    docusate sodium (COLACE) capsule 100 mg  100 mg Oral PRN    glucose chewable tablet 16 g  4 Tab Oral PRN    dextrose (D50W) injection syrg 12.5-25 g  25-50 mL IntraVENous PRN    glucagon (GLUCAGEN) injection 1 mg  1 mg IntraMUSCular PRN    sodium chloride (NS) flush 5-40 mL  5-40 mL IntraVENous Q8H    sodium chloride (NS) flush 5-40 mL  5-40 mL IntraVENous PRN    hydrALAZINE (APRESOLINE) 20 mg/mL injection 10 mg  10 mg IntraVENous Q6H PRN    heparin (porcine) injection 7,500 Units  7,500 Units SubCUTAneous Q8H    insulin glargine (LANTUS) injection 25 Units  25 Units SubCUTAneous BID          LAB AND IMAGING FINDINGS:     Lab Results   Component Value Date/Time    WBC 8.2 03/25/2019 12:56 AM    HGB 7.2 (L) 03/25/2019 12:56 AM    PLATELET 442 41/45/2574 12:56 AM     Lab Results   Component Value Date/Time    Sodium 142 03/25/2019 12:56 AM    Potassium 3.8 03/25/2019 12:56 AM    Chloride 104 03/25/2019 12:56 AM    CO2 32 03/25/2019 12:56 AM    BUN 34 (H) 03/25/2019 12:56 AM    Creatinine 1.40 (H) 03/25/2019 12:56 AM    Calcium 8.4 (L) 03/25/2019 12:56 AM    Magnesium 2.0 08/10/2016 04:40 AM      Lab Results   Component Value Date/Time    AST (SGOT) 18 03/23/2019 06:10 AM    Alk. phosphatase 94 03/23/2019 06:10 AM    Protein, total 8.3 (H) 03/23/2019 06:10 AM    Albumin 3.2 (L) 03/23/2019 06:10 AM    Globulin 5.1 (H) 03/23/2019 06:10 AM     No results found for: INR, PTMR, PTP, PT1, PT2, APTT   No results found for: IRON, FE, TIBC, IBCT, PSAT, FERR   No results found for: PH, PCO2, PO2  No components found for: GLPOC   No results found for: CPK, CKMB             Total time: 70 min   Counseling / coordination time, spent as noted above: 45 min   > 50% counseling / coordination?: yes    Prolonged service was provided for  []30 min   []75 min in face to face time in the presence of the patient, spent as noted above. Time Start:   Time End:   Note: this can only be billed with 58885 (initial) or 14273 (follow up). If multiple start / stop times, list each separately.

## 2019-03-25 NOTE — PROGRESS NOTES
Reason for Admission:  Acute Hypoxemic Respiratory Failure, CHF, DM  
            
RRAT Score:     24 high risk Resources/supports as identified by patient/family:   Good family support Top Challenges facing patient (as identified by patient/family and CM): Finances/Medication cost?     No issues - [employer] Transportation? Pt's son and family transport pt Support system or lack thereof - good family support Living arrangements? Lives with her son in a 1 story home with 3 steps to the entrance Self-care/ADLs/Cognition? Receives assistance with her ADL's and IADL's from family/alert & oriented Current Advanced Directive/Advance Care Plan:  Full code/not on file Plan for utilizing home health:  Hospital to home visit Likelihood of readmission:  Moderate risk Transition of Care Plan:    Home with f/u appts and hospital to home visit Pt is a 80 y.o  [place] female admitted with Acute Hypoxemic Respiratory Failure, CHF, DM. Pt was alert, oriented resting in the chair. Demographic information verified and all is correct. Pt lives with her son in a 1 story home with 3 steps to the entrance. Prior to admission, pt's family assisted her at times with her ADL's and IADL's. Pt's family transports her and grocery shops for pt. Preferred pharmacy is [facility] in [facility]. Pt had home health and has gone to a SNF in the past. Therapy was not recommending HH. CM noted Heart Failure cm consult. CM discussed Hospital to Home visit and she was in agreement. FOC form signed and referral sent via connect [facility]. Pt's family will transport pt home by car at discharge. CM will continue to follow for discharge planning needs. Care Management Interventions PCP Verified by CM: Henrry Amanda NP) Palliative Care Criteria Met (RRAT>21 & CHF Dx)?: Yes 
Palliative Consult Recommended?: Yes Mode of Transport at Discharge: Other (see comment)(family can transport pt home by car ) Transition of Care Consult (CM Consult): Discharge Planning Discharge Durable Medical Equipment: No(cane and walker ) Physical Therapy Consult: Yes Occupational Therapy Consult: Yes Speech Therapy Consult: No 
Current Support Network: Own Home, Other(lives with her son in a 1 story home with 3 steps to the entrance) Confirm Follow Up Transport: Family Plan discussed with Pt/Family/Caregiver: Yes Freedom of Choice Offered: Yes Discharge Location Discharge Placement: Home [facility], [address]

## 2019-03-25 NOTE — PROGRESS NOTES
Problem: Self Care Deficits Care Plan (Adult) Goal: *Acute Goals and Plan of Care (Insert Text) Description Occupational Therapy Goals Initiated 3/25/2019 1. Patient will perform grooming tasks standing at sink with modified independence using least restrictive device within 7 day(s). 2.  Patient will perform UB and LB bathing/dressing tasks seated with modified independence (increased time and adaptive strategies d/t pain) within 7 day(s). 3.  Patient will perform all aspects of toileting with modified independence within 7 day(s). 4.  Patient will perform item retrieval at high/low levels in preparation for ADL tasks with with Modified Indep and good dynamic standing balance using least restrictive device within 7 days. Outcome: Progressing Towards Goal 
 OCCUPATIONAL THERAPY EVALUATION Patient: Brent Zavala (80 y.o. female) Date: 3/25/2019 Primary Diagnosis: [facility] [J96.01] CHF (congestive heart failure) ([facility] [I50.9] DM (diabetes mellitus) ([facility] [E11.9] Precautions: Fall Risk ASSESSMENT : 
Based on the objective data described below, the patient presents with decreased functional mobility and decreased functional activity tolerance following episode of acute respiratory failure this admission. Cleared for therapy and Pt agreeable to participate at chair level d/t c/o 10/10 pain in R knee with observable edema. RN notified; ice pack placed on knee and pain medications administered. Pt politely declined to perform sit<>stand transfer to RW and toilet transfer. UE ROM/strength are generally decreased yet functional. Pt is performing UB ADLs with Setup assist and LB ADLs with Mod-Max Assist this session d/t increased R. Knee pain and inability to flex trunk forward to reach bilat toes. Pt unable to bring L foot to R. Knee as well.  Anticipate Pts functional mobility and endurance will improve once pain is controlled in her R knee. Recommend skilled acute OT to maximize Pts functional mobility and activity tolerance in order to return to PLOF with ADL/IADL task performance. Patient will benefit from skilled intervention to address the above impairments. Patient?s rehabilitation potential is considered to be Good Factors which may influence rehabilitation potential include: ? None noted ? Mental ability/status ? Medical condition ? Home/family situation and support systems ? Safety awareness ? Pain tolerance/management ? Other: PLAN : 
Recommendations and Planned Interventions: 
?               Self Care Training                  ? Therapeutic Activities ? Functional Mobility Training    ? Cognitive Retraining 
? Therapeutic Exercises           ? Endurance Activities ? Balance Training                   ? Neuromuscular Re-Education ? Visual/Perceptual Training     ? Home Safety Training 
? Patient Education                 ? Family Training/Education ? Other (comment): Frequency/Duration: Patient will be followed by occupational therapy 4 times a week to address goals. Discharge Recommendations: Home without services Further Equipment Recommendations for Discharge: TBD based on progress SUBJECTIVE:  
Patient stated ? My knee hurts so bad\" OBJECTIVE DATA SUMMARY:  
HISTORY:  
Past Medical History:  
Diagnosis Date Diabetes ([facility] Heart attack [facility]) 2009 Hyperlipidemia Hypertension Past Surgical History:  
Procedure Laterality Date HX THYROIDECTOMY  2005 Prior Level of Function/Environment/Context: Lives with son in one story home in [place]. Retired . Mostly sedentary.  Reports she completed ADL/IADL tasks with Modified [place], requiring increased time, and using SPC for functional mobility. Has RW available from a prior hospitalization, yet does not use. Does not drive; relies on friends/family to take her to appointments and the grocery store. Home Situation Home Environment: Private residence # Steps to Enter: 3 Hand Rails : Bilateral 
One/Two Story Residence: One story Living Alone: Yes(nieces/nephews stay occasionally) Support Systems: Child(adrian) Patient Expects to be Discharged to[de-identified] Private residence Current DME Used/Available at Home: Tank beach, straight, Walker, rolling, Shower chair(mostly uses SPC) Tub or Shower Type: Tub/Shower combination Hand dominance: Right EXAMINATION OF PERFORMANCE DEFICITS: 
Cognitive/Behavioral Status: 
Neurologic State: Alert Orientation Level: Oriented X4 Cognition: Appropriate decision making; Appropriate safety awareness Perception: Appears intact Perseveration: No perseveration noted Safety/Judgement: Awareness of environment; Insight into deficits Skin: Intact Edema: Mild edema observed at R knee in conjunction with Pt c/o 10/10 R knee pain. Hearing: Auditory Auditory Impairment: Hard of hearing, left side Vision/Perceptual:   
Acuity: Within Defined Limits Corrective Lenses: Glasses Range of Motion: 
AROM: Generally decreased, functional 
PROM: Generally decreased, functional 
  
Strength: 
Strength: Generally decreased, functional 
  
Coordination: 
Coordination: Generally decreased, functional 
Fine Motor Skills-Upper: Left Intact; Right Intact Gross Motor Skills-Upper: Left Intact; Right Intact Tone & Sensation: 
Tone: Normal 
Sensation: Intact Balance: 
Sitting: Intact; Without support Standing: Intact; With support(dynamic n/a due to R knee p!) Functional Mobility and Transfers for ADLs: Did not test; Pt politely refused ADL Assessment: 
Feeding: Independent Oral Facial Hygiene/Grooming: Independent Bathing: Moderate assistance Upper Body Dressing: Setup Lower Body Dressing: Moderate assistance;Maximum assistance ADL Intervention and task modifications: 
  
Cognitive Retraining Safety/Judgement: Awareness of environment; Insight into deficits Functional Measure: 
 
Barthel Index: 
Bathin Bladder: 10 Bowels: 10 
Groomin Dressin Feeding: 10 Mobility: 5 Stairs: 5 Toilet Use: 5 Transfer (Bed to Chair and Back): 5 Total: 60/100 Percentage of impairment  
0% 1-19% 20-39% 40-59% 60-79% 80-99% 100% Barthel Score 0-100 100 99-80 79-60 59-40 20-39 1-19 
 0 The Barthel ADL Index: Guidelines 1. The index should be used as a record of what a patient does, not as a record of what a patient could do. 2. The main aim is to establish degree of independence from any help, physical or verbal, however minor and for whatever reason. 3. The need for supervision renders the patient not independent. 4. A patient's performance should be established using the best available evidence. Asking the patient, friends/relatives and nurses are the usual sources, but direct observation and common sense are also important. However direct testing is not needed. 5. Usually the patient's performance over the preceding 24-48 hours is important, but occasionally longer periods will be relevant. 6. Middle categories imply that the patient supplies over 50 per cent of the effort. 7. Use of aids to be independent is allowed. Joyce Aguirre., Barthel, DHenriqueW. (4885). Functional evaluation: the Barthel Index. [address] [facility] (14)2. CELINA Morin, Lexa Kauffman., Rashad Alberto., Prasanth, [address] [place] [address] (). Measuring the change indisability after inpatient rehabilitation; comparison of the responsiveness of the Barthel Index and Functional [place] Measure. Journal of Neurology, Neurosurgery, and Psychiatry, 66(4), 853-797. EDGAR Call, HARPER Miranda, & Vinh Damon M.A. (2004.) Assessment of post-stroke quality of life in cost-effectiveness studies: The usefulness of the Barthel Index and the EuroQoL-5D. [facility], 13, 929-11 Occupational Therapy Evaluation Charge Determination History Examination Decision-Making LOW Complexity : Brief history review  LOW Complexity : 1-3 performance deficits relating to physical, cognitive , or psychosocial skils that result in activity limitations and / or participation restrictions  LOW Complexity : No comorbidities that affect functional and no verbal or physical assistance needed to complete eval tasks Based on the above components, the patient evaluation is determined to be of the following complexity level: LOW Pain: 
Pain Scale 1: Numeric (0 - 10) Pain Intensity 1: 7 Pain Location 1: Knee Pain Orientation 1: Right Pain Description 1: Throbbing; Shooting Pain Intervention(s) 1: Meditation Activity Tolerance:  
Poor this session. Anticipate Pts activity tolerance will improve with pain medication and once R knee pain resolves as Pt has high PLOF. Please refer to the flowsheet for vital signs taken during this treatment. After treatment:  
? Patient left in no apparent distress sitting up in chair ? Patient left in no apparent distress in bed 
? Call bell left within reach ? Nursing notified ? Caregiver present ? Chair alarm activated COMMUNICATION/EDUCATION:  
The patient?s plan of care was discussed with: Physical Therapist, Registered Nurse and Patient . ? Home safety education was provided and the patient/caregiver indicated understanding. ? Patient/family have participated as able in goal setting and plan of care. ? Patient/family agree to work toward stated goals and plan of care. ? Patient understands intent and goals of therapy, but is neutral about his/her participation. ? Patient is unable to participate in goal setting and plan of care. Thank you for this referral. 
Kalen Gilbert, OTR/L Time Calculation: 17 mins

## 2019-03-25 NOTE — PROGRESS NOTES
Bedside shift change report given to Marlon Hennessy RN (oncoming nurse). Report included the following information SBAR, Kardex, ED Summary, Procedure Summary, Intake/Output, MAR and Recent Results. SHIFT SUMMARY: 
0700: Bedside shift change report given to Leni Ortez RN (oncoming nurse) by Nuvia Avelar RN (offgoing nurse). Report included the following information SBAR, Kardex, ED Summary, Procedure Summary, Intake/Output, MAR and Recent Results. CONCERNS TO ADDRESS WITH MD: 
N/a -- pt had uneventful shift, s[pent most of the day in the chair [address] [facility] [place] NURSING NOTE Admission Date 3/23/2019 Admission Diagnosis Acute hypoxemic respiratory failure ([facility] [J96.01] CHF (congestive heart failure) ([facility] [I50.9] DM (diabetes mellitus) ([facility] [E11.9] Consults IP CONSULT TO CARDIOLOGY 
IP CONSULT TO PALLIATIVE CARE - PROVIDER 
IP CONSULT TO PULMONOLOGY Cardiac Monitoring [x] Yes [] No  
  
Purposeful Hourly Rounding [x] Yes   
Estelle Score Total Score: 3 Estelle score 3 or > [x] Bed Alarm [] Avasys [] 1:1 sitter [] Patient refused (Signed refusal form in chart) Ayaan Score Ayaan Score: 17 Ayaan score 14 or < [] PMT consult [] Wound Care consult  
 []  Specialty bed  [] Nutrition consult Influenza Vaccine Received Flu Vaccine for Current Season (usually Sept-March): No  
 Patient/Guardian Refused (Notify MD): No  
  
Oxygen needs? [x] Room air Oxygen @  []1L    []2L    []3L   []4L    []5L   []6L via NC Chronic home O2 use? [] Yes [] No 
Perform O2 challenge test and document in progress note using smartphrase (.Homeoxygen) Last bowel movement Last Bowel Movement Date: 03/24/19 Urinary Catheter LDAs Peripheral IV 03/25/19 Left Antecubital (Active) Site Assessment Clean, dry, & intact 3/25/2019  2:40 PM  
Phlebitis Assessment 0 3/25/2019  2:40 PM  
Infiltration Assessment 0 3/25/2019  2:40 PM  
Dressing Status Clean, dry, & intact 3/25/2019  2:40 PM  
 Dressing Type Transparent;Tape 3/25/2019  2:40 PM  
Hub Color/Line Status Blue;Patent; Flushed 3/25/2019  2:40 PM  
                  
  
Readmission Risk Assessment Tool Score High Risk   
      
 24 Total Score 3 Has Seen PCP in Last 6 Months (Yes=3, No=0)  
 5 Pt. Coverage (Medicare=5 , Medicaid, or Self-Pay=4) 16 Charlson Comorbidity Score (Age + Comorbid Conditions) Criteria that do not apply:  
 . Living with Significant Other. Assisted Living. LTAC. SNF. or  
Rehab Patient Length of Stay (>5 days = 3) IP Visits Last 12 Months (1-3=4, 4=9, >4=11) Expected Length of Stay 4d 2h Actual Length of Stay 2 Problem: Breathing Pattern - Ineffective Goal: *Absence of hypoxia Outcome: Progressing Towards Goal 
Goal: *Use of effective breathing techniques Outcome: Progressing Towards Goal 
  
Problem: Patient Education: Go to Patient Education Activity Goal: Patient/Family Education Outcome: Progressing Towards Goal 
  
Problem: Falls - Risk of 
Goal: *Absence of Falls Description Document Micaela Bynum Fall Risk and appropriate interventions in the flowsheet. Outcome: Progressing Towards Goal 
  
Problem: Patient Education: Go to Patient Education Activity Goal: Patient/Family Education Outcome: Progressing Towards Goal 
  
Problem: Pressure Injury - Risk of 
Goal: *Prevention of pressure injury Description Document Ayaan Scale and appropriate interventions in the flowsheet. Outcome: Progressing Towards Goal 
  
Problem: Patient Education: Go to Patient Education Activity Goal: Patient/Family Education Outcome: Progressing Towards Goal 
  
Problem: Patient Education: Go to Patient Education Activity Goal: Patient/Family Education Outcome: Progressing Towards Goal 
  
Problem: Patient Education: Go to Patient Education Activity Goal: Patient/Family Education Outcome: Progressing Towards Goal 
  
Problem: Heart Failure: Day 1 
 Goal: Off Pathway (Use only if patient is Off Pathway) Outcome: Progressing Towards Goal 
Goal: Activity/Safety Outcome: Progressing Towards Goal 
Goal: Consults, if ordered Outcome: Progressing Towards Goal 
Goal: Diagnostic Test/Procedures Outcome: Progressing Towards Goal 
Goal: Nutrition/Diet Outcome: Progressing Towards Goal 
Goal: Discharge Planning Outcome: Progressing Towards Goal 
Goal: Medications Outcome: Progressing Towards Goal 
Goal: Respiratory Outcome: Progressing Towards Goal 
Goal: Treatments/Interventions/Procedures Outcome: Progressing Towards Goal 
Goal: Psychosocial 
Outcome: Progressing Towards Goal 
Goal: *Oxygen saturation within defined limits Outcome: Progressing Towards Goal 
Goal: *Hemodynamically stable Outcome: Progressing Towards Goal 
Goal: *Optimal pain control at patient's stated goal 
Outcome: Progressing Towards Goal 
Goal: *Anxiety reduced or absent Outcome: Progressing Towards Goal 
  
Problem: Heart Failure: Day 2 Goal: Off Pathway (Use only if patient is Off Pathway) Outcome: Progressing Towards Goal 
Goal: Activity/Safety Outcome: Progressing Towards Goal 
Goal: Consults, if ordered Outcome: Progressing Towards Goal 
Goal: Diagnostic Test/Procedures Outcome: Progressing Towards Goal 
Goal: Nutrition/Diet Outcome: Progressing Towards Goal 
Goal: Discharge Planning Outcome: Progressing Towards Goal 
Goal: Medications Outcome: Progressing Towards Goal 
Goal: Respiratory Outcome: Progressing Towards Goal 
Goal: Treatments/Interventions/Procedures Outcome: Progressing Towards Goal 
Goal: Psychosocial 
Outcome: Progressing Towards Goal 
Goal: *Oxygen saturation within defined limits Outcome: Progressing Towards Goal 
Goal: *Hemodynamically stable Outcome: Progressing Towards Goal 
Goal: *Optimal pain control at patient's stated goal 
Outcome: Progressing Towards Goal 
Goal: *Anxiety reduced or absent Outcome: Progressing Towards Goal 
 Goal: *Demonstrates progressive activity Outcome: Progressing Towards Goal 
  
Problem: Heart Failure: Day 3 Goal: Off Pathway (Use only if patient is Off Pathway) Outcome: Progressing Towards Goal 
Goal: Activity/Safety Outcome: Progressing Towards Goal 
Goal: Diagnostic Test/Procedures Outcome: Progressing Towards Goal 
Goal: Nutrition/Diet Outcome: Progressing Towards Goal 
Goal: Discharge Planning Outcome: Progressing Towards Goal 
Goal: Medications Outcome: Progressing Towards Goal 
Goal: Respiratory Outcome: Progressing Towards Goal 
Goal: Treatments/Interventions/Procedures Outcome: Progressing Towards Goal 
Goal: Psychosocial 
Outcome: Progressing Towards Goal 
Goal: *Oxygen saturation within defined limits Outcome: Progressing Towards Goal 
Goal: *Hemodynamically stable Outcome: Progressing Towards Goal 
Goal: *Optimal pain control at patient's stated goal 
Outcome: Progressing Towards Goal 
Goal: *Anxiety reduced or absent Outcome: Progressing Towards Goal 
Goal: *Demonstrates progressive activity Outcome: Progressing Towards Goal 
  
Problem: Heart Failure: Day 4 Goal: Off Pathway (Use only if patient is Off Pathway) Outcome: Progressing Towards Goal 
Goal: Activity/Safety Outcome: Progressing Towards Goal 
Goal: Diagnostic Test/Procedures Outcome: Progressing Towards Goal 
Goal: Nutrition/Diet Outcome: Progressing Towards Goal 
Goal: Discharge Planning Outcome: Progressing Towards Goal 
Goal: Medications Outcome: Progressing Towards Goal 
Goal: Respiratory Outcome: Progressing Towards Goal 
Goal: Treatments/Interventions/Procedures Outcome: Progressing Towards Goal 
Goal: Psychosocial 
Outcome: Progressing Towards Goal 
Goal: *Oxygen saturation within defined limits Outcome: Progressing Towards Goal 
Goal: *Hemodynamically stable Outcome: Progressing Towards Goal 
Goal: *Optimal pain control at patient's stated goal 
Outcome: Progressing Towards Goal 
 Goal: *Anxiety reduced or absent Outcome: Progressing Towards Goal 
Goal: *Demonstrates progressive activity Outcome: Progressing Towards Goal 
  
Problem: Heart Failure: Day 5 Goal: Off Pathway (Use only if patient is Off Pathway) Outcome: Progressing Towards Goal 
Goal: Activity/Safety Outcome: Progressing Towards Goal 
Goal: Diagnostic Test/Procedures Outcome: Progressing Towards Goal 
Goal: Nutrition/Diet Outcome: Progressing Towards Goal 
Goal: Discharge Planning Outcome: Progressing Towards Goal 
Goal: Medications Outcome: Progressing Towards Goal 
Goal: Respiratory Outcome: Progressing Towards Goal 
Goal: Treatments/Interventions/Procedures Outcome: Progressing Towards Goal 
Goal: Psychosocial 
Outcome: Progressing Towards Goal 
  
Problem: Heart Failure: Discharge Outcomes Goal: *Demonstrates ability to perform prescribed activity without shortness of breath or discomfort Outcome: Progressing Towards Goal 
Goal: *Left ventricular function assessment completed prior to or during stay, or planned for post-discharge Outcome: Progressing Towards Goal 
Goal: *ACEI prescribed if LVEF less than 40% and no contraindications or ARB prescribed Outcome: Progressing Towards Goal 
Goal: *Verbalizes understanding and describes prescribed diet Outcome: Progressing Towards Goal 
Goal: *Verbalizes understanding/describes prescribed medications Outcome: Progressing Towards Goal 
Goal: *Describes available resources and support systems Description 
(eg: Home Health, Palliative Care, Advanced Medical Directive) Outcome: Progressing Towards Goal 
Goal: *Describes smoking cessation resources Outcome: Progressing Towards Goal 
Goal: *Understands and describes signs and symptoms to report to providers(Stroke Metric) Outcome: Progressing Towards Goal 
Goal: *Describes/verbalizes understanding of follow-up/return appt Description 
(eg: to physicians, diabetes treatment coordinator, and other resources Outcome: Progressing Towards Goal 
Goal: *Describes importance of continuing daily weights and changes to report to physician Outcome: Progressing Towards Goal

## 2019-03-25 NOTE — PROGRESS NOTES
Problem: Breathing Pattern - Ineffective Goal: *Absence of hypoxia Outcome: Progressing Towards Goal 
  
Problem: Patient Education: Go to Patient Education Activity Goal: Patient/Family Education Outcome: Progressing Towards Goal

## 2019-03-25 NOTE — PROGRESS NOTES
Problem: Mobility Impaired (Adult and Pediatric) Goal: *Acute Goals and Plan of Care (Insert Text) Description Physical Therapy Goals Initiated 3/24/2019 1. Patient will move from supine to sit and sit to supine  in bed with independence within 7 day(s). 2.  Patient will transfer from bed to chair and chair to bed with independence using the least restrictive device within 7 day(s). 3.  Patient will perform sit to stand with independence within 7 day(s). 4.  Patient will ambulate with independence for 150 feet with the least restrictive device within 7 day(s) with o2 sats in mid 90s. 5.  Patient will ascend/descend 3 stairs with 1 handrail(s) with min A/CGA within 7 day(s). Outcome: Not Progressing Towards Goal due to R knee pain PHYSICAL THERAPY TREATMENT Patient: Brent Zavala (80 y.o. female) Date: 3/25/2019 Diagnosis: Acute hypoxemic respiratory failure ([facility] [address] [J96.01] CHF (congestive heart failure) ([facility] [address] [I50.9] DM (diabetes mellitus) ([facility] [address] [E11.9] Acute hypoxemic respiratory failure ([facility] [address] Precautions:   
Chart, physical therapy assessment, plan of care and goals were reviewed. ASSESSMENT: 
Pt received sitting in recliner upon arrival, reporting R knee pain, however agreeable to PT session. Pt participated in RLE exercises to prepare R knee for gait. Pt initially with difficulty bending the R knee, but with exercise it became more pliable. Pt required use of RW for ambulation today due to increased R knee pain. Pt with limited ambulation distance this visit due to R knee P!.  Pt reports that she has flare ups of knee pain at home and uses her walker when that happens. Pt reports, other than the knee pain, she is moving as she does at home. Pt should be fine to discharge home without formal services. Progression toward goals: 
?    Improving appropriately and progressing toward goals ? Improving slowly and progressing toward goals ?    Not making progress toward goals and plan of care will be adjusted( limited today by R knee pain, will not adjust goals PLAN: 
Patient continues to benefit from skilled intervention to address the above impairments. Continue treatment per established plan of care. Discharge Recommendations:  None Further Equipment Recommendations for Discharge:  none pt has RW and cane at home SUBJECTIVE:  
Patient stated ? this knee is still hurting, but I will try.? OBJECTIVE DATA SUMMARY:  
Critical Behavior: 
Neurologic State: Alert Orientation Level: Oriented X4 Cognition: Appropriate decision making, Appropriate safety awareness Safety/Judgement: Awareness of environment, Insight into deficits Functional Mobility Training: 
Bed Mobility: 
 Deferred, pt seated in recliner Transfers: 
Sit to Stand: Stand-by assistance(verbal cue to push more through LLE and BUE) Stand to Sit: Stand-by assistance(verbal cue to extend R knee p! to reduce pain) Balance: 
Sitting: Intact; Without support Standing: Intact; With support(dynamic n/a due to R knee p!) Ambulation/Gait Training: 
Distance (ft): 20 Feet (ft)(10ft x 2) Assistive Device: Walker, rolling;Gait belt Ambulation - Level of Assistance: Stand-by assistance(verbal safety cues) Right Side Weight Bearing: As tolerated(decreased due to R knee pain) Left Side Weight Bearing: Full Base of Support: Widened Stance: Left increased Speed/Juliana: Slow Step Length: Left shortened Therapeutic Exercises:  
Long sit: ankle pumps  x 10,reps R quad set x 10 reps, AAROM heel slide(painful, only tolerated 5 reps) Seated:  laq RLE x 5 reps Pain: 
Pain Scale 1: Numeric (0 - 10) Pain Intensity 1: 7 Pain Location 1: Knee Pain Orientation 1: Right Pain Description 1: Throbbing; Shooting Pain Intervention(s) 1: Meditation Activity Tolerance:  
Fair due to R knee pain Please refer to the flowsheet for vital signs taken during this treatment. After treatment:  
?    Patient left in no apparent distress sitting up in chair ? Patient left in no apparent distress in bed 
? Call bell left within reach ? Nursing notified ? Caregiver present ? Bed alarm activated COMMUNICATION/COLLABORATION:  
The patient?s plan of care was discussed with: Occupational Therapist and Registered Nurse Chris Long, PT Time Calculation: 29 mins

## 2019-03-25 NOTE — CDMP QUERY
Pt admitted with ac resp failure and has CHF documented. Please further specify type and acuity of CHF in the medical record. ? Acute on Chronic Diastolic CHF ? Acute on Chronic Systolic and Diastolic CHF ? Acute Diastolic CHF ? Acute Systolic and Diastolic CHF ? Chronic Diastolic CHF ? Chronic Systolic and Diastolic CHF 
? Other, please specify ? Clinically unable to determine The medical record reflects the following: 
  Risk Factors:DM, htn Clinical Indicators: echo--61 - 65%. 24--Acute CHF (congestive heart failure) Treatment: IV lasix, cardiology cx Thanks, Amber Garcia Rn/CDMP

## 2019-03-25 NOTE — PROGRESS NOTES
Hospitalist Progress Note NAME: Elijah Jean-Baptiste :  1935 MRN:  [id number] Assessment / Plan: 
Acute hypoxemic respiratory failure ([facility] [address] (3/23/2019) Hypertensive urgency Acute CHF (congestive heart failure) exacerbation (HCC) (3/23/2019) Slight BNP elevation, Possible CAP  
· ECHO ordered · Cardiology consult · Continue diuretics · HTN management  
started  on Rocephin for PNA, added azithromycin, discussed with Pulmonology  to evaluate for lung nodue 
  
DM (diabetes mellitus) ([facility] [address] (3/23/2019) · Hyperglycemic, continue Lantus and  
· Added SSI  
· Diabetic diet  
  
Hyperlipidemia · Continue statin  
  
Obesity Body mass index is 42.76 kg/m².  
  
Anemia: Hb stable but low · FOBT ordered  
  
 
 
40 or above Morbid obesity / Body mass index is 42.86 kg/m². Code status: Full Prophylaxis: Hep SQ Recommended Disposition: TBD Subjective: Chief Complaint / Reason for Physician Visit \"feeling OK \". Discussed with RN events overnight. Review of Systems: 
Symptom Y/N Comments  Symptom Y/N Comments Fever/Chills    Chest Pain Poor Appetite    Edema Cough    Abdominal Pain Sputum    Joint Pain SOB/NEWMAN    Pruritis/Rash Nausea/vomit    Tolerating PT/OT Diarrhea    Tolerating Diet Constipation    Other Could NOT obtain due to:   
 
Objective: VITALS:  
Last 24hrs VS reviewed since prior progress note. Most recent are: 
Patient Vitals for the past 24 hrs: 
 Temp Pulse Resp BP SpO2  
19 1528 99.3 °F (37.4 °C) 66 20 102/82 100 % 19 1043 97.5 °F (36.4 °C) 64 20 149/50 99 % 19 0826 99.7 °F (37.6 °C) 63 20 168/54 98 % 19 0349 98.6 °F (37 °C) 62 20 135/58 99 % 19 2310 98.3 °F (36.8 °C) 63 20 121/50 96 % 19 1919 98.6 °F (37 °C) 68 20 142/46 97 % Intake/Output Summary (Last 24 hours) at 3/25/2019 1607 Last data filed at 3/25/2019 1300 Gross per 24 hour Intake 680 ml Output 400 ml Net 280 ml PHYSICAL EXAM: 
General: WD, WN. Alert, cooperative, no acute distress   
EENT:  EOMI. Anicteric sclerae. MMM Resp:  CTA bilaterally, no wheezing or rales. No accessory muscle use CV:  Regular  rhythm,  No edema GI:  Soft, Non distended, Non tender.  +Bowel sounds Neurologic:  Alert and oriented X 3, normal speech, Psych:   Good insight. Not anxious nor agitated Skin:  No rashes. No jaundice Reviewed most current lab test results and cultures  YES Reviewed most current radiology test results   YES Review and summation of old records today    NO Reviewed patient's current orders and MAR    YES 
PMH/SH reviewed - no change compared to H&P 
________________________________________________________________________ Care Plan discussed with: 
  Comments Patient Family RN Care Manager Consultant Multidiciplinary team rounds were held today with , nursing, pharmacist and clinical coordinator. Patient's plan of care was discussed; medications were reviewed and discharge planning was addressed. ________________________________________________________________________ Total NON critical care TIME:  35   Minutes Total CRITICAL CARE TIME Spent:   Minutes non procedure based Comments >50% of visit spent in counseling and coordination of care    
________________________________________________________________________ Nuzhat Castro MD  
 
Procedures: see electronic medical records for all procedures/Xrays and details which were not copied into this note but were reviewed prior to creation of Plan. LABS: 
I reviewed today's most current labs and imaging studies. Pertinent labs include: 
Recent Labs  
  03/25/19 
0056 03/24/19 
0415 03/23/19 
5541 WBC 8.2 7.9 11.8* HGB 7.2* 7.7* 7.8* HCT 22.9* 24.5* 25.1*  
 281 261 Recent Labs  
  03/25/19 
0056 03/24/19 
0415 03/23/19 
1065  142 141 K 3.8 3.7 4.3  105 107 CO2 32 32 26 GLU 98 131* 151* BUN 34* 26* 27* CREA 1.40* 1.21* 0.97 CA 8.4* 8.8 8.6 ALB  --   --  3.2* TBILI  --   --  0.4 SGOT  --   --  18 ALT  --   --  24 Signed: Trevor Mittal MD

## 2019-03-25 NOTE — PROGRESS NOTES
Bedside shift change report given to ERIN Wadsworth (oncoming nurse). Report included the following information SBAR, Kardex, Intake/Output, MAR and Recent Results. SHIFT SUMMARY: Unable to obtain morning weight. Patient unable to stand due to right knee pain. CONCERNS TO ADDRESS WITH MD: 
 
 
 
[facility] NURSING NOTE Admission Date 3/23/2019 Admission Diagnosis Acute hypoxemic respiratory failure ([facility] [address] [J96.01] CHF (congestive heart failure) ([facility] [address] [I50.9] DM (diabetes mellitus) ([facility] [address] [E11.9] Consults IP CONSULT TO CARDIOLOGY 
IP CONSULT TO PALLIATIVE CARE - PROVIDER 
IP CONSULT TO PULMONOLOGY Cardiac Monitoring [x] Yes [] No  
  
Purposeful Hourly Rounding [x] Yes   
Estelle Score Total Score: 3 Estelle score 3 or > [x] Bed Alarm [] Avasys [] 1:1 sitter [] Patient refused (Signed refusal form in chart) Ayaan Score Ayaan Score: 18 Ayaan score 14 or < [] PMT consult [] Wound Care consult  
 []  Specialty bed  [] Nutrition consult Influenza Vaccine Received Flu Vaccine for Current Season (usually Sept-March): No  
 Patient/Guardian Refused (Notify MD): No  
  
Oxygen needs? [x] Room air Oxygen @  []1L    []2L    []3L   []4L    []5L   []6L via NC Chronic home O2 use? [] Yes [x] No 
Perform O2 challenge test and document in progress note using smartphrase (.Homeoxygen) Last bowel movement Last Bowel Movement Date: 03/24/19 Urinary Catheter LDAs Peripheral IV 03/25/19 Left Antecubital (Active) Site Assessment Clean, dry, & intact 3/25/2019  7:48 PM  
Phlebitis Assessment 0 3/25/2019  7:48 PM  
Infiltration Assessment 0 3/25/2019  7:48 PM  
Dressing Status Clean, dry, & intact 3/25/2019  7:48 PM  
Dressing Type Transparent 3/25/2019  7:48 PM  
Hub Color/Line Status Capped 3/25/2019  7:48 PM  
                  
  
Readmission Risk Assessment Tool Score High Risk   
      
 24 Total Score 3 Has Seen PCP in Last 6 Months (Yes=3, No=0) 5 Pt. Coverage (Medicare=5 , Medicaid, or Self-Pay=4) 16 Charlson Comorbidity Score (Age + Comorbid Conditions) Criteria that do not apply:  
 . Living with Significant Other. Assisted Living. LTAC. SNF. or  
Rehab Patient Length of Stay (>5 days = 3) IP Visits Last 12 Months (1-3=4, 4=9, >4=11) Expected Length of Stay 4d 2h Actual Length of Stay 2

## 2019-03-26 ENCOUNTER — APPOINTMENT (OUTPATIENT)
Dept: ULTRASOUND IMAGING | Age: 84
DRG: 291 | End: 2019-03-26
Attending: NURSE PRACTITIONER
Payer: MEDICARE

## 2019-03-26 ENCOUNTER — APPOINTMENT (OUTPATIENT)
Dept: GENERAL RADIOLOGY | Age: 84
DRG: 291 | End: 2019-03-26
Attending: INTERNAL MEDICINE
Payer: MEDICARE

## 2019-03-26 LAB
ANION GAP SERPL CALC-SCNC: 7 MMOL/L (ref 5–15)
APPEARANCE SNV: ABNORMAL
BASOPHILS # BLD: 0 K/UL (ref 0–0.1)
BASOPHILS NFR BLD: 0 % (ref 0–1)
BODY FLD TYPE: NORMAL
BUN SERPL-MCNC: 37 MG/DL (ref 6–20)
BUN/CREAT SERPL: 21 (ref 12–20)
CALCIUM SERPL-MCNC: 8.1 MG/DL (ref 8.5–10.1)
CHLORIDE SERPL-SCNC: 104 MMOL/L (ref 97–108)
CO2 SERPL-SCNC: 29 MMOL/L (ref 21–32)
COLOR SNV: YELLOW
CREAT SERPL-MCNC: 1.76 MG/DL (ref 0.55–1.02)
CRYSTALS FLD MICRO: NORMAL
DIFFERENTIAL METHOD BLD: ABNORMAL
EOSINOPHIL # BLD: 0 K/UL (ref 0–0.4)
EOSINOPHIL NFR BLD: 0 % (ref 0–7)
ERYTHROCYTE [DISTWIDTH] IN BLOOD BY AUTOMATED COUNT: 23.7 % (ref 11.5–14.5)
GLUCOSE BLD STRIP.AUTO-MCNC: 124 MG/DL (ref 65–100)
GLUCOSE BLD STRIP.AUTO-MCNC: 203 MG/DL (ref 65–100)
GLUCOSE BLD STRIP.AUTO-MCNC: 228 MG/DL (ref 65–100)
GLUCOSE BLD STRIP.AUTO-MCNC: 274 MG/DL (ref 65–100)
GLUCOSE SERPL-MCNC: 137 MG/DL (ref 65–100)
HCT VFR BLD AUTO: 23.1 % (ref 35–47)
HGB BLD-MCNC: 7.2 G/DL (ref 11.5–16)
IMM GRANULOCYTES # BLD AUTO: 0 K/UL (ref 0–0.04)
IMM GRANULOCYTES NFR BLD AUTO: 0 % (ref 0–0.5)
LYMPHOCYTES # BLD: 1.6 K/UL (ref 0.8–3.5)
LYMPHOCYTES NFR BLD: 18 % (ref 12–49)
LYMPHOCYTES NFR SNV MANUAL: 2 % (ref 0–74)
MCH RBC QN AUTO: 20.2 PG (ref 26–34)
MCHC RBC AUTO-ENTMCNC: 31.2 G/DL (ref 30–36.5)
MCV RBC AUTO: 64.9 FL (ref 80–99)
MONOCYTES # BLD: 1.2 K/UL (ref 0–1)
MONOCYTES NFR BLD: 13 % (ref 5–13)
MONOCYTES NFR SNV MANUAL: 5 % (ref 0–69)
NEUTROPHILS NFR SNV MANUAL: 93 % (ref 0–24)
NEUTS SEG # BLD: 6.1 K/UL (ref 1.8–8)
NEUTS SEG NFR BLD: 69 % (ref 32–75)
NRBC # BLD: 0 K/UL (ref 0–0.01)
NRBC BLD-RTO: 0 PER 100 WBC
PLATELET # BLD AUTO: 265 K/UL (ref 150–400)
PMV BLD AUTO: ABNORMAL FL (ref 8.9–12.9)
POTASSIUM SERPL-SCNC: 4 MMOL/L (ref 3.5–5.1)
RBC # BLD AUTO: 3.56 M/UL (ref 3.8–5.2)
RBC # SNV: >100 /CU MM
RBC MORPH BLD: ABNORMAL
SERVICE CMNT-IMP: ABNORMAL
SODIUM SERPL-SCNC: 140 MMOL/L (ref 136–145)
SPECIMEN SOURCE FLD: ABNORMAL
WBC # BLD AUTO: 8.9 K/UL (ref 3.6–11)
WBC # SNV: ABNORMAL /CU MM (ref 0–150)

## 2019-03-26 PROCEDURE — 93970 EXTREMITY STUDY: CPT

## 2019-03-26 PROCEDURE — 74011250637 HC RX REV CODE- 250/637: Performed by: INTERNAL MEDICINE

## 2019-03-26 PROCEDURE — 77030038269 HC DRN EXT URIN PURWCK BARD -A

## 2019-03-26 PROCEDURE — 36415 COLL VENOUS BLD VENIPUNCTURE: CPT

## 2019-03-26 PROCEDURE — 74011250637 HC RX REV CODE- 250/637: Performed by: FAMILY MEDICINE

## 2019-03-26 PROCEDURE — 80048 BASIC METABOLIC PNL TOTAL CA: CPT

## 2019-03-26 PROCEDURE — 85025 COMPLETE CBC W/AUTO DIFF WBC: CPT

## 2019-03-26 PROCEDURE — 74011636637 HC RX REV CODE- 636/637: Performed by: INTERNAL MEDICINE

## 2019-03-26 PROCEDURE — 97116 GAIT TRAINING THERAPY: CPT

## 2019-03-26 PROCEDURE — 73562 X-RAY EXAM OF KNEE 3: CPT

## 2019-03-26 PROCEDURE — 87205 SMEAR GRAM STAIN: CPT

## 2019-03-26 PROCEDURE — 74011250636 HC RX REV CODE- 250/636: Performed by: FAMILY MEDICINE

## 2019-03-26 PROCEDURE — 97530 THERAPEUTIC ACTIVITIES: CPT

## 2019-03-26 PROCEDURE — 74011250636 HC RX REV CODE- 250/636: Performed by: INTERNAL MEDICINE

## 2019-03-26 PROCEDURE — 89050 BODY FLUID CELL COUNT: CPT

## 2019-03-26 PROCEDURE — 77030032490 HC SLV COMPR SCD KNE COVD -B

## 2019-03-26 PROCEDURE — 74011000258 HC RX REV CODE- 258: Performed by: INTERNAL MEDICINE

## 2019-03-26 PROCEDURE — 65660000000 HC RM CCU STEPDOWN

## 2019-03-26 PROCEDURE — 94760 N-INVAS EAR/PLS OXIMETRY 1: CPT

## 2019-03-26 PROCEDURE — 74011250637 HC RX REV CODE- 250/637: Performed by: NURSE PRACTITIONER

## 2019-03-26 PROCEDURE — 74011250636 HC RX REV CODE- 250/636: Performed by: PHYSICIAN ASSISTANT

## 2019-03-26 PROCEDURE — 0S9C3ZX DRAINAGE OF RIGHT KNEE JOINT, PERCUTANEOUS APPROACH, DIAGNOSTIC: ICD-10-PCS | Performed by: PHYSICIAN ASSISTANT

## 2019-03-26 PROCEDURE — 82962 GLUCOSE BLOOD TEST: CPT

## 2019-03-26 PROCEDURE — 89060 EXAM SYNOVIAL FLUID CRYSTALS: CPT

## 2019-03-26 PROCEDURE — 20610 DRAIN/INJ JOINT/BURSA W/O US: CPT

## 2019-03-26 RX ORDER — IPRATROPIUM BROMIDE AND ALBUTEROL SULFATE 2.5; .5 MG/3ML; MG/3ML
3 SOLUTION RESPIRATORY (INHALATION)
Status: DISCONTINUED | OUTPATIENT
Start: 2019-03-26 | End: 2019-03-28 | Stop reason: HOSPADM

## 2019-03-26 RX ORDER — LANOLIN ALCOHOL/MO/W.PET/CERES
1 CREAM (GRAM) TOPICAL
Status: DISCONTINUED | OUTPATIENT
Start: 2019-03-27 | End: 2019-03-28 | Stop reason: HOSPADM

## 2019-03-26 RX ORDER — POLYETHYLENE GLYCOL 3350 17 G/17G
17 POWDER, FOR SOLUTION ORAL DAILY
Status: DISCONTINUED | OUTPATIENT
Start: 2019-03-27 | End: 2019-03-28 | Stop reason: HOSPADM

## 2019-03-26 RX ORDER — AMLODIPINE BESYLATE 5 MG/1
10 TABLET ORAL DAILY
Status: DISCONTINUED | OUTPATIENT
Start: 2019-03-26 | End: 2019-03-28 | Stop reason: HOSPADM

## 2019-03-26 RX ORDER — TRAMADOL HYDROCHLORIDE 50 MG/1
50 TABLET ORAL
Status: DISCONTINUED | OUTPATIENT
Start: 2019-03-26 | End: 2019-03-28 | Stop reason: HOSPADM

## 2019-03-26 RX ORDER — LIDOCAINE HYDROCHLORIDE 10 MG/ML
10 INJECTION, SOLUTION EPIDURAL; INFILTRATION; INTRACAUDAL; PERINEURAL ONCE
Status: COMPLETED | OUTPATIENT
Start: 2019-03-26 | End: 2019-03-26

## 2019-03-26 RX ADMIN — ASPIRIN 81 MG: 81 TABLET, COATED ORAL at 09:23

## 2019-03-26 RX ADMIN — CARVEDILOL 25 MG: 12.5 TABLET, FILM COATED ORAL at 18:18

## 2019-03-26 RX ADMIN — INSULIN LISPRO 3 UNITS: 100 INJECTION, SOLUTION INTRAVENOUS; SUBCUTANEOUS at 18:18

## 2019-03-26 RX ADMIN — Medication 10 ML: at 15:01

## 2019-03-26 RX ADMIN — HEPARIN SODIUM 7500 UNITS: 5000 INJECTION INTRAVENOUS; SUBCUTANEOUS at 09:22

## 2019-03-26 RX ADMIN — LEVOTHYROXINE SODIUM 175 MCG: 150 TABLET ORAL at 05:17

## 2019-03-26 RX ADMIN — PREGABALIN 150 MG: 75 CAPSULE ORAL at 09:23

## 2019-03-26 RX ADMIN — HEPARIN SODIUM 7500 UNITS: 5000 INJECTION INTRAVENOUS; SUBCUTANEOUS at 01:42

## 2019-03-26 RX ADMIN — ATORVASTATIN CALCIUM 20 MG: 20 TABLET, FILM COATED ORAL at 09:23

## 2019-03-26 RX ADMIN — INSULIN GLARGINE 25 UNITS: 100 INJECTION, SOLUTION SUBCUTANEOUS at 09:25

## 2019-03-26 RX ADMIN — INSULIN LISPRO 3 UNITS: 100 INJECTION, SOLUTION INTRAVENOUS; SUBCUTANEOUS at 21:22

## 2019-03-26 RX ADMIN — CEFTRIAXONE 1 G: 1 INJECTION, POWDER, FOR SOLUTION INTRAMUSCULAR; INTRAVENOUS at 09:22

## 2019-03-26 RX ADMIN — Medication 10 ML: at 21:22

## 2019-03-26 RX ADMIN — INSULIN LISPRO 3 UNITS: 100 INJECTION, SOLUTION INTRAVENOUS; SUBCUTANEOUS at 11:57

## 2019-03-26 RX ADMIN — CARVEDILOL 25 MG: 12.5 TABLET, FILM COATED ORAL at 10:47

## 2019-03-26 RX ADMIN — INSULIN GLARGINE 25 UNITS: 100 INJECTION, SOLUTION SUBCUTANEOUS at 21:22

## 2019-03-26 RX ADMIN — ACETAMINOPHEN 500 MG: 500 TABLET ORAL at 10:46

## 2019-03-26 RX ADMIN — AZITHROMYCIN 250 MG: 250 TABLET, FILM COATED ORAL at 09:23

## 2019-03-26 RX ADMIN — LIDOCAINE HYDROCHLORIDE 10 ML: 10 INJECTION, SOLUTION EPIDURAL; INFILTRATION; INTRACAUDAL; PERINEURAL at 18:18

## 2019-03-26 RX ADMIN — ACETAMINOPHEN 500 MG: 500 TABLET ORAL at 05:22

## 2019-03-26 RX ADMIN — AMLODIPINE BESYLATE 10 MG: 5 TABLET ORAL at 09:23

## 2019-03-26 RX ADMIN — TRAMADOL HYDROCHLORIDE 50 MG: 50 TABLET, FILM COATED ORAL at 18:19

## 2019-03-26 RX ADMIN — PREGABALIN 150 MG: 75 CAPSULE ORAL at 18:18

## 2019-03-26 RX ADMIN — Medication 10 ML: at 01:43

## 2019-03-26 RX ADMIN — ACETAMINOPHEN 500 MG: 500 TABLET ORAL at 23:42

## 2019-03-26 RX ADMIN — POTASSIUM CHLORIDE 10 MEQ: 750 TABLET, EXTENDED RELEASE ORAL at 09:23

## 2019-03-26 RX ADMIN — ACETAMINOPHEN 500 MG: 500 TABLET ORAL at 15:35

## 2019-03-26 RX ADMIN — Medication 1 CAPSULE: at 09:23

## 2019-03-26 NOTE — PROGRESS NOTES
Hospitalist Progress Note NAME: Layla Diaz :  1935 MRN:  [id number] Assessment / Plan: 
Acute hypoxemic respiratory failure ([facility] (3/23/2019) CAP Lung nodule in right mid lung - CT of chest: Nodule 1.7 x 1.3 cm nodule right lower lobe along the oblique fissure with 
bilateral airspace disease most confluent at the right lung base and septal lobular thickening and right pleural effusion. No comment on presence of PE 
- appreciate pulmonology input; continue with Ceftriaxone and zithromax RA O2 Sat 99% Repeat CT in 4-6 weeks per pulmology team; Lesion not amenable to bronchoscopy or needle aspirate; tough location- explained to pt and she understands Lower extremity pain 
- duplex study to r/o DVT Hypertensive urgency Acute CHF (congestive heart failure) exacerbation ([facility] (3/23/2019)  HTN Hx of CAD Dyslipidemia 
- continue with ASA, Coreg, and amlodipine (dose increased in am). ACEi on hold due to worsening of renal function 
- will on lasix today due to ZACK; daily weight 113.3kg 
  fluid restriction 1200ml HCTZ changed to lasix per cardiology 3/25. 
- minimal elevation of NT pro- 
- Echo:EF 60%, mild MR, mod pHTN  
- continue with statin 
  
DM (diabetes mellitus) ([facility] (3/23/2019) - A1C 6.3. Continue with diabetic diet - check qac and qhs blood glucose and follow SSI Anemia of unknown disease 
- heme (-) stool. Will send anemia work up in am 
 
ZACK on CKD 
- creat 1.76 (was 0.9). Avoid nephrotoxic agents. Will consider consulting nephrology if gets worse Most likely secondary to diruesing Constipation 
- start on daily miralax Hypothyroidism 
- continue with levothyroxine Obesity Body mass index is 42.76 kg/m². Code status: Full Prophylaxis: Hep SQ Recommended Disposition: ?[facility] Subjective: Chief Complaint / Reason for Physician Visit \"my legs hurt \". Discussed with RN events overnight. Review of Systems: Symptom Y/N Comments  Symptom Y/N Comments Fever/Chills n   Chest Pain n   
Poor Appetite    Edema y Cough    Abdominal Pain Sputum    Joint Pain SOB/NEWMAN n   Pruritis/Rash Nausea/vomit n   Tolerating PT/OT Diarrhea    Tolerating Diet Constipation y mild  Other Could NOT obtain due to:   
 
Objective: VITALS:  
Last 24hrs VS reviewed since prior progress note. Most recent are: 
Patient Vitals for the past 24 hrs: 
 Temp Pulse Resp BP SpO2  
03/26/19 0738 99.4 °F (37.4 °C) 67 20 106/47 99 % 03/26/19 0312 99.7 °F (37.6 °C) 68 20 135/62 97 % 03/25/19 2317 100.2 °F (37.9 °C) 71 20 128/45 98 % 03/25/19 1934 98 °F (36.7 °C) 74 20 138/55 96 % 03/25/19 1528 99.3 °F (37.4 °C) 66 20 102/82 100 % 03/25/19 1043 97.5 °F (36.4 °C) 64 20 149/50 99 % 03/25/19 0826 99.7 °F (37.6 °C) 63 20 168/54 98 % Intake/Output Summary (Last 24 hours) at 3/26/2019 2295 Last data filed at 3/26/2019 3035 Gross per 24 hour Intake 780 ml Output 550 ml Net 230 ml PHYSICAL EXAM: 
General: WD, WN. Alert, cooperative, no acute distress   
EENT:  EOMI. Anicteric sclerae. MMM Resp:  CTA bilaterally, no wheezing or rales. No accessory muscle use CV:  Regular  rhythm,  trace of pitting edema GI:  Soft, obese, Non tender.  +Bowel sounds Neurologic:  Alert and oriented X 3, normal speech, Psych:   Good insight. Not anxious nor agitated Skin:  No rashes. No jaundice Reviewed most current lab test results and cultures  YES Reviewed most current radiology test results   YES Review and summation of old records today    NO Reviewed patient's current orders and MAR    YES 
PMH/SH reviewed - no change compared to H&P 
________________________________________________________________________ Care Plan discussed with: 
  Comments Patient y Family RN y   
Care Manager y Consultant     
                 y Multidiciplinary team rounds were held today with case manager, nursing, pharmacist and clinical coordinator. Patient's plan of care was discussed; medications were reviewed and discharge planning was addressed. ________________________________________________________________________ Total NON critical care TIME:  30  Minutes Total CRITICAL CARE TIME Spent:   Minutes non procedure based Comments >50% of visit spent in counseling and coordination of care    
________________________________________________________________________ Laura Nunez NP Procedures: see electronic medical records for all procedures/Xrays and details which were not copied into this note but were reviewed prior to creation of Plan. LABS: 
I reviewed today's most current labs and imaging studies. Pertinent labs include: 
Recent Labs  
  03/26/19 
0145 03/25/19 
0056 03/24/19 
0415 WBC 8.9 8.2 7.9 HGB 7.2* 7.2* 7.7* HCT 23.1* 22.9* 24.5*  
 272 281 Recent Labs  
  03/26/19 
0145 03/25/19 
0056 03/24/19 
0218  142 142  
K 4.0 3.8 3.7  104 105 CO2 29 32 32 * 98 131* BUN 37* 34* 26* CREA 1.76* 1.40* 1.21* CA 8.1* 8.4* 8.8 Signed: Valentino Bland NP

## 2019-03-26 NOTE — PROGRESS NOTES
Problem: Mobility Impaired (Adult and Pediatric) Goal: *Acute Goals and Plan of Care (Insert Text) Description Physical Therapy Goals Initiated 3/24/2019 1. Patient will move from supine to sit and sit to supine  in bed with independence within 7 day(s). 2.  Patient will transfer from bed to chair and chair to bed with independence using the least restrictive device within 7 day(s). 3.  Patient will perform sit to stand with independence within 7 day(s). 4.  Patient will ambulate with independence for 150 feet with the least restrictive device within 7 day(s) with o2 sats in mid 90s. 5.  Patient will ascend/descend 3 stairs with 1 handrail(s) with min A/CGA within 7 day(s). Note: PHYSICAL THERAPY TREATMENT Patient: Catherine Murphy (80 y.o. female) Date: 3/26/2019 Diagnosis: Acute hypoxemic respiratory failure ([facility] [address] [J96.01] CHF (congestive heart failure) ([facility] [address] [I50.9] DM (diabetes mellitus) ([facility] [address] [E11.9] Precautions:  falls Chart, physical therapy assessment, plan of care and goals were reviewed. ASSESSMENT: pt's mobility very limited today 2/2 to Rt knee pain, did fair with transfers and bed mob, no LOB or SOB, vc's for safety and proper RW use. Progression toward goals: 
?    Improving appropriately and progressing toward goals ? Improving very slowly and progressing toward goals ? Not making progress toward goals and plan of care will be adjusted PLAN: 
Patient continues to benefit from skilled intervention to address the above impairments. Continue treatment per established plan of care. Discharge Recommendations:  Home Health Further Equipment Recommendations for Discharge:  has straight cane and rolling walker OBJECTIVE DATA SUMMARY:  
Critical Behavior: 
Neurologic State: Alert Orientation Level: Appropriate for age, Oriented X4 Cognition: Appropriate decision making, Appropriate safety awareness, Appropriate for age attention/concentration, Follows commands Safety/Judgement: Awareness of environment, Insight into deficits Functional Mobility Training: 
Bed Mobility: 
Sit to Supine: Minimum assistance;Assist x2 Scooting: Minimum assistance;Assist x1;Additional time Interventions: Tactile cues; Verbal cues Transfers: 
Sit to Stand: Minimum assistance;Assist x2; Additional time Stand to Sit: Contact guard assistance;Assist x2 Bed to Chair: Minimum assistance;Assist x2; Additional time Interventions: Tactile cues; Verbal cues Level of Assistance: Minimum assistance Balance: 
Sitting: Intact; Without support Standing: Intact; With support Ambulation/Gait Training: 
Distance (ft): 12 Feet (ft) Assistive Device: Walker, rolling;Gait belt Ambulation - Level of Assistance: Minimal assistance;Assist x2 Gait Abnormalities: Antalgic;Decreased step clearance; Step to gait Right Side Weight Bearing: As tolerated Left Side Weight Bearing: Full Base of Support: Widened Stance: Right decreased Speed/Juliana: Slow Step Length: Left shortened;Right shortened Interventions: Verbal cues; Tactile cues Pain: 
Pain Scale 1: Numeric (0 - 10) Pain Intensity 1: 10 
Pain Location 1: Knee Pain Orientation 1: Right Pain Description 1: Sore;Aching Pain Intervention(s) 1: Nurse notified Activity Tolerance: poor After treatment:  
?    Patient left in no apparent distress sitting up in chair ? Patient left in no apparent distress in bed 
? Call bell left within reach ? Nursing notified ? Caregiver present ? Bed alarm activated COMMUNICATION/COLLABORATION:  
The patient?s plan of care was discussed with: Registered Nurse Rhiannon Callaway PTA Time Calculation: 25 mins

## 2019-03-26 NOTE — PROGRESS NOTES
0700: Bedside shift change report given to SHYLA Ballesteros RN (oncoming nurse) by Patel López RN (offgoing nurse). Report included the following information SBAR and Kardex. 1900: Bedside shift change report given to Patel López RN (oncoming nurse) by SHYLA Ballesteros RN (offgoing nurse). Report included the following information SBAR and Kardex.

## 2019-03-26 NOTE — CONSULTS
ORTHOPAEDIC CONSULT NOTE    Subjective:     Date of Consultation:  March 26, 2019      Johanna Trevizo is a 80 y.o. female who is being seen for right knee pain. Was admitted on 3/23/2019 from home via EMS w/ worsening SOB, history of DM, CAD w/ hx of MI.     Ambulates at baseline--cane/walker  6 weeks of issues- seen at [facility] ER and [facility] ER 2/9- . Right knee aspirated - DX with gout and had staph grow in the thio broth   Pt denies F/C./flu like symptoms. Patient Active Problem List    Diagnosis Date Noted    HTN (hypertension) 03/24/2019    Hyperlipidemia 03/24/2019    Chronic renal insufficiency 03/24/2019    Anemia 03/24/2019    CHF (congestive heart failure) ([facility] [address] 03/23/2019    DM (diabetes mellitus) ([facility] [address] 03/23/2019    Acute hypoxemic respiratory failure ([facility] [address] 03/23/2019    Malignant hypertension 08/09/2016     Family History   Problem Relation Age of Onset    Cancer Mother     Cancer Father     Cancer Sister     Heart Disease Sister     Diabetes Other         multiple family members      Social History     Tobacco Use    Smoking status: Never Smoker    Smokeless tobacco: Never Used   Substance Use Topics    Alcohol use: No     Past Medical History:   Diagnosis Date    Diabetes ([facility] [address] [facility]     Heart attack ([facility] [address] 2009    Hyperlipidemia     Hypertension       Past Surgical History:   Procedure Laterality Date    HX THYROIDECTOMY  2005      Prior to Admission medications    Medication Sig Start Date End Date Taking? Authorizing Provider   amLODIPine (NORVASC) 5 mg tablet Take 2.5 mg by mouth daily. Yes Provider, Historical   pregabalin (LYRICA) 150 mg capsule Take  by mouth two (2) times a day. Indications: Diabetic Complication causing Injury to some Body Nerves   Yes Provider, Historical   insulin glargine (LANTUS SOLOSTAR U-100 INSULIN) 100 unit/mL (3 mL) inpn 35 Units by SubCUTAneous route two (2) times a day.    Yes Provider, Historical   carvedilol (COREG) 25 mg tablet Take 25 mg by mouth two (2) times daily (with meals). Jonah Jimenez MD   lisinopril (PRINIVIL, ZESTRIL) 40 mg tablet Take 40 mg by mouth daily. Jonah Jimenez MD   hydroCHLOROthiazide (HYDRODIURIL) 25 mg tablet Take 25 mg by mouth daily. Jonah Jimenez MD   diclofenac (VOLTAREN) 1 % gel Apply 2 g to affected area four (4) times daily. 2/10/19   Phillip Pozo DO   oxyCODONE IR (ROXICODONE) 5 mg immediate release tablet 1-2 tabs every 4-6 hours as needed for pain 2/10/19   Phillip Pozo DO   atorvastatin (LIPITOR) 20 mg tablet Take 20 mg by mouth daily. Jonah Jimenez MD   levothyroxine (SYNTHROID) 175 mcg tablet Take 175 mcg by mouth Daily (before breakfast). Jonah Jimenez MD   aspirin delayed-release 81 mg tablet Take 81 mg by mouth daily.     Jonah Jimenez MD     Current Facility-Administered Medications   Medication Dose Route Frequency    amLODIPine (NORVASC) tablet 10 mg  10 mg Oral DAILY    lactobac ac& pc-s.therm-b.anim (BRIGHT Q/RISAQUAD)  1 Cap Oral DAILY    albuterol-ipratropium (DUO-NEB) 2.5 MG-0.5 MG/3 ML  3 mL Nebulization Q6H PRN    nitroglycerin (NITROBID) 2 % ointment 1 Inch  1 Inch Topical TID PRN    [START ON 3/27/2019] ferrous sulfate tablet 325 mg  1 Tab Oral DAILY WITH BREAKFAST    traMADol (ULTRAM) tablet 50 mg  50 mg Oral Q6H PRN    [START ON 3/27/2019] polyethylene glycol (MIRALAX) packet 17 g  17 g Oral DAILY    azithromycin (ZITHROMAX) tablet 250 mg  250 mg Oral DAILY    acetaminophen (TYLENOL) tablet 500 mg  500 mg Oral Q4H PRN    cefTRIAXone (ROCEPHIN) 1 g in 0.9% sodium chloride (MBP/ADV) 50 mL  1 g IntraVENous Q24H    potassium chloride SR (KLOR-CON 10) tablet 10 mEq  10 mEq Oral DAILY    pregabalin (LYRICA) capsule 150 mg  150 mg Oral BID    insulin lispro (HUMALOG) injection   SubCUTAneous AC&HS    atorvastatin (LIPITOR) tablet 20 mg  20 mg Oral DAILY    levothyroxine (SYNTHROID) tablet 175 mcg  175 mcg Oral 6am    aspirin delayed-release tablet 81 mg  81 mg Oral DAILY    carvedilol (COREG) tablet 25 mg  25 mg Oral BID WITH MEALS    docusate sodium (COLACE) capsule 100 mg  100 mg Oral PRN    glucose chewable tablet 16 g  4 Tab Oral PRN    dextrose (D50W) injection syrg 12.5-25 g  25-50 mL IntraVENous PRN    glucagon (GLUCAGEN) injection 1 mg  1 mg IntraMUSCular PRN    sodium chloride (NS) flush 5-40 mL  5-40 mL IntraVENous Q8H    sodium chloride (NS) flush 5-40 mL  5-40 mL IntraVENous PRN    hydrALAZINE (APRESOLINE) 20 mg/mL injection 10 mg  10 mg IntraVENous Q6H PRN    insulin glargine (LANTUS) injection 25 Units  25 Units SubCUTAneous BID      Allergies   Allergen Reactions    Trazodone Anxiety        Review of Systems:  A comprehensive review of systems was negative except for that written in the HPI. Mental Status: no dementia    Objective:     Patient Vitals for the past 8 hrs:   BP Temp Pulse Resp SpO2   19 1547 121/57 98.7 °F (37.1 °C) 73 18 98 %   19 1104 126/44 98.7 °F (37.1 °C) 68 20 100 %     Temp (24hrs), Av.1 °F (37.3 °C), Min:98 °F (36.7 °C), Max:100.2 °F (37.9 °C)      Gen: Well-developed,  in no acute distress, obese  HEENT: Pink conjunctivae, hearing intact to voice, moist mucous membranes   Neck: Supple  Resp: No respiratory distress   Card: RRR, palpable distal pulse-equal bilaterally, birsk cap refill all distal digits   Abd: non-distended  Musc: right knee with + effusion, moderate +TTP , intact ext mech, stable collaterals   Skin: No skin breakdown noted. Skin warm, pink, dry  Neuro: Cranial nerves are grossly intact, no focal motor weakness, follows commands appropriately   Psych: Good insight, oriented to person, place and time, alert    Imaging Review: XR KNEE RT 3 V   INDICATION: R knee pain and swelling.  COMPARISON: 2/10/2019.   FINDINGS: Three views of the right knee demonstrate no fracture. There is  tricompartmental erosive arthritis involving the patellofemoral compartment to a  greater degree than the medial compartment.  A small joint effusion is present. Faint chondrocalcinosis is present. IMPRESSION: Tricompartmental erosive arthritis, most pronounced in the  patellofemoral compartment, most suggestive of CPPD arthropathy. Labs:   Recent Results (from the past 24 hour(s))   GLUCOSE, POC    Collection Time: 03/25/19  8:57 PM   Result Value Ref Range    Glucose (POC) 323 (H) 65 - 100 mg/dL    Performed by Unkown     METABOLIC PANEL, BASIC    Collection Time: 03/26/19  1:45 AM   Result Value Ref Range    Sodium 140 136 - 145 mmol/L    Potassium 4.0 3.5 - 5.1 mmol/L    Chloride 104 97 - 108 mmol/L    CO2 29 21 - 32 mmol/L    Anion gap 7 5 - 15 mmol/L    Glucose 137 (H) 65 - 100 mg/dL    BUN 37 (H) 6 - 20 MG/DL    Creatinine 1.76 (H) 0.55 - 1.02 MG/DL    BUN/Creatinine ratio 21 (H) 12 - 20      GFR est AA 33 (L) >60 ml/min/1.73m2    GFR est non-AA 28 (L) >60 ml/min/1.73m2    Calcium 8.1 (L) 8.5 - 10.1 MG/DL   CBC WITH AUTOMATED DIFF    Collection Time: 03/26/19  1:45 AM   Result Value Ref Range    WBC 8.9 3.6 - 11.0 K/uL    RBC 3.56 (L) 3.80 - 5.20 M/uL    HGB 7.2 (L) 11.5 - 16.0 g/dL    HCT 23.1 (L) 35.0 - 47.0 %    MCV 64.9 (L) 80.0 - 99.0 FL    MCH 20.2 (L) 26.0 - 34.0 PG    MCHC 31.2 30.0 - 36.5 g/dL    RDW 23.7 (H) 11.5 - 14.5 %    PLATELET 277 461 - 923 K/uL    MPV ABNORMAL 8.9 - 12.9 FL    NRBC 0.0 0  WBC    ABSOLUTE NRBC 0.00 0.00 - 0.01 K/uL    NEUTROPHILS 69 32 - 75 %    LYMPHOCYTES 18 12 - 49 %    MONOCYTES 13 5 - 13 %    EOSINOPHILS 0 0 - 7 %    BASOPHILS 0 0 - 1 %    IMMATURE GRANULOCYTES 0 0.0 - 0.5 %    ABS. NEUTROPHILS 6.1 1.8 - 8.0 K/UL    ABS. LYMPHOCYTES 1.6 0.8 - 3.5 K/UL    ABS. MONOCYTES 1.2 (H) 0.0 - 1.0 K/UL    ABS. EOSINOPHILS 0.0 0.0 - 0.4 K/UL    ABS. BASOPHILS 0.0 0.0 - 0.1 K/UL    ABS. IMM.  GRANS. 0.0 0.00 - 0.04 K/UL    DF AUTOMATED      RBC COMMENTS ANISOCYTOSIS  3+        RBC COMMENTS HYPOCHROMIA  2+        RBC COMMENTS MICROCYTOSIS  2+        RBC COMMENTS TARGET CELLS  PRESENT GLUCOSE, POC    Collection Time: 03/26/19  7:36 AM   Result Value Ref Range    Glucose (POC) 124 (H) 65 - 100 mg/dL    Performed by Dioni Linder, POC    Collection Time: 03/26/19 11:01 AM   Result Value Ref Range    Glucose (POC) 203 (H) 65 - 100 mg/dL    Performed by Chaka Bailey          Impression:     Patient Active Problem List    Diagnosis Date Noted    HTN (hypertension) 03/24/2019    Hyperlipidemia 03/24/2019    Chronic renal insufficiency 03/24/2019    Anemia 03/24/2019    CHF (congestive heart failure) ([address] 03/23/2019    DM (diabetes mellitus) ([address] 03/23/2019    Acute hypoxemic respiratory failure ([address] 03/23/2019    Malignant hypertension 08/09/2016     Principal Problem:    Acute hypoxemic respiratory failure ([address] (3/23/2019)    Active Problems:    CHF (congestive heart failure) ([address] (3/23/2019)      DM (diabetes mellitus) ([address] (3/23/2019)      HTN (hypertension) (3/24/2019)      Hyperlipidemia (3/24/2019)      Chronic renal insufficiency (3/24/2019)      Anemia (3/24/2019)        Plan:     Right knee end stage arthritis likely gout  Neg doppler US    Right knee joint aspiration and injection; procedure described to pt and family, risk and benefits reviewed. Consent signed by pt. Under sterile condition 3cc lidocaine injected into sub-q tissues right knee, via superior lateral patellar approach  Joint aspirated with 18g on a 60cc syringe. 35cc fluid removed, cell count, crystals, cultured ordered. Band-aid applied  There were no complication pt tolerated it well  Vitals stable before and after procedure    English. ...., RN present and assisted throughout procedure    Will follow aspirate- consider injection Wed depending on results    Dr. Madeline Antunez aware and agrees with plan as above.         Jennifer Velasquez PA-C  [employer]

## 2019-03-26 NOTE — PROGRESS NOTES
[address] INTERDISCIPLINARY ROUNDS Cardiopulmonary Care Interdisciplinary Rounds were held today to discuss patient's plan of care and outcomes. The following members were present: NP/Physician, Pharmacy, Nursing and Case Management. Expected Length of Stay:  4d 2h 
 
PLAN OF CARE:  
Continue current treatment plan

## 2019-03-26 NOTE — PROGRESS NOTES
Attempted to see Pt yet RN verbalized Pt is currently being transferred for ultrasound of R knee d/t chronic c/o pain that has not resolved.  
 
[facility], OTR/L

## 2019-03-26 NOTE — PROGRESS NOTES
CM met with pt and discussed discharge plan and possible discharge for tomorrow. Pt's family will transport pt home by car. Medicare pt has received, reviewed, and signed 2nd IM letter informing them of their right to appeal the discharge. Signed copied has been placed on pt bedside chart. Sebastien Elias, [phone]

## 2019-03-26 NOTE — PROGRESS NOTES
[facility] [phone] ([phone]) [facility]: [phone] ([phone]) Code Status: Full Code ADDENDUM 2:30 PM 
 
Woke patient from nap with knock on door, she requested a later visit. 12 PM 
Patient / Family Encounter Documentation Participants (names): Patient, Palliative Medicine (Irais Bonilla) Narrative:  
Per chart: 
Misti Pisano is a 80 y.o. with a past history of DM, CAD w/ MI in 2004 who was admitted on 3/23/2019 from home via EMS w/ worsening SOB. On BIPAP and received Lasix in ED. Echo w/ EF 60%, mild MR, mod pulm HTN. Cardiology adjusting medications and recommend sleep study. On abx for CT chest findings of b/l airspace disease but also w/ 1.7 x 1.3cm RLL nodule and mediastinal adenopathy concerning for neoplasm. Will need f/u CT scan and possible bx, will see Dr Patrick Mares w/ pulm in clinic in 4-6 weeks. Current medical issues leading to Palliative Medicine involvement include: care decisions. 
  
I met with patient as follow up to Dr. Mily Ayers meeting ACP conversation yesterday. 1. Revisited AMD. Patient seemed to think living will was a financial document and I reviewed the document with her. Patient had not yet talked to the family members she wanted to serve as MPOA. Patient verbalized she would want eldest son Domingo Paz to be [employer], then sister Emma House as [employer] and niece Debby  as [place]. She did not remember phone numbers for anyone but sister and she said her sister was at work at this time and could not be reached. 2. Living Will: Patient said she would not life prolonging interventions if she is at the end of life. She would not want to be an organ donor. 3. Psycho: patient is pleasant, mostly understanding of task at hand but could use family moral support in completing documents 4. Social: , 4 children Mateo Hernadez who she lives with; Quique Garcia, [place] [employer] [facility]), 6 living sisters (3 dx), nieces and nephews, lots of family support Goals of Care / Plan:  
 
Returns later to call family with patient/complete AMD 
 
 
Thank you for the opportunity to be involved in the care of Ms. Claros. Adalgisa Aceves, \[facility] [place] [facility]\"" Palliative Medicine  [phone]

## 2019-03-27 LAB
ANION GAP SERPL CALC-SCNC: 6 MMOL/L (ref 5–15)
BASOPHILS # BLD: 0 K/UL (ref 0–0.1)
BASOPHILS NFR BLD: 0 % (ref 0–1)
BUN SERPL-MCNC: 47 MG/DL (ref 6–20)
BUN/CREAT SERPL: 28 (ref 12–20)
CALCIUM SERPL-MCNC: 7.9 MG/DL (ref 8.5–10.1)
CHLORIDE SERPL-SCNC: 107 MMOL/L (ref 97–108)
CO2 SERPL-SCNC: 29 MMOL/L (ref 21–32)
CREAT SERPL-MCNC: 1.7 MG/DL (ref 0.55–1.02)
DIFFERENTIAL METHOD BLD: ABNORMAL
EOSINOPHIL # BLD: 0 K/UL (ref 0–0.4)
EOSINOPHIL NFR BLD: 0 % (ref 0–7)
ERYTHROCYTE [DISTWIDTH] IN BLOOD BY AUTOMATED COUNT: 23.9 % (ref 11.5–14.5)
FERRITIN SERPL-MCNC: 33 NG/ML (ref 26–388)
FOLATE SERPL-MCNC: 17.4 NG/ML (ref 5–21)
GLUCOSE BLD STRIP.AUTO-MCNC: 115 MG/DL (ref 65–100)
GLUCOSE BLD STRIP.AUTO-MCNC: 203 MG/DL (ref 65–100)
GLUCOSE BLD STRIP.AUTO-MCNC: 281 MG/DL (ref 65–100)
GLUCOSE BLD STRIP.AUTO-MCNC: 429 MG/DL (ref 65–100)
GLUCOSE BLD STRIP.AUTO-MCNC: 467 MG/DL (ref 65–100)
GLUCOSE SERPL-MCNC: 148 MG/DL (ref 65–100)
HCT VFR BLD AUTO: 21.4 % (ref 35–47)
HGB BLD-MCNC: 6.8 G/DL (ref 11.5–16)
IMM GRANULOCYTES # BLD AUTO: 0 K/UL (ref 0–0.04)
IMM GRANULOCYTES NFR BLD AUTO: 0 % (ref 0–0.5)
IRON SATN MFR SERPL: 3 % (ref 20–50)
IRON SERPL-MCNC: 8 UG/DL (ref 35–150)
LDH SERPL L TO P-CCNC: 147 U/L (ref 81–246)
LYMPHOCYTES # BLD: 1.2 K/UL (ref 0.8–3.5)
LYMPHOCYTES NFR BLD: 14 % (ref 12–49)
MAGNESIUM SERPL-MCNC: 2.6 MG/DL (ref 1.6–2.4)
MCH RBC QN AUTO: 20.5 PG (ref 26–34)
MCHC RBC AUTO-ENTMCNC: 31.8 G/DL (ref 30–36.5)
MCV RBC AUTO: 64.7 FL (ref 80–99)
MONOCYTES # BLD: 0.9 K/UL (ref 0–1)
MONOCYTES NFR BLD: 11 % (ref 5–13)
NEUTS SEG # BLD: 6.5 K/UL (ref 1.8–8)
NEUTS SEG NFR BLD: 75 % (ref 32–75)
NRBC # BLD: 0 K/UL (ref 0–0.01)
NRBC BLD-RTO: 0 PER 100 WBC
PLATELET # BLD AUTO: 233 K/UL (ref 150–400)
PMV BLD AUTO: ABNORMAL FL (ref 8.9–12.9)
POTASSIUM SERPL-SCNC: 4.4 MMOL/L (ref 3.5–5.1)
RBC # BLD AUTO: 3.31 M/UL (ref 3.8–5.2)
RBC MORPH BLD: ABNORMAL
RETICS # AUTO: 0.05 M/UL (ref 0.02–0.08)
RETICS/RBC NFR AUTO: 1.6 % (ref 0.7–2.1)
SERVICE CMNT-IMP: ABNORMAL
SODIUM SERPL-SCNC: 142 MMOL/L (ref 136–145)
TIBC SERPL-MCNC: 278 UG/DL (ref 250–450)
TSH SERPL DL<=0.05 MIU/L-ACNC: 0.3 UIU/ML (ref 0.36–3.74)
VIT B12 SERPL-MCNC: 360 PG/ML (ref 193–986)
WBC # BLD AUTO: 8.6 K/UL (ref 3.6–11)

## 2019-03-27 PROCEDURE — 83615 LACTATE (LD) (LDH) ENZYME: CPT

## 2019-03-27 PROCEDURE — 84443 ASSAY THYROID STIM HORMONE: CPT

## 2019-03-27 PROCEDURE — 87040 BLOOD CULTURE FOR BACTERIA: CPT

## 2019-03-27 PROCEDURE — 65660000000 HC RM CCU STEPDOWN

## 2019-03-27 PROCEDURE — 74011000258 HC RX REV CODE- 258: Performed by: INTERNAL MEDICINE

## 2019-03-27 PROCEDURE — 85045 AUTOMATED RETICULOCYTE COUNT: CPT

## 2019-03-27 PROCEDURE — 80048 BASIC METABOLIC PNL TOTAL CA: CPT

## 2019-03-27 PROCEDURE — 83735 ASSAY OF MAGNESIUM: CPT

## 2019-03-27 PROCEDURE — 74011250637 HC RX REV CODE- 250/637: Performed by: FAMILY MEDICINE

## 2019-03-27 PROCEDURE — 74011636637 HC RX REV CODE- 636/637: Performed by: INTERNAL MEDICINE

## 2019-03-27 PROCEDURE — 82607 VITAMIN B-12: CPT

## 2019-03-27 PROCEDURE — P9016 RBC LEUKOCYTES REDUCED: HCPCS

## 2019-03-27 PROCEDURE — 74011250637 HC RX REV CODE- 250/637: Performed by: NURSE PRACTITIONER

## 2019-03-27 PROCEDURE — 74011250637 HC RX REV CODE- 250/637: Performed by: INTERNAL MEDICINE

## 2019-03-27 PROCEDURE — 74011250636 HC RX REV CODE- 250/636: Performed by: PHYSICIAN ASSISTANT

## 2019-03-27 PROCEDURE — 74011250636 HC RX REV CODE- 250/636: Performed by: INTERNAL MEDICINE

## 2019-03-27 PROCEDURE — 36415 COLL VENOUS BLD VENIPUNCTURE: CPT

## 2019-03-27 PROCEDURE — 86900 BLOOD TYPING SEROLOGIC ABO: CPT

## 2019-03-27 PROCEDURE — 30233N1 TRANSFUSION OF NONAUTOLOGOUS RED BLOOD CELLS INTO PERIPHERAL VEIN, PERCUTANEOUS APPROACH: ICD-10-PCS | Performed by: NURSE PRACTITIONER

## 2019-03-27 PROCEDURE — 85025 COMPLETE CBC W/AUTO DIFF WBC: CPT

## 2019-03-27 PROCEDURE — 36430 TRANSFUSION BLD/BLD COMPNT: CPT

## 2019-03-27 PROCEDURE — 82746 ASSAY OF FOLIC ACID SERUM: CPT

## 2019-03-27 PROCEDURE — 83540 ASSAY OF IRON: CPT

## 2019-03-27 PROCEDURE — 86923 COMPATIBILITY TEST ELECTRIC: CPT

## 2019-03-27 PROCEDURE — 82962 GLUCOSE BLOOD TEST: CPT

## 2019-03-27 PROCEDURE — 82728 ASSAY OF FERRITIN: CPT

## 2019-03-27 RX ORDER — INSULIN LISPRO 100 [IU]/ML
12 INJECTION, SOLUTION INTRAVENOUS; SUBCUTANEOUS ONCE
Status: COMPLETED | OUTPATIENT
Start: 2019-03-27 | End: 2019-03-27

## 2019-03-27 RX ORDER — METHYLPREDNISOLONE ACETATE 40 MG/ML
40 INJECTION, SUSPENSION INTRA-ARTICULAR; INTRALESIONAL; INTRAMUSCULAR; SOFT TISSUE ONCE
Status: COMPLETED | OUTPATIENT
Start: 2019-03-27 | End: 2019-03-27

## 2019-03-27 RX ORDER — SODIUM CHLORIDE 9 MG/ML
250 INJECTION, SOLUTION INTRAVENOUS AS NEEDED
Status: DISCONTINUED | OUTPATIENT
Start: 2019-03-27 | End: 2019-03-28 | Stop reason: HOSPADM

## 2019-03-27 RX ADMIN — ACETAMINOPHEN 500 MG: 500 TABLET ORAL at 11:25

## 2019-03-27 RX ADMIN — Medication 10 ML: at 05:38

## 2019-03-27 RX ADMIN — AZITHROMYCIN 250 MG: 250 TABLET, FILM COATED ORAL at 08:53

## 2019-03-27 RX ADMIN — INSULIN GLARGINE 25 UNITS: 100 INJECTION, SOLUTION SUBCUTANEOUS at 11:09

## 2019-03-27 RX ADMIN — METHYLPREDNISOLONE ACETATE 40 MG: 40 INJECTION, SUSPENSION INTRA-ARTICULAR; INTRALESIONAL; INTRAMUSCULAR; SOFT TISSUE at 09:56

## 2019-03-27 RX ADMIN — Medication 10 ML: at 13:05

## 2019-03-27 RX ADMIN — INSULIN LISPRO 12 UNITS: 100 INJECTION, SOLUTION INTRAVENOUS; SUBCUTANEOUS at 23:06

## 2019-03-27 RX ADMIN — PREGABALIN 150 MG: 75 CAPSULE ORAL at 18:58

## 2019-03-27 RX ADMIN — POTASSIUM CHLORIDE 10 MEQ: 750 TABLET, EXTENDED RELEASE ORAL at 08:53

## 2019-03-27 RX ADMIN — INSULIN GLARGINE 25 UNITS: 100 INJECTION, SOLUTION SUBCUTANEOUS at 23:06

## 2019-03-27 RX ADMIN — LEVOTHYROXINE SODIUM 175 MCG: 150 TABLET ORAL at 05:38

## 2019-03-27 RX ADMIN — AMLODIPINE BESYLATE 10 MG: 5 TABLET ORAL at 08:53

## 2019-03-27 RX ADMIN — CARVEDILOL 25 MG: 12.5 TABLET, FILM COATED ORAL at 18:58

## 2019-03-27 RX ADMIN — Medication 1 CAPSULE: at 08:54

## 2019-03-27 RX ADMIN — ATORVASTATIN CALCIUM 20 MG: 20 TABLET, FILM COATED ORAL at 08:54

## 2019-03-27 RX ADMIN — INSULIN LISPRO 3 UNITS: 100 INJECTION, SOLUTION INTRAVENOUS; SUBCUTANEOUS at 12:08

## 2019-03-27 RX ADMIN — CARVEDILOL 25 MG: 12.5 TABLET, FILM COATED ORAL at 08:53

## 2019-03-27 RX ADMIN — Medication 10 ML: at 23:06

## 2019-03-27 RX ADMIN — CEFTRIAXONE 1 G: 1 INJECTION, POWDER, FOR SOLUTION INTRAMUSCULAR; INTRAVENOUS at 11:09

## 2019-03-27 RX ADMIN — FERROUS SULFATE TAB 325 MG (65 MG ELEMENTAL FE) 325 MG: 325 (65 FE) TAB at 08:54

## 2019-03-27 RX ADMIN — INSULIN LISPRO 5 UNITS: 100 INJECTION, SOLUTION INTRAVENOUS; SUBCUTANEOUS at 18:58

## 2019-03-27 RX ADMIN — PREGABALIN 150 MG: 75 CAPSULE ORAL at 08:54

## 2019-03-27 RX ADMIN — POLYETHYLENE GLYCOL 3350 17 G: 17 POWDER, FOR SOLUTION ORAL at 08:54

## 2019-03-27 NOTE — DIABETES MGMT
DTC Progress Note Recommendations/ Comments:Chart reviewed due to variable blood sugars. Blood sugars 115-274 mg/dl. If appropriate, please consider increasing morning dose of Lantus to 35 units. Current hospital DM medication: Lantus 25 units BID and correction scale Humalog with normal sensitivity. Chart reviewed on Angélica Claros. Patient is a 80 y.o. female with known diabetes on Lantus 35 units BID at home. A1c:  
Lab Results Component Value Date/Time Hemoglobin A1c 6.3 03/24/2019 04:15 AM  
 Hemoglobin A1c 8.4 (H) 08/10/2016 04:40 AM  
 
 
Recent Glucose Results:  
Lab Results Component Value Date/Time  (H) 03/27/2019 01:23 AM  
 GLUCPOC 203 (H) 03/27/2019 11:32 AM  
 GLUCPOC 115 (H) 03/27/2019 07:47 AM  
 GLUCPOC 274 (H) 03/26/2019 08:55 PM  
  
 
Lab Results Component Value Date/Time Creatinine 1.70 (H) 03/27/2019 01:23 AM  
 
Estimated Creatinine Clearance: 30.8 mL/min (A) (based on SCr of 1.7 mg/dL (H)). Active Orders Diet DIET DIABETIC CONSISTENT CARB Regular; FR 1200ML; AHA-LOW-CHOL FAT  
  
 
PO intake:  
Patient Vitals for the past 72 hrs: 
 % Diet Eaten  
03/27/19 1248 75 %  
03/27/19 0900 75 %  
03/27/19 0848 75 %  
03/26/19 1247 80 % 03/26/19 0922 80 % 03/25/19 1300 50 % 03/25/19 0930 100 % Will continue to follow as needed. Thank you You Simmons RD, CDE Time spent: 5 minutes

## 2019-03-27 NOTE — PROGRESS NOTES
Problem: Breathing Pattern - Ineffective Goal: *Absence of hypoxia Outcome: Progressing Towards Goal 
Goal: *Use of effective breathing techniques Outcome: Progressing Towards Goal 
  
Problem: Patient Education: Go to Patient Education Activity Goal: Patient/Family Education Outcome: Progressing Towards Goal 
  
Problem: Falls - Risk of 
Goal: *Absence of Falls Description Document Harriett De La Cruz Fall Risk and appropriate interventions in the flowsheet. Outcome: Progressing Towards Goal 
  
Problem: Patient Education: Go to Patient Education Activity Goal: Patient/Family Education Outcome: Progressing Towards Goal 
  
Problem: Pressure Injury - Risk of 
Goal: *Prevention of pressure injury Description Document Ayaan Scale and appropriate interventions in the flowsheet. Outcome: Progressing Towards Goal 
  
Problem: Patient Education: Go to Patient Education Activity Goal: Patient/Family Education Outcome: Progressing Towards Goal 
  
Problem: Patient Education: Go to Patient Education Activity Goal: Patient/Family Education Outcome: Progressing Towards Goal 
  
Problem: Patient Education: Go to Patient Education Activity Goal: Patient/Family Education Outcome: Progressing Towards Goal 
  
Problem: Heart Failure: Day 1 Goal: Off Pathway (Use only if patient is Off Pathway) Outcome: Progressing Towards Goal 
Goal: Activity/Safety Outcome: Progressing Towards Goal 
Goal: Consults, if ordered Outcome: Progressing Towards Goal 
Goal: Diagnostic Test/Procedures Outcome: Progressing Towards Goal 
Goal: Nutrition/Diet Outcome: Progressing Towards Goal 
Goal: Discharge Planning Outcome: Progressing Towards Goal 
Goal: Medications Outcome: Progressing Towards Goal 
Goal: Respiratory Outcome: Progressing Towards Goal 
Goal: Treatments/Interventions/Procedures Outcome: Progressing Towards Goal 
Goal: Psychosocial 
Outcome: Progressing Towards Goal 
Goal: *Oxygen saturation within defined limits Outcome: Progressing Towards Goal 
Goal: *Hemodynamically stable Outcome: Progressing Towards Goal 
Goal: *Optimal pain control at patient's stated goal 
Outcome: Progressing Towards Goal 
Goal: *Anxiety reduced or absent Outcome: Progressing Towards Goal 
  
Problem: Heart Failure: Day 2 Goal: Off Pathway (Use only if patient is Off Pathway) Outcome: Progressing Towards Goal 
Goal: Activity/Safety Outcome: Progressing Towards Goal 
Goal: Consults, if ordered Outcome: Progressing Towards Goal 
Goal: Diagnostic Test/Procedures Outcome: Progressing Towards Goal 
Goal: Nutrition/Diet Outcome: Progressing Towards Goal 
Goal: Discharge Planning Outcome: Progressing Towards Goal 
Goal: Medications Outcome: Progressing Towards Goal 
Goal: Respiratory Outcome: Progressing Towards Goal 
Goal: Treatments/Interventions/Procedures Outcome: Progressing Towards Goal 
Goal: Psychosocial 
Outcome: Progressing Towards Goal 
Goal: *Oxygen saturation within defined limits Outcome: Progressing Towards Goal 
Goal: *Hemodynamically stable Outcome: Progressing Towards Goal 
Goal: *Optimal pain control at patient's stated goal 
Outcome: Progressing Towards Goal 
Goal: *Anxiety reduced or absent Outcome: Progressing Towards Goal 
Goal: *Demonstrates progressive activity Outcome: Progressing Towards Goal 
  
Problem: Heart Failure: Day 3 Goal: Off Pathway (Use only if patient is Off Pathway) Outcome: Progressing Towards Goal 
Goal: Activity/Safety Outcome: Progressing Towards Goal 
Goal: Diagnostic Test/Procedures Outcome: Progressing Towards Goal 
Goal: Nutrition/Diet Outcome: Progressing Towards Goal 
Goal: Discharge Planning Outcome: Progressing Towards Goal 
Goal: Medications Outcome: Progressing Towards Goal 
Goal: Respiratory Outcome: Progressing Towards Goal 
Goal: Treatments/Interventions/Procedures Outcome: Progressing Towards Goal 
Goal: Psychosocial 
Outcome: Progressing Towards Goal 
 Goal: *Oxygen saturation within defined limits Outcome: Progressing Towards Goal 
Goal: *Hemodynamically stable Outcome: Progressing Towards Goal 
Goal: *Optimal pain control at patient's stated goal 
Outcome: Progressing Towards Goal 
Goal: *Anxiety reduced or absent Outcome: Progressing Towards Goal 
Goal: *Demonstrates progressive activity Outcome: Progressing Towards Goal 
  
Problem: Heart Failure: Day 4 Goal: Off Pathway (Use only if patient is Off Pathway) Outcome: Progressing Towards Goal 
Goal: Activity/Safety Outcome: Progressing Towards Goal 
Goal: Diagnostic Test/Procedures Outcome: Progressing Towards Goal 
Goal: Nutrition/Diet Outcome: Progressing Towards Goal 
Goal: Discharge Planning Outcome: Progressing Towards Goal 
Goal: Medications Outcome: Progressing Towards Goal 
Goal: Respiratory Outcome: Progressing Towards Goal 
Goal: Treatments/Interventions/Procedures Outcome: Progressing Towards Goal 
Goal: Psychosocial 
Outcome: Progressing Towards Goal 
Goal: *Oxygen saturation within defined limits Outcome: Progressing Towards Goal 
Goal: *Hemodynamically stable Outcome: Progressing Towards Goal 
Goal: *Optimal pain control at patient's stated goal 
Outcome: Progressing Towards Goal 
Goal: *Anxiety reduced or absent Outcome: Progressing Towards Goal 
Goal: *Demonstrates progressive activity Outcome: Progressing Towards Goal 
  
Problem: Heart Failure: Day 5 Goal: Off Pathway (Use only if patient is Off Pathway) Outcome: Progressing Towards Goal 
Goal: Activity/Safety Outcome: Progressing Towards Goal 
Goal: Diagnostic Test/Procedures Outcome: Progressing Towards Goal 
Goal: Nutrition/Diet Outcome: Progressing Towards Goal 
Goal: Discharge Planning Outcome: Progressing Towards Goal 
Goal: Medications Outcome: Progressing Towards Goal 
Goal: Respiratory Outcome: Progressing Towards Goal 
Goal: Treatments/Interventions/Procedures Outcome: Progressing Towards Goal 
 Goal: Psychosocial 
Outcome: Progressing Towards Goal 
  
Problem: Heart Failure: Discharge Outcomes Goal: *Demonstrates ability to perform prescribed activity without shortness of breath or discomfort Outcome: Progressing Towards Goal 
Goal: *Left ventricular function assessment completed prior to or during stay, or planned for post-discharge Outcome: Progressing Towards Goal 
Goal: *ACEI prescribed if LVEF less than 40% and no contraindications or ARB prescribed Outcome: Progressing Towards Goal 
Goal: *Verbalizes understanding and describes prescribed diet Outcome: Progressing Towards Goal 
Goal: *Verbalizes understanding/describes prescribed medications Outcome: Progressing Towards Goal 
Goal: *Describes available resources and support systems Description 
(eg: Home Health, Palliative Care, Advanced Medical Directive) Outcome: Progressing Towards Goal 
Goal: *Describes smoking cessation resources Outcome: Progressing Towards Goal 
Goal: *Understands and describes signs and symptoms to report to providers(Stroke Metric) Outcome: Progressing Towards Goal 
Goal: *Describes/verbalizes understanding of follow-up/return appt Description 
(eg: to physicians, diabetes treatment coordinator, and other resources Outcome: Progressing Towards Goal 
Goal: *Describes importance of continuing daily weights and changes to report to physician Outcome: Progressing Towards Goal

## 2019-03-27 NOTE — CONSULTS
JOINT  CONSULT    Subjective:     Date of Consultation:  March 27, 2019    Referring Physician:  hospitalist    Ebb Goltz is a 80 y.o. female who is being seen for right knee pain. Was admitted on 3/23/2019 from home via EMS w/ worsening SOB, history of DM, CAD w/ hx of MI.     Ambulates at baseline--cane/walker  6 weeks of issues- seen at [facility] ER and [facility] ER 2/9- . Right knee aspirated - DX with gout and had staph grow in the thio broth, likely contaminate   Pt denies F/C./flu like symptoms. Patient Active Problem List    Diagnosis Date Noted    HTN (hypertension) 03/24/2019    Hyperlipidemia 03/24/2019    Chronic renal insufficiency 03/24/2019    Anemia 03/24/2019    CHF (congestive heart failure) ([address] 03/23/2019    DM (diabetes mellitus) ([facility] 03/23/2019    Acute hypoxemic respiratory failure ([facility] 03/23/2019    Malignant hypertension 08/09/2016     Family History   Problem Relation Age of Onset    Cancer Mother     Cancer Father     Cancer Sister     Heart Disease Sister     Diabetes Other         multiple family members      Social History     Tobacco Use    Smoking status: Never Smoker    Smokeless tobacco: Never Used   Substance Use Topics    Alcohol use: No     Past Medical History:   Diagnosis Date    Diabetes ([address]     Heart attack ([address] 2009    Hyperlipidemia     Hypertension       Past Surgical History:   Procedure Laterality Date    HX THYROIDECTOMY  2005      Prior to Admission medications    Medication Sig Start Date End Date Taking? Authorizing Provider   amLODIPine (NORVASC) 5 mg tablet Take 2.5 mg by mouth daily. Yes Provider, Historical   pregabalin (LYRICA) 150 mg capsule Take  by mouth two (2) times a day. Indications: Diabetic Complication causing Injury to some Body Nerves   Yes Provider, Historical   insulin glargine (LANTUS SOLOSTAR U-100 INSULIN) 100 unit/mL (3 mL) inpn 35 Units by SubCUTAneous route two (2) times a day.    Yes Provider, Historical carvedilol (COREG) 25 mg tablet Take 25 mg by mouth two (2) times daily (with meals). Jonah Jimenez MD   lisinopril (PRINIVIL, ZESTRIL) 40 mg tablet Take 40 mg by mouth daily. Jonah Jimenez MD   hydroCHLOROthiazide (HYDRODIURIL) 25 mg tablet Take 25 mg by mouth daily. Jonah Jimenez MD   diclofenac (VOLTAREN) 1 % gel Apply 2 g to affected area four (4) times daily. 2/10/19   Erinn Quiet, DO   oxyCODONE IR (ROXICODONE) 5 mg immediate release tablet 1-2 tabs every 4-6 hours as needed for pain 2/10/19   Erinn Quiet, DO   atorvastatin (LIPITOR) 20 mg tablet Take 20 mg by mouth daily. Jonah Jimenez MD   levothyroxine (SYNTHROID) 175 mcg tablet Take 175 mcg by mouth Daily (before breakfast). Jonah Jimenez MD   aspirin delayed-release 81 mg tablet Take 81 mg by mouth daily.     Jonah Jimenez MD     Current Facility-Administered Medications   Medication Dose Route Frequency    0.9% sodium chloride infusion 250 mL  250 mL IntraVENous PRN    amLODIPine (NORVASC) tablet 10 mg  10 mg Oral DAILY    lactobac ac& pc-s.therm-b.anim (BRIGHT Q/RISAQUAD)  1 Cap Oral DAILY    albuterol-ipratropium (DUO-NEB) 2.5 MG-0.5 MG/3 ML  3 mL Nebulization Q6H PRN    nitroglycerin (NITROBID) 2 % ointment 1 Inch  1 Inch Topical TID PRN    ferrous sulfate tablet 325 mg  1 Tab Oral DAILY WITH BREAKFAST    traMADol (ULTRAM) tablet 50 mg  50 mg Oral Q6H PRN    polyethylene glycol (MIRALAX) packet 17 g  17 g Oral DAILY    azithromycin (ZITHROMAX) tablet 250 mg  250 mg Oral DAILY    acetaminophen (TYLENOL) tablet 500 mg  500 mg Oral Q4H PRN    cefTRIAXone (ROCEPHIN) 1 g in 0.9% sodium chloride (MBP/ADV) 50 mL  1 g IntraVENous Q24H    potassium chloride SR (KLOR-CON 10) tablet 10 mEq  10 mEq Oral DAILY    pregabalin (LYRICA) capsule 150 mg  150 mg Oral BID    insulin lispro (HUMALOG) injection   SubCUTAneous AC&HS    atorvastatin (LIPITOR) tablet 20 mg  20 mg Oral DAILY    levothyroxine (SYNTHROID) tablet 175 mcg  175 mcg Oral 6am  carvedilol (COREG) tablet 25 mg  25 mg Oral BID WITH MEALS    docusate sodium (COLACE) capsule 100 mg  100 mg Oral PRN    glucose chewable tablet 16 g  4 Tab Oral PRN    dextrose (D50W) injection syrg 12.5-25 g  25-50 mL IntraVENous PRN    glucagon (GLUCAGEN) injection 1 mg  1 mg IntraMUSCular PRN    sodium chloride (NS) flush 5-40 mL  5-40 mL IntraVENous Q8H    sodium chloride (NS) flush 5-40 mL  5-40 mL IntraVENous PRN    hydrALAZINE (APRESOLINE) 20 mg/mL injection 10 mg  10 mg IntraVENous Q6H PRN    insulin glargine (LANTUS) injection 25 Units  25 Units SubCUTAneous BID     Allergies   Allergen Reactions    Trazodone Anxiety        Review of Systems:  Pertinent items are noted in HPI. Objective:     Patient Vitals for the past 8 hrs:   BP Temp Pulse Resp SpO2   19 1212 123/57 99.4 °F (37.4 °C) 69 18 99 %   19 1145 122/59 99.5 °F (37.5 °C) 67 20 99 %   19 1121 122/50 99.6 °F (37.6 °C) 68 18 99 %   19 1050 122/48 99.6 °F (37.6 °C)  16    19 1037 111/50 98.8 °F (37.1 °C)  16 99 %   19 1018 108/55 98.9 °F (37.2 °C) 67 16 98 %   19 0853   71     19 0845 130/70 99.3 °F (37.4 °C) 69 18 98 %     Temp (24hrs), Av.4 °F (37.4 °C), Min:98.7 °F (37.1 °C), Max:101.4 °F (38.6 °C)          Gen: Well-developed,  in no acute distress, obese  HEENT: Pink conjunctivae, hearing intact to voice, moist mucous membranes   Neck: Supple  Resp: No respiratory distress   Card: RRR, palpable distal pulse-equal bilaterally, birsk cap refill all distal digits   Abd: non-distended  Musc: right knee with + effusion, moderate +TTP , intact ext mech, stable collaterals   Skin: No skin breakdown noted. Skin warm, pink, dry  Neuro: Cranial nerves are grossly intact, no focal motor weakness, follows commands appropriately   Psych: Good insight, oriented to person, place and time, alert     Imaging Review: XR KNEE RT 3 V   INDICATION: R knee pain and swelling.    COMPARISON: 2/10/2019.   FINDINGS: Three views of the right knee demonstrate no fracture. There is  tricompartmental erosive arthritis involving the patellofemoral compartment to a  greater degree than the medial compartment. A small joint effusion is present. Faint chondrocalcinosis is present.   IMPRESSION: Tricompartmental erosive arthritis, most pronounced in the  patellofemoral compartment, most suggestive of CPPD arthropathy.           Data Review   Recent Results (from the past 24 hour(s))   GLUCOSE, POC    Collection Time: 03/26/19  5:35 PM   Result Value Ref Range    Glucose (POC) 228 (H) 65 - 100 mg/dL    Performed by Lucila Hogan, SYNOVIAL FLUID    Collection Time: 03/26/19  6:16 PM   Result Value Ref Range    FLUID TYPE(7) RIGHT KNEE      Crystals, body fluid NO CRYSTALS SEEN WITH POLARIZED LIGHT     CULTURE, BODY FLUID W GRAM STAIN    Collection Time: 03/26/19  6:16 PM   Result Value Ref Range    Special Requests: NO SPECIAL REQUESTS      GRAM STAIN OCCASIONAL WBCS SEEN      GRAM STAIN NO ORGANISMS SEEN      Culture result: Culture performed on Fluid swab specimen      Culture result: NO GROWTH IN 13 HOURS    CELL COUNT, SYNOVIAL    Collection Time: 03/26/19  6:16 PM   Result Value Ref Range    SYNOVIAL FLD SITE KNEE      SYN FLUID COLOR YELLOW      SYN FLUID APPEARANCE CLOUDY      SYNOVIAL FLD RBC CT >100 (H) 0 /cu mm    SYNOVIAL FLD WBC CT 29,673 (H) 0 - 150 /cu mm    SYNOVIAL FLD SEGS 93 (H) 0 - 24 %    SYNOVIAL FLD LYMPHS 2 0 - 74 %    SYNOVIAL FLD MONOS 5 0 - 69 %   GLUCOSE, POC    Collection Time: 03/26/19  8:55 PM   Result Value Ref Range    Glucose (POC) 274 (H) 65 - 100 mg/dL    Performed by Jessica Avila    IRON PROFILE    Collection Time: 03/27/19  1:23 AM   Result Value Ref Range    Iron 8 (L) 35 - 150 ug/dL    TIBC 278 250 - 450 ug/dL    Iron % saturation 3 (L) 20 - 50 %   LD    Collection Time: 03/27/19  1:23 AM   Result Value Ref Range     81 - 246 U/L   RETICULOCYTE COUNT    Collection Time: 03/27/19  1:23 AM   Result Value Ref Range    Reticulocyte count 1.6 0.7 - 2.1 %    Absolute Retic Cnt. 0.0541 0.0164 - 2.5018 M/ul   METABOLIC PANEL, BASIC    Collection Time: 03/27/19  1:23 AM   Result Value Ref Range    Sodium 142 136 - 145 mmol/L    Potassium 4.4 3.5 - 5.1 mmol/L    Chloride 107 97 - 108 mmol/L    CO2 29 21 - 32 mmol/L    Anion gap 6 5 - 15 mmol/L    Glucose 148 (H) 65 - 100 mg/dL    BUN 47 (H) 6 - 20 MG/DL    Creatinine 1.70 (H) 0.55 - 1.02 MG/DL    BUN/Creatinine ratio 28 (H) 12 - 20      GFR est AA 35 (L) >60 ml/min/1.73m2    GFR est non-AA 29 (L) >60 ml/min/1.73m2    Calcium 7.9 (L) 8.5 - 10.1 MG/DL   MAGNESIUM    Collection Time: 03/27/19  1:23 AM   Result Value Ref Range    Magnesium 2.6 (H) 1.6 - 2.4 mg/dL   CBC WITH AUTOMATED DIFF    Collection Time: 03/27/19  1:23 AM   Result Value Ref Range    WBC 8.6 3.6 - 11.0 K/uL    RBC 3.31 (L) 3.80 - 5.20 M/uL    HGB 6.8 (L) 11.5 - 16.0 g/dL    HCT 21.4 (L) 35.0 - 47.0 %    MCV 64.7 (L) 80.0 - 99.0 FL    MCH 20.5 (L) 26.0 - 34.0 PG    MCHC 31.8 30.0 - 36.5 g/dL    RDW 23.9 (H) 11.5 - 14.5 %    PLATELET 070 517 - 285 K/uL    MPV ABNORMAL 8.9 - 12.9 FL    NRBC 0.0 0  WBC    ABSOLUTE NRBC 0.00 0.00 - 0.01 K/uL    NEUTROPHILS 75 32 - 75 %    LYMPHOCYTES 14 12 - 49 %    MONOCYTES 11 5 - 13 %    EOSINOPHILS 0 0 - 7 %    BASOPHILS 0 0 - 1 %    IMMATURE GRANULOCYTES 0 0.0 - 0.5 %    ABS. NEUTROPHILS 6.5 1.8 - 8.0 K/UL    ABS. LYMPHOCYTES 1.2 0.8 - 3.5 K/UL    ABS. MONOCYTES 0.9 0.0 - 1.0 K/UL    ABS. EOSINOPHILS 0.0 0.0 - 0.4 K/UL    ABS. BASOPHILS 0.0 0.0 - 0.1 K/UL    ABS. IMM.  GRANS. 0.0 0.00 - 0.04 K/UL    DF AUTOMATED      RBC COMMENTS TARGET CELLS  1+        RBC COMMENTS ANISOCYTOSIS  2+        RBC COMMENTS HYPOCHROMIA  2+        RBC COMMENTS MICROCYTOSIS  2+        RBC COMMENTS RBC FRAGMENTS  PRESENT       VITAMIN B12    Collection Time: 03/27/19  1:23 AM   Result Value Ref Range    Vitamin B12 360 193 - 986 pg/mL   FERRITIN Collection Time: 03/27/19  1:23 AM   Result Value Ref Range    Ferritin 33 26 - 388 NG/ML   FOLATE    Collection Time: 03/27/19  1:23 AM   Result Value Ref Range    Folate 17.4 5.0 - 21.0 ng/mL   TSH 3RD GENERATION    Collection Time: 03/27/19  1:23 AM   Result Value Ref Range    TSH 0.30 (L) 0.36 - 3.74 uIU/mL   GLUCOSE, POC    Collection Time: 03/27/19  7:47 AM   Result Value Ref Range    Glucose (POC) 115 (H) 65 - 100 mg/dL    Performed by Tal Fajardo  (PCT)    TYPE + CROSSMATCH    Collection Time: 03/27/19  7:48 AM   Result Value Ref Range    Crossmatch Expiration 03/30/2019     ABO/Rh(D) Orlene She NEGATIVE     Antibody screen NEG     Unit number [id number]     Blood component type RC LR     Unit division 00     Status of unit ISSUED     Crossmatch result Compatible    GLUCOSE, POC    Collection Time: 03/27/19 11:32 AM   Result Value Ref Range    Glucose (POC) 203 (H) 65 - 100 mg/dL    Performed by Tal Fajardo  (PCT)          Assessment/Plan:     79 yo female with R knee pain/effusion and pseudogout    -low concern for infection based on cell count and patient history/symptoms, discussed CSI for pain control and to decrease inflammation  -activities as tolerated  -ice and elevate  -aspiration and injection performed, will follow labs        Mary Garcia DO

## 2019-03-27 NOTE — PROGRESS NOTES
[facility]: [phone] ([phone]) [facility]: [phone] ([phone]) LCSW went by patient's room twice to see if she wanted to re-visit AMD conversation. Not able to address this today. She was getting prepped initially for blood transfusion and later was fast asleep and due to procedures today, LCSW did not want to awaken her. Will try and see patient tomorrow.

## 2019-03-27 NOTE — PROGRESS NOTES
Bedside shift change report given to Arelis De La Cruz RN (oncoming nurse). Report included the following information SBAR, Kardex, Intake/Output, MAR and Recent Results. SHIFT SUMMARY: 
 
 
CONCERNS TO ADDRESS WITH MD: 
 
 
[facility] NURSING NOTE Admission Date 3/23/2019 Admission Diagnosis Acute hypoxemic respiratory failure ([facility] [J96.01] CHF (congestive heart failure) ([facility] [I50.9] DM (diabetes mellitus) ([facility] [E11.9] Consults IP CONSULT TO CARDIOLOGY 
IP CONSULT TO PALLIATIVE CARE - PROVIDER 
IP CONSULT TO PULMONOLOGY 
IP CONSULT TO ORTHOPEDIC SURGERY Cardiac Monitoring [x] Yes [] No  
  
Purposeful Hourly Rounding [x] Yes   
Estelle Score Total Score: 3 Estelle score 3 or > [x] Bed Alarm [] Avasys [] 1:1 sitter [] Patient refused (Signed refusal form in chart) Ayaan Score Ayaan Score: 16 Ayaan score 14 or < [x] PMT consult [] Wound Care consult  
 []  Specialty bed  [] Nutrition consult Influenza Vaccine Received Flu Vaccine for Current Season (usually Sept-March): No  
 Patient/Guardian Refused (Notify MD): No  
  
Oxygen needs? [x] Room air Oxygen @  []1L    []2L    []3L   []4L    []5L   []6L via NC Chronic home O2 use? [] Yes [x] No 
Perform O2 challenge test and document in progress note using smartphrase (.Homeoxygen) Last bowel movement Last Bowel Movement Date: 03/24/19 Urinary Catheter LDAs Peripheral IV 03/25/19 Left Antecubital (Active) Site Assessment Clean, dry, & intact 3/26/2019  8:01 PM  
Phlebitis Assessment 0 3/26/2019  8:01 PM  
Infiltration Assessment 0 3/26/2019  8:01 PM  
Dressing Status Clean, dry, & intact 3/26/2019  8:01 PM  
Dressing Type Transparent 3/26/2019  8:01 PM  
Hub Color/Line Status Capped 3/26/2019  8:01 PM  
                  
  
Readmission Risk Assessment Tool Score High Risk   
      
 24 Total Score 3 Has Seen PCP in Last 6 Months (Yes=3, No=0)  
 5 Pt. Coverage (Medicare=5 , Medicaid, or Self-Pay=4) 16 Charlson Comorbidity Score (Age + Comorbid Conditions) Criteria that do not apply:  
 . Living with Significant Other. Assisted Living. LTAC. SNF. or  
Rehab Patient Length of Stay (>5 days = 3) IP Visits Last 12 Months (1-3=4, 4=9, >4=11) Expected Length of Stay 4d 2h Actual Length of Stay 3

## 2019-03-27 NOTE — PROGRESS NOTES
Bedside shift change report given to Angela Vaughn RN (oncoming nurse) by Shanta (offgoing nurse). Report included the following information SBAR, Kardex, ED Summary, Procedure Summary, Intake/Output, MAR, Recent Results, Med Rec Status and Cardiac Rhythm nsr.

## 2019-03-27 NOTE — PROGRESS NOTES
[address] 
Report received from offgoing nurse EOS PENDING Bedside and Verbal shift change report given to oncoming nurse. Report included the following information SBAR.

## 2019-03-27 NOTE — PROGRESS NOTES
1018: Signed blood consent in chart, VS obtained prior to blood transfusion. Information about blood transfusion provided to patient, verbalized understanding. Patient resting quietly in bed with no complaint voiced. 1037: Initiated blood transfusion at 75ml/hr. 1055: VS stable, patient resting quietly in bed

## 2019-03-27 NOTE — PROGRESS NOTES
Ortho: 
Pt notes some improvement w/ right knee after aspiration, was able to get a good night sleep Right knee pain and swelling likely inflammatory Ortho: 
Right knee joint  injection; procedure described to pt, risk and benefits reviewed. Consent signed by pt. Depo-mederol and lidocaine (40:4) injected into right knee. Band-aid applied There were no complication pt tolerated it well Vitals stable before and after procedure Marilin Vilchis RN present and assisted throughout procedure Recommend out-pt FU with ortho as needed Plan per Dr Ignacia Parikh PA-C

## 2019-03-27 NOTE — PROGRESS NOTES
Hospitalist Progress Note NAME: Fernando Bruce :  1935 MRN:  [id number] Assessment / Plan: 
 
Episode of fever - T-max 101.4. Pair of blood cx sent Continue with current ABX No leukocystosis Acute hypoxemic respiratory failure ([facility] [address] (3/23/2019) CAP Lung nodule in right mid lung - CT of chest: Nodule 1.7 x 1.3 cm nodule right lower lobe along the oblique fissure with 
bilateral airspace disease most confluent at the right lung base and septal lobular thickening and right pleural effusion. No comment on presence of PE 
- appreciate pulmonology input; continue with Ceftriaxone and zithromax RA O2 Sat 99% Repeat CT in 4-6 weeks per pulmology team; Lesion not amenable to bronchoscopy or needle aspirate; tough location- explained to pt and she understands Lower extremity pain Arthritis - duplex study (-) DVT 
- right knee x-ray: Tricompartmental erosive arthritis, most pronounced in the 
patellofemoral compartment, most suggestive of CPPD arthropathy. Appreciate Ortho input; s/p arthrocentesis. Pt voices feeling much better. Continue with tylenol or ultram PRN for pain Hypertensive urgency Acute CHF (congestive heart failure) exacerbation ([facility] (3/23/2019)  HTN Hx of CAD Dyslipidemia 
- continue with ASA, Coreg, and amlodipine (dose increased in am). ACEi on hold due to worsening of renal function 
- will on lasix today due to ZACK; daily weight 113.3kg 
  fluid restriction 1200ml HCTZ changed to lasix per cardiology 3/25. 
- minimal elevation of NT pro- 
- Echo:EF 60%, mild MR, mod pHTN  
- continue with statin 
  
DM (diabetes mellitus) ([facility] (3/23/2019) - A1C 6.3. Continue with diabetic diet - check qac and qhs blood glucose and follow SSI Iron deficient anemia 
- heme (-) stool (3/24). Iron 8, TIBC 278, Iron sat 3%, Ferritin 33 Folate & vit B 12 within normal range 
- continue with Iron supplement - Hgb 6.8; transfuse 1 unit of pRBC. May need lasix after the transfution ZACK on CKD 
- creat trending down to 1.70 from 1.76 (was 0.9). Avoid nephrotoxic agents. Most likely secondary to diruesing - ZACK secondary over diuresing Constipation 
- start on daily miralax Hypothyroidism 
- continue with levothyroxine Obesity Body mass index is 42.76 kg/m². Code status: Full Prophylaxis: Hep SQ Recommended Disposition: ?[facility] Subjective: Chief Complaint / Reason for Physician Visit \"my leg feels better \". Discussed with RN events overnight. Review of Systems: 
Symptom Y/N Comments  Symptom Y/N Comments Fever/Chills n   Chest Pain n   
Poor Appetite    Edema y Cough    Abdominal Pain Sputum    Joint Pain SOB/NEWMAN n   Pruritis/Rash Nausea/vomit n   Tolerating PT/OT Diarrhea    Tolerating Diet Constipation n   Other Could NOT obtain due to:   
 
Objective: VITALS:  
Last 24hrs VS reviewed since prior progress note. Most recent are: 
Patient Vitals for the past 24 hrs: 
 Temp Pulse Resp BP SpO2  
03/27/19 0853  71     
03/27/19 0845 99.3 °F (37.4 °C) 69 18 130/70 98 % 03/27/19 0321 99 °F (37.2 °C) 64 18 110/55 95 % 03/27/19 0015 99 °F (37.2 °C)      
03/26/19 2333 (!) 101.4 °F (38.6 °C) 70 18 133/64 96 % 03/26/19 1953 99.7 °F (37.6 °C) 72 18 119/62 97 % 03/26/19 1547 98.7 °F (37.1 °C) 73 18 121/57 98 % 03/26/19 1104 98.7 °F (37.1 °C) 68 20 126/44 100 % Intake/Output Summary (Last 24 hours) at 3/27/2019 0932 Last data filed at 3/27/2019 0372 Gross per 24 hour Intake 600 ml Output 1425 ml Net -825 ml PHYSICAL EXAM: 
General: WD, WN. Alert, cooperative, no acute distress   
EENT:  EOMI. Anicteric sclerae. MMM Resp:  CTA bilaterally, no wheezing or rales. No accessory muscle use CV:  Regular  rhythm,  no edema GI:  Soft, obese, Non tender.  +Bowel sounds Neurologic:  Alert and oriented X 3, normal speech,  
 Psych:   Good insight. Not anxious nor agitated Skin:  No rashes. No jaundice Reviewed most current lab test results and cultures  YES Reviewed most current radiology test results   YES Review and summation of old records today    NO Reviewed patient's current orders and MAR    YES 
PMH/SH reviewed - no change compared to H&P 
________________________________________________________________________ Care Plan discussed with: 
  Comments Patient y Family RN y   
Care Manager y Consultant     
                 y Multidiciplinary team rounds were held today with , nursing, pharmacist and clinical coordinator. Patient's plan of care was discussed; medications were reviewed and discharge planning was addressed. ________________________________________________________________________ Total NON critical care TIME:  30  Minutes Total CRITICAL CARE TIME Spent:   Minutes non procedure based Comments >50% of visit spent in counseling and coordination of care    
________________________________________________________________________ Laura Nunez NP Procedures: see electronic medical records for all procedures/Xrays and details which were not copied into this note but were reviewed prior to creation of Plan. LABS: 
I reviewed today's most current labs and imaging studies. Pertinent labs include: 
Recent Labs  
  03/27/19 
0123 03/26/19 
0145 03/25/19 
0056 WBC 8.6 8.9 8.2 HGB 6.8* 7.2* 7.2* HCT 21.4* 23.1* 22.9*  
 265 272 Recent Labs  
  03/27/19 
0123 03/26/19 
0145 03/25/19 
0056  140 142  
K 4.4 4.0 3.8  104 104 CO2 29 29 32 * 137* 98 BUN 47* 37* 34* CREA 1.70* 1.76* 1.40* CA 7.9* 8.1* 8.4* MG 2.6*  --   --   
 
 
Signed: Laura Nunez, NP

## 2019-03-28 VITALS
TEMPERATURE: 98.1 F | BODY MASS INDEX: 42.36 KG/M2 | DIASTOLIC BLOOD PRESSURE: 54 MMHG | OXYGEN SATURATION: 99 % | HEIGHT: 64 IN | WEIGHT: 248.1 LBS | RESPIRATION RATE: 18 BRPM | HEART RATE: 60 BPM | SYSTOLIC BLOOD PRESSURE: 128 MMHG

## 2019-03-28 LAB
ABO + RH BLD: NORMAL
ANION GAP SERPL CALC-SCNC: 6 MMOL/L (ref 5–15)
APPEARANCE UR: CLEAR
BACTERIA URNS QL MICRO: NEGATIVE /HPF
BASOPHILS # BLD: 0 K/UL (ref 0–0.1)
BASOPHILS NFR BLD: 0 % (ref 0–1)
BILIRUB UR QL: NEGATIVE
BLD PROD TYP BPU: NORMAL
BLOOD GROUP ANTIBODIES SERPL: NORMAL
BPU ID: NORMAL
BUN SERPL-MCNC: 63 MG/DL (ref 6–20)
BUN/CREAT SERPL: 37 (ref 12–20)
CALCIUM SERPL-MCNC: 8.1 MG/DL (ref 8.5–10.1)
CHLORIDE SERPL-SCNC: 105 MMOL/L (ref 97–108)
CO2 SERPL-SCNC: 25 MMOL/L (ref 21–32)
COLOR UR: ABNORMAL
CREAT SERPL-MCNC: 1.7 MG/DL (ref 0.55–1.02)
CROSSMATCH RESULT,%XM: NORMAL
DIFFERENTIAL METHOD BLD: ABNORMAL
EOSINOPHIL # BLD: 0 K/UL (ref 0–0.4)
EOSINOPHIL NFR BLD: 0 % (ref 0–7)
EPITH CASTS URNS QL MICRO: ABNORMAL /LPF
ERYTHROCYTE [DISTWIDTH] IN BLOOD BY AUTOMATED COUNT: 23.3 % (ref 11.5–14.5)
GLUCOSE BLD STRIP.AUTO-MCNC: 275 MG/DL (ref 65–100)
GLUCOSE BLD STRIP.AUTO-MCNC: 382 MG/DL (ref 65–100)
GLUCOSE SERPL-MCNC: 277 MG/DL (ref 65–100)
GLUCOSE UR STRIP.AUTO-MCNC: NEGATIVE MG/DL
HCT VFR BLD AUTO: 25.5 % (ref 35–47)
HGB BLD-MCNC: 8.3 G/DL (ref 11.5–16)
HGB UR QL STRIP: NEGATIVE
IMM GRANULOCYTES # BLD AUTO: 0 K/UL (ref 0–0.04)
IMM GRANULOCYTES NFR BLD AUTO: 0 % (ref 0–0.5)
KETONES UR QL STRIP.AUTO: NEGATIVE MG/DL
LEUKOCYTE ESTERASE UR QL STRIP.AUTO: ABNORMAL
LYMPHOCYTES # BLD: 0.5 K/UL (ref 0.8–3.5)
LYMPHOCYTES NFR BLD: 5 % (ref 12–49)
MAGNESIUM SERPL-MCNC: 3.1 MG/DL (ref 1.6–2.4)
MCH RBC QN AUTO: 21.2 PG (ref 26–34)
MCHC RBC AUTO-ENTMCNC: 32.5 G/DL (ref 30–36.5)
MCV RBC AUTO: 65.1 FL (ref 80–99)
MONOCYTES # BLD: 0.4 K/UL (ref 0–1)
MONOCYTES NFR BLD: 4 % (ref 5–13)
NEUTS SEG # BLD: 9.3 K/UL (ref 1.8–8)
NEUTS SEG NFR BLD: 91 % (ref 32–75)
NITRITE UR QL STRIP.AUTO: NEGATIVE
NRBC # BLD: 0 K/UL (ref 0–0.01)
NRBC BLD-RTO: 0 PER 100 WBC
PH UR STRIP: 5 [PH] (ref 5–8)
PLATELET # BLD AUTO: 245 K/UL (ref 150–400)
PMV BLD AUTO: ABNORMAL FL (ref 8.9–12.9)
POTASSIUM SERPL-SCNC: 4.8 MMOL/L (ref 3.5–5.1)
PROT UR STRIP-MCNC: NEGATIVE MG/DL
RBC # BLD AUTO: 3.92 M/UL (ref 3.8–5.2)
RBC #/AREA URNS HPF: ABNORMAL /HPF (ref 0–5)
RBC MORPH BLD: ABNORMAL
SERVICE CMNT-IMP: ABNORMAL
SERVICE CMNT-IMP: ABNORMAL
SODIUM SERPL-SCNC: 136 MMOL/L (ref 136–145)
SP GR UR REFRACTOMETRY: 1.02 (ref 1–1.03)
SPECIMEN EXP DATE BLD: NORMAL
STATUS OF UNIT,%ST: NORMAL
T4 FREE SERPL-MCNC: 1.5 NG/DL (ref 0.8–1.5)
UA: UC IF INDICATED,UAUC: ABNORMAL
UNIT DIVISION, %UDIV: 0
UROBILINOGEN UR QL STRIP.AUTO: 0.2 EU/DL (ref 0.2–1)
WBC # BLD AUTO: 10.2 K/UL (ref 3.6–11)
WBC URNS QL MICRO: ABNORMAL /HPF (ref 0–4)
YEAST URNS QL MICRO: PRESENT

## 2019-03-28 PROCEDURE — 74011250636 HC RX REV CODE- 250/636: Performed by: INTERNAL MEDICINE

## 2019-03-28 PROCEDURE — 74011250637 HC RX REV CODE- 250/637: Performed by: INTERNAL MEDICINE

## 2019-03-28 PROCEDURE — 74011636637 HC RX REV CODE- 636/637: Performed by: INTERNAL MEDICINE

## 2019-03-28 PROCEDURE — 84439 ASSAY OF FREE THYROXINE: CPT

## 2019-03-28 PROCEDURE — 83735 ASSAY OF MAGNESIUM: CPT

## 2019-03-28 PROCEDURE — 74011250637 HC RX REV CODE- 250/637: Performed by: FAMILY MEDICINE

## 2019-03-28 PROCEDURE — 81001 URINALYSIS AUTO W/SCOPE: CPT

## 2019-03-28 PROCEDURE — 74011000258 HC RX REV CODE- 258: Performed by: INTERNAL MEDICINE

## 2019-03-28 PROCEDURE — 85025 COMPLETE CBC W/AUTO DIFF WBC: CPT

## 2019-03-28 PROCEDURE — 94760 N-INVAS EAR/PLS OXIMETRY 1: CPT

## 2019-03-28 PROCEDURE — 80048 BASIC METABOLIC PNL TOTAL CA: CPT

## 2019-03-28 PROCEDURE — 82962 GLUCOSE BLOOD TEST: CPT

## 2019-03-28 PROCEDURE — 36415 COLL VENOUS BLD VENIPUNCTURE: CPT

## 2019-03-28 PROCEDURE — 74011250637 HC RX REV CODE- 250/637: Performed by: NURSE PRACTITIONER

## 2019-03-28 RX ORDER — POLYETHYLENE GLYCOL 3350 17 G/17G
17 POWDER, FOR SOLUTION ORAL DAILY
Qty: 30 PACKET | Refills: 0 | Status: SHIPPED | OUTPATIENT
Start: 2019-03-29

## 2019-03-28 RX ORDER — DEXTROMETHORPHAN POLISTIREX 30 MG/5 ML
SUSPENSION, EXTENDED RELEASE 12 HR ORAL
Status: DISCONTINUED | OUTPATIENT
Start: 2019-03-28 | End: 2019-03-28 | Stop reason: HOSPADM

## 2019-03-28 RX ORDER — INSULIN LISPRO 100 [IU]/ML
10 INJECTION, SOLUTION INTRAVENOUS; SUBCUTANEOUS ONCE
Status: COMPLETED | OUTPATIENT
Start: 2019-03-28 | End: 2019-03-28

## 2019-03-28 RX ORDER — LANOLIN ALCOHOL/MO/W.PET/CERES
325 CREAM (GRAM) TOPICAL
Qty: 30 TAB | Refills: 0 | Status: SHIPPED | OUTPATIENT
Start: 2019-03-29

## 2019-03-28 RX ORDER — FACIAL-BODY WIPES
10 EACH TOPICAL
Status: COMPLETED | OUTPATIENT
Start: 2019-03-28 | End: 2019-03-28

## 2019-03-28 RX ORDER — AMLODIPINE BESYLATE 10 MG/1
10 TABLET ORAL DAILY
Qty: 30 TAB | Refills: 0 | Status: SHIPPED | OUTPATIENT
Start: 2019-03-29

## 2019-03-28 RX ORDER — AZITHROMYCIN 250 MG/1
250 TABLET, FILM COATED ORAL DAILY
Qty: 1 TAB | Refills: 0 | Status: SHIPPED | OUTPATIENT
Start: 2019-03-28 | End: 2019-03-29

## 2019-03-28 RX ORDER — TRAMADOL HYDROCHLORIDE 50 MG/1
50 TABLET ORAL
Qty: 10 TAB | Refills: 0 | Status: SHIPPED | OUTPATIENT
Start: 2019-03-28 | End: 2019-03-31

## 2019-03-28 RX ORDER — INSULIN GLARGINE 100 [IU]/ML
30 INJECTION, SOLUTION SUBCUTANEOUS 2 TIMES DAILY
Qty: 1 ADJUSTABLE DOSE PRE-FILLED PEN SYRINGE | Refills: 0 | Status: SHIPPED | OUTPATIENT
Start: 2019-03-28

## 2019-03-28 RX ADMIN — INSULIN LISPRO 10 UNITS: 100 INJECTION, SOLUTION INTRAVENOUS; SUBCUTANEOUS at 12:29

## 2019-03-28 RX ADMIN — ATORVASTATIN CALCIUM 20 MG: 20 TABLET, FILM COATED ORAL at 09:23

## 2019-03-28 RX ADMIN — AMLODIPINE BESYLATE 10 MG: 5 TABLET ORAL at 09:26

## 2019-03-28 RX ADMIN — PREGABALIN 150 MG: 75 CAPSULE ORAL at 09:24

## 2019-03-28 RX ADMIN — LACTULOSE 20 G: 20 SOLUTION ORAL at 09:28

## 2019-03-28 RX ADMIN — POTASSIUM CHLORIDE 10 MEQ: 750 TABLET, EXTENDED RELEASE ORAL at 09:27

## 2019-03-28 RX ADMIN — POLYETHYLENE GLYCOL 3350 17 G: 17 POWDER, FOR SOLUTION ORAL at 09:13

## 2019-03-28 RX ADMIN — INSULIN LISPRO 5 UNITS: 100 INJECTION, SOLUTION INTRAVENOUS; SUBCUTANEOUS at 09:19

## 2019-03-28 RX ADMIN — FERROUS SULFATE TAB 325 MG (65 MG ELEMENTAL FE) 325 MG: 325 (65 FE) TAB at 09:25

## 2019-03-28 RX ADMIN — Medication 1 CAPSULE: at 09:27

## 2019-03-28 RX ADMIN — LEVOTHYROXINE SODIUM 175 MCG: 150 TABLET ORAL at 06:42

## 2019-03-28 RX ADMIN — AZITHROMYCIN 250 MG: 250 TABLET, FILM COATED ORAL at 09:27

## 2019-03-28 RX ADMIN — CARVEDILOL 25 MG: 12.5 TABLET, FILM COATED ORAL at 09:25

## 2019-03-28 RX ADMIN — CEFTRIAXONE 1 G: 1 INJECTION, POWDER, FOR SOLUTION INTRAMUSCULAR; INTRAVENOUS at 09:56

## 2019-03-28 RX ADMIN — INSULIN GLARGINE 25 UNITS: 100 INJECTION, SOLUTION SUBCUTANEOUS at 09:20

## 2019-03-28 RX ADMIN — Medication 10 ML: at 06:42

## 2019-03-28 RX ADMIN — BISACODYL 10 MG: 10 SUPPOSITORY RECTAL at 09:28

## 2019-03-28 RX ADMIN — TRAMADOL HYDROCHLORIDE 50 MG: 50 TABLET, FILM COATED ORAL at 09:28

## 2019-03-28 NOTE — FACE TO FACE
Home Health Care Discharge Planning: [facility] Face to Face Encounter NAME: Ariane Foss :  1935 MRN:  [id number] Primary Diagnosis:  
Acute hypoxemic respiratory failure ([address] (3/23/2019) CAP Lung nodule in right mid lung Hypertensive urgency Acute CHF (congestive heart failure) exacerbation (HCC) (3/23/2019)  HTN Hx of CAD Dyslipidemia R knee pain with effusion and pseudogout Date of Face to Face:  3/28/2019 3:48 PM        
                        
Face to Face Encounter findings are related to primary reason for home care:   YES 
 
1. I certify that the patient needs intermittent skilled nursing care, physical therapy and/or speech therapy. I will not be following this patient in the Community and Dr. Jose Santana, NP will be responsible for signing the [address] 2. Initial Orders for Care: Physical Therapy 3. I certify that this patient is homebound because of illness or injury, need the aid of supportive devices such as crutches, canes, wheelchairs, and walkers; the use of special transportation; or the assistance of another person in order to leave their place of residence. There exists a normal inability to leave home and leaving home requires a considerable and taxing effort. 4. I certify that this patient is under my care and that I had a Face-to-Face Encounter that meets the physician Face-to-Face Encounter requirements. Document the physical findings from the [address] [place] [address] Encounter that support the need for skilled services: Has new finding of weakness and altered mobility that requires skilled physical/occupational and/or speech therapy services for evaluation and interventions. Marcy Boyer MD 
Discharging Physician Office:  [phone] Fax:    [phone]

## 2019-03-28 NOTE — ACP (ADVANCE CARE PLANNING)
Primary Decision Maker: Nilesh Seth - Sister - [phone]    Secondary Decision Maker: Shady Reyes - Other Relative - [phone]    Supplemental (Other) Decision Maker: Abida, [address] [facility] Planning 3/28/2019   Patient's Healthcare Decision Maker is: Named in scanned ACP document   Confirm Advance Directive Yes, on file   Patient Would Like to Complete Advance Directive -   Does the patient have other document types Do Not Resuscitate       Luci Garcia is alert, oriented and able to make decisions about her healthcare. She is stable medically, awaiting discharge today. She has had two previous conversations with the Palliative treatment team about her AMD and mPOA. She was going to \"think about it\" and then complete this documentation. In conversation today, patient understands that this document names someone who can speak on patient's behalf if she is unable to make decisions or share information to her treatment team about her wishes. She named the family members noted above as her decision makers. She lives with another son, but did not want him to be mPOA as Philip Taylor is in and out\"; seems he is not reliable. Patient made it clear she does not want life prolonging measures to be kept alive if she is imminent or unconscious with little to no chance of recovery. She is not an organ donor. Discussed Code status with patient. She understands this and noted she wants to [facility] when my time comes, we all have got to go sometime\". Patient signed a DDNR order. Copies of all documents made for hard chart and given to patient for herself and her named mPOAs.

## 2019-03-28 NOTE — PROGRESS NOTES
Discussed patient's DC meds and follow up. She understands instructions and will follow up with  and also get lab work. She is discharged

## 2019-03-28 NOTE — PROGRESS NOTES
Problem: Pressure Injury - Risk of 
Goal: *Prevention of pressure injury Description Document Ayaan Scale and appropriate interventions in the flowsheet. Outcome: Progressing Towards Goal 
  
Problem: Patient Education: Go to Patient Education Activity Goal: Patient/Family Education Outcome: Progressing Towards Goal

## 2019-03-28 NOTE — DISCHARGE SUMMARY
Hospitalist Discharge Summary     Patient ID:  Johanna Trevizo  [id number]  80 y.o.  1935    PCP on record: Ilda Wilhelm NP    Admit date: 3/23/2019  Discharge date and time: 3/28/2019      DISCHARGE DIAGNOSIS:    Acute hypoxemic respiratory failure ([facility] (3/23/2019)  CAP   Lung nodule in right mid lung   Lower extremity pain  Arthritis  Hypertensive urgency   Acute CHF (congestive heart failure) exacerbation (HCC) (3/23/2019)   HTN  Hx of CAD  Dyslipidemia  DM (diabetes mellitus) ([facility] (3/23/2019)  Iron deficient anemia  ZACK on CKD  Constipation  Hypothyroidism  Obesity   Body mass index is 42.76 kg/m². CONSULTATIONS:  IP CONSULT TO CARDIOLOGY  IP CONSULT TO PALLIATIVE CARE - PROVIDER  IP CONSULT TO PULMONOLOGY  IP CONSULT TO ORTHOPEDIC SURGERY    Excerpted HPI from H&P of Jade Park MD:  Gisell Perera is a 80 y.o.  female with PMH of DM, who presents with worsening shortness of breath. Patient cannot give detailed history as on BiPaP, she call Nephew for worsening dyspnea early this morning, she was doing ok last night, progressively worse and severe  when called EMS. Patient has history of CAD and MI in 2004, lives in [facility] and see cardiology in [place], she see her PCP for DM and HTN management, no history of stroke of cancer, no stent, deneis any chest pain, no flu like symptoms, no lower extremity swelling, taking all meds as prescribed, DM fairly controlled. Patient lives and walks with cane son visit her frequently,    In ER patient hypoxic, required BiPaP CXR with pulmonary edema give Lasix, significant improvement  We were asked to admit for work up and evaluation of the above problems        ______________________________________________________________________  DISCHARGE SUMMARY/HOSPITAL COURSE:  for full details see H&P, daily progress notes, labs, consult notes.    Acute hypoxemic respiratory failure ([facility] [address] (3/23/2019)  CAP   Lung nodule in right mid lung   - CT of chest: Nodule 1.7 x 1.3 cm nodule right lower lobe along the oblique fissure with  bilateral airspace disease most confluent at the right lung base and septal lobular thickening and right pleural effusion. No comment on presence of PE  - appreciate pulmonology input; continue with Ceftriaxone and zithromax    RA O2 Sat 99%    Repeat CT in 4-6 weeks per pulmology team; Lesion not amenable to bronchoscopy or needle aspirate; tough location- explained to pt and she understands     Lower extremity pain  Arthritis  - duplex study (-) DVT  - right knee x-ray: Tricompartmental erosive arthritis, most pronounced in the  patellofemoral compartment, most suggestive of CPPD arthropathy. Appreciate Ortho input; s/p arthrocentesis. Pt voices feeling much better. Continue with tylenol or ultram PRN for pain  - follow up with ortho as outpatient     Hypertensive urgency   Acute CHF (congestive heart failure) exacerbation (HCC) (3/23/2019)   HTN  Hx of CAD  Dyslipidemia  - continue with ASA, Coreg, and amlodipine (dose increased in am). ACEi on hold due to worsening of renal function  - will on lasix today due to ZACK   Daily Weights: Notify MD for a weight gain of 3 pounds or greater in one day or 5 pounds in one week. Weight upon discharge 248lbs    fluid restriction 1200ml    HCTZ changed to lasix per cardiology 3/25 which has been on hold due to ZACK. Pt has been advised to follow up with her pcp, get BMP check to evaluate for resuming HCTZ  - minimal elevation of NT pro-  - Echo:EF 60%, mild MR, mod pHTN   - continue with statin     DM (diabetes mellitus) ([address] (3/23/2019)  - A1C 6.3. Continue with diabetic diet  - continue with Lantus, check blood glucose before breakfast and at bedtime     Iron deficient anemia  - heme (-) stool (3/24).      Iron 8, TIBC 278, Iron sat 3%, Ferritin 33    Folate & vit B 12 within normal range  - continue with Iron supplement  - Hgb 6.8; transfuse 1 unit of pRBC. May need lasix after the transfusion. hgb 8.3 upon discharge     ZACK on CKD  - creat 1.70. Avoid nephrotoxic agents. - ZACK secondary over diuresing     Constipation  - start on daily miralax and colace     Hypothyroidism  - continue with levothyroxine     Obesity   Body mass index is 42.76 kg/m².              _______________________________________________________________________  Patient seen and examined by me on discharge day. Pertinent Findings:  Gen:    Not in distress  Chest: Clear in apex with decreased breath sounds at bases  CVS:   Regular rhythm. No edema  Abd:  Soft, not distended, not tender  Neuro:  Alert, orient x 4  _______________________________________________________________________  DISCHARGE MEDICATIONS:   Current Discharge Medication List      START taking these medications    Details   azithromycin (ZITHROMAX) 250 mg tablet Take 1 Tab by mouth daily for 1 day. Qty: 1 Tab, Refills: 0      ferrous sulfate 325 mg (65 mg iron) tablet Take 1 Tab by mouth daily (with breakfast). Qty: 30 Tab, Refills: 0      L. acidoph & paracasei- S therm- Bifido (BRIGHT-Q/RISAQUAD) 8 billion cell cap cap Take 1 Cap by mouth daily. Qty: 2 Cap, Refills: 0      polyethylene glycol (MIRALAX) 17 gram packet Take 1 Packet by mouth daily. Qty: 30 Packet, Refills: 0      traMADol (ULTRAM) 50 mg tablet Take 1 Tab by mouth every six (6) hours as needed for Pain for up to 3 days. Max Daily Amount: 200 mg. Qty: 10 Tab, Refills: 0    Associated Diagnoses: Arthritis         CONTINUE these medications which have CHANGED    Details   amLODIPine (NORVASC) 10 mg tablet Take 1 Tab by mouth daily. Qty: 30 Tab, Refills: 0      insulin glargine (LANTUS SOLOSTAR U-100 INSULIN) 100 unit/mL (3 mL) inpn 30 Units by SubCUTAneous route two (2) times a day.   Qty: 1 Adjustable Dose Pre-filled Pen Syringe, Refills: 0         CONTINUE these medications which have NOT CHANGED    Details   pregabalin (LYRICA) 150 mg capsule Take  by mouth two (2) times a day. Indications: Diabetic Complication causing Injury to some Body Nerves      carvedilol (COREG) 25 mg tablet Take 25 mg by mouth two (2) times daily (with meals). diclofenac (VOLTAREN) 1 % gel Apply 2 g to affected area four (4) times daily. Qty: 100 g, Refills: 0      atorvastatin (LIPITOR) 20 mg tablet Take 20 mg by mouth daily. levothyroxine (SYNTHROID) 175 mcg tablet Take 175 mcg by mouth Daily (before breakfast). aspirin delayed-release 81 mg tablet Take 81 mg by mouth daily. STOP taking these medications       lisinopril (PRINIVIL, ZESTRIL) 40 mg tablet Comments:   Reason for Stopping:         hydroCHLOROthiazide (HYDRODIURIL) 25 mg tablet Comments:   Reason for Stopping:         oxyCODONE IR (ROXICODONE) 5 mg immediate release tablet Comments:   Reason for Stopping:               My Recommended Diet, Activity, Wound Care, and follow-up labs are listed in the patient's Discharge Insturctions which I have personally completed and reviewed. _______________________________________________________________________  DISPOSITION:    Home with Family:    Home with /PT/OT/RN: y   SNF/LTC:    RAFAEL:    OTHER:        Condition at Discharge:  Stable  _______________________________________________________________________  Follow up with:   PCP : Juan Hutchison NP  Follow-up Information     Follow up With Specialties Details Why [address] [facility]  PT, OT and Nursing services [facility] [phone] [facility]    Juan Hutchison NP Nurse Practitioner Go on 3/29/2019 needs BMP at f/u.   For hospital follow up appointment at 2:00PM  [address] [address]  [phone]      Celia Basilio MD Internal Medicine, Pulmonary Disease On 4/4/2019 your appt is at 9:45am - Ronald Fuentes NP  [address] [employer] [place]  [address] [facility], [address] [place] [address], DO Orthopedic Surgery On 4/16/2019 your appt is 10:10am, call office first to make sure they take her insurance  [address]  [address] [place] [address] [address]  [address] [phone]                Total time in minutes spent coordinating this discharge (includes going over instructions, follow-up, prescriptions, and preparing report for sign off to her PCP) :  40 minutes    Signed:  Laura Santiago NP

## 2019-03-28 NOTE — PROGRESS NOTES
Pt will receive home health PT, OT and Nursing services from [address] [place] [address]. F/u appts made and documented on the discharge paperwork. Pt's family will transport pt home by car. Care Management Interventions PCP Verified by CM: Eugenio Ronquillo NP) Palliative Care Criteria Met (RRAT>21 & CHF Dx)?: Yes 
Palliative Consult Recommended?: Yes Mode of Transport at Discharge: Other (see comment)(family transporting pt home by car) Transition of Care Consult (CM Consult): Discharge Planning, Home Health [address] [place] [address] Road: Yes Discharge Durable Medical Equipment: No 
Physical Therapy Consult: Yes Occupational Therapy Consult: Yes Speech Therapy Consult: No 
Current Support Network: Own Home, Other(lives with her son in a 1 story home with 3 steps to the entrance) Confirm Follow Up Transport: Family Plan discussed with Pt/Family/Caregiver: Yes Freedom of Choice Offered: Yes Discharge Location Discharge Placement: Home with home health [place], [address] [facility]

## 2019-03-28 NOTE — PROGRESS NOTES
0700: Bedside shift change report given to SHYLA Ballesteros RN (oncoming nurse) by US Hernandez RN (offgoing nurse). Report included the following information SBAR and Kardex. 0800: Spoke in NP, NP orders put in to facilitate BM.

## 2019-03-28 NOTE — PROGRESS NOTES
Problem: Falls - Risk of 
Goal: *Absence of Falls Description Document Micaela Bynum Fall Risk and appropriate interventions in the flowsheet.  
Outcome: Progressing Towards Goal

## 2019-04-01 LAB
BACTERIA SPEC CULT: NORMAL
SERVICE CMNT-IMP: NORMAL

## 2019-04-02 ENCOUNTER — HOME CARE VISIT (OUTPATIENT)
Dept: SCHEDULING | Facility: HOME HEALTH | Age: 84
End: 2019-04-02
Payer: MEDICARE

## 2019-04-02 VITALS
DIASTOLIC BLOOD PRESSURE: 82 MMHG | HEART RATE: 60 BPM | TEMPERATURE: 98 F | RESPIRATION RATE: 18 BRPM | SYSTOLIC BLOOD PRESSURE: 128 MMHG | OXYGEN SATURATION: 99 %

## 2019-04-02 PROCEDURE — G0299 HHS/HOSPICE OF RN EA 15 MIN: HCPCS

## 2019-04-02 PROCEDURE — 400013 HH SOC

## 2019-04-05 ENCOUNTER — HOME CARE VISIT (OUTPATIENT)
Dept: SCHEDULING | Facility: HOME HEALTH | Age: 84
End: 2019-04-05
Payer: MEDICARE

## 2019-04-05 PROCEDURE — G0300 HHS/HOSPICE OF LPN EA 15 MIN: HCPCS

## 2019-04-07 VITALS
TEMPERATURE: 99 F | RESPIRATION RATE: 20 BRPM | HEART RATE: 62 BPM | SYSTOLIC BLOOD PRESSURE: 134 MMHG | DIASTOLIC BLOOD PRESSURE: 76 MMHG | OXYGEN SATURATION: 98 %

## 2019-04-08 ENCOUNTER — HOME CARE VISIT (OUTPATIENT)
Dept: SCHEDULING | Facility: HOME HEALTH | Age: 84
End: 2019-04-08
Payer: MEDICARE

## 2019-04-08 VITALS
HEART RATE: 66 BPM | SYSTOLIC BLOOD PRESSURE: 140 MMHG | TEMPERATURE: 98.6 F | OXYGEN SATURATION: 97 % | DIASTOLIC BLOOD PRESSURE: 72 MMHG

## 2019-04-08 PROCEDURE — G0151 HHCP-SERV OF PT,EA 15 MIN: HCPCS

## 2019-04-08 PROCEDURE — G0299 HHS/HOSPICE OF RN EA 15 MIN: HCPCS

## 2019-04-09 ENCOUNTER — HOME CARE VISIT (OUTPATIENT)
Dept: SCHEDULING | Facility: HOME HEALTH | Age: 84
End: 2019-04-09
Payer: MEDICARE

## 2019-04-09 VITALS
RESPIRATION RATE: 18 BRPM | SYSTOLIC BLOOD PRESSURE: 136 MMHG | HEART RATE: 58 BPM | OXYGEN SATURATION: 97 % | TEMPERATURE: 97.8 F | DIASTOLIC BLOOD PRESSURE: 72 MMHG

## 2019-04-09 VITALS — SYSTOLIC BLOOD PRESSURE: 160 MMHG | OXYGEN SATURATION: 99 % | HEART RATE: 65 BPM | DIASTOLIC BLOOD PRESSURE: 74 MMHG

## 2019-04-09 LAB
BACTERIA SPEC CULT: NORMAL
BACTERIA SPEC CULT: NORMAL
GRAM STN SPEC: NORMAL
GRAM STN SPEC: NORMAL
SERVICE CMNT-IMP: NORMAL

## 2019-04-09 PROCEDURE — G0152 HHCP-SERV OF OT,EA 15 MIN: HCPCS

## 2019-04-11 ENCOUNTER — HOME CARE VISIT (OUTPATIENT)
Dept: SCHEDULING | Facility: HOME HEALTH | Age: 84
End: 2019-04-11
Payer: MEDICARE

## 2019-04-11 ENCOUNTER — HOME CARE VISIT (OUTPATIENT)
Dept: HOME HEALTH SERVICES | Facility: HOME HEALTH | Age: 84
End: 2019-04-11
Payer: MEDICARE

## 2019-04-11 PROCEDURE — G0300 HHS/HOSPICE OF LPN EA 15 MIN: HCPCS

## 2019-04-12 ENCOUNTER — HOME CARE VISIT (OUTPATIENT)
Dept: SCHEDULING | Facility: HOME HEALTH | Age: 84
End: 2019-04-12
Payer: MEDICARE

## 2019-04-12 VITALS — DIASTOLIC BLOOD PRESSURE: 80 MMHG | SYSTOLIC BLOOD PRESSURE: 150 MMHG | OXYGEN SATURATION: 98 % | HEART RATE: 83 BPM

## 2019-04-12 PROCEDURE — G0152 HHCP-SERV OF OT,EA 15 MIN: HCPCS

## 2019-04-15 ENCOUNTER — HOME CARE VISIT (OUTPATIENT)
Dept: HOME HEALTH SERVICES | Facility: HOME HEALTH | Age: 84
End: 2019-04-15
Payer: MEDICARE

## 2019-04-15 VITALS
DIASTOLIC BLOOD PRESSURE: 82 MMHG | RESPIRATION RATE: 18 BRPM | TEMPERATURE: 98.6 F | HEART RATE: 66 BPM | OXYGEN SATURATION: 99 % | SYSTOLIC BLOOD PRESSURE: 142 MMHG

## 2019-04-16 ENCOUNTER — HOME CARE VISIT (OUTPATIENT)
Dept: SCHEDULING | Facility: HOME HEALTH | Age: 84
End: 2019-04-16
Payer: MEDICARE

## 2019-04-17 ENCOUNTER — HOME CARE VISIT (OUTPATIENT)
Dept: SCHEDULING | Facility: HOME HEALTH | Age: 84
End: 2019-04-17
Payer: MEDICARE

## 2019-04-17 PROCEDURE — G0152 HHCP-SERV OF OT,EA 15 MIN: HCPCS

## 2019-04-18 ENCOUNTER — HOME CARE VISIT (OUTPATIENT)
Dept: SCHEDULING | Facility: HOME HEALTH | Age: 84
End: 2019-04-18
Payer: MEDICARE

## 2019-04-18 ENCOUNTER — HOME CARE VISIT (OUTPATIENT)
Dept: HOME HEALTH SERVICES | Facility: HOME HEALTH | Age: 84
End: 2019-04-18
Payer: MEDICARE

## 2019-04-18 VITALS
HEART RATE: 60 BPM | SYSTOLIC BLOOD PRESSURE: 122 MMHG | TEMPERATURE: 97.8 F | RESPIRATION RATE: 18 BRPM | OXYGEN SATURATION: 99 % | DIASTOLIC BLOOD PRESSURE: 68 MMHG

## 2019-04-18 VITALS — DIASTOLIC BLOOD PRESSURE: 70 MMHG | HEART RATE: 63 BPM | SYSTOLIC BLOOD PRESSURE: 152 MMHG | OXYGEN SATURATION: 99 %

## 2019-04-18 VITALS — HEART RATE: 69 BPM | DIASTOLIC BLOOD PRESSURE: 60 MMHG | OXYGEN SATURATION: 98 % | SYSTOLIC BLOOD PRESSURE: 160 MMHG

## 2019-04-18 PROCEDURE — G0157 HHC PT ASSISTANT EA 15: HCPCS

## 2019-04-18 PROCEDURE — G0299 HHS/HOSPICE OF RN EA 15 MIN: HCPCS

## 2019-04-19 ENCOUNTER — HOME CARE VISIT (OUTPATIENT)
Dept: SCHEDULING | Facility: HOME HEALTH | Age: 84
End: 2019-04-19
Payer: MEDICARE

## 2019-04-19 VITALS — SYSTOLIC BLOOD PRESSURE: 150 MMHG | OXYGEN SATURATION: 98 % | DIASTOLIC BLOOD PRESSURE: 73 MMHG | HEART RATE: 61 BPM

## 2019-04-19 PROCEDURE — G0157 HHC PT ASSISTANT EA 15: HCPCS

## 2019-04-22 ENCOUNTER — HOME CARE VISIT (OUTPATIENT)
Dept: SCHEDULING | Facility: HOME HEALTH | Age: 84
End: 2019-04-22
Payer: MEDICARE

## 2019-04-22 VITALS
HEART RATE: 62 BPM | SYSTOLIC BLOOD PRESSURE: 146 MMHG | OXYGEN SATURATION: 98 % | TEMPERATURE: 97.5 F | DIASTOLIC BLOOD PRESSURE: 69 MMHG

## 2019-04-22 PROCEDURE — G0157 HHC PT ASSISTANT EA 15: HCPCS

## 2019-04-24 ENCOUNTER — HOME CARE VISIT (OUTPATIENT)
Dept: SCHEDULING | Facility: HOME HEALTH | Age: 84
End: 2019-04-24
Payer: MEDICARE

## 2019-04-24 VITALS
BODY MASS INDEX: 47.92 KG/M2 | SYSTOLIC BLOOD PRESSURE: 134 MMHG | OXYGEN SATURATION: 95 % | HEART RATE: 66 BPM | TEMPERATURE: 98.4 F | RESPIRATION RATE: 18 BRPM | DIASTOLIC BLOOD PRESSURE: 76 MMHG | WEIGHT: 279.2 LBS

## 2019-04-24 VITALS — SYSTOLIC BLOOD PRESSURE: 160 MMHG | DIASTOLIC BLOOD PRESSURE: 70 MMHG | HEART RATE: 65 BPM | OXYGEN SATURATION: 98 %

## 2019-04-24 PROCEDURE — G0299 HHS/HOSPICE OF RN EA 15 MIN: HCPCS

## 2019-04-24 PROCEDURE — G0152 HHCP-SERV OF OT,EA 15 MIN: HCPCS

## 2019-04-25 ENCOUNTER — HOME CARE VISIT (OUTPATIENT)
Dept: HOME HEALTH SERVICES | Facility: HOME HEALTH | Age: 84
End: 2019-04-25
Payer: MEDICARE

## 2019-04-26 ENCOUNTER — HOME CARE VISIT (OUTPATIENT)
Dept: SCHEDULING | Facility: HOME HEALTH | Age: 84
End: 2019-04-26
Payer: MEDICARE

## 2019-04-26 PROCEDURE — G0152 HHCP-SERV OF OT,EA 15 MIN: HCPCS

## 2019-04-27 VITALS
SYSTOLIC BLOOD PRESSURE: 160 MMHG | WEIGHT: 272.4 LBS | BODY MASS INDEX: 46.76 KG/M2 | DIASTOLIC BLOOD PRESSURE: 80 MMHG | HEART RATE: 75 BPM | OXYGEN SATURATION: 96 %

## 2019-05-01 ENCOUNTER — HOME CARE VISIT (OUTPATIENT)
Dept: SCHEDULING | Facility: HOME HEALTH | Age: 84
End: 2019-05-01
Payer: MEDICARE

## 2019-05-01 VITALS
TEMPERATURE: 98.7 F | DIASTOLIC BLOOD PRESSURE: 70 MMHG | OXYGEN SATURATION: 98 % | BODY MASS INDEX: 28.49 KG/M2 | SYSTOLIC BLOOD PRESSURE: 140 MMHG | HEART RATE: 78 BPM | WEIGHT: 166 LBS

## 2019-05-01 VITALS
TEMPERATURE: 98.2 F | WEIGHT: 266 LBS | RESPIRATION RATE: 18 BRPM | OXYGEN SATURATION: 98 % | DIASTOLIC BLOOD PRESSURE: 72 MMHG | SYSTOLIC BLOOD PRESSURE: 138 MMHG | HEART RATE: 60 BPM | BODY MASS INDEX: 45.66 KG/M2

## 2019-05-01 PROCEDURE — G0299 HHS/HOSPICE OF RN EA 15 MIN: HCPCS

## 2019-05-01 PROCEDURE — G0157 HHC PT ASSISTANT EA 15: HCPCS

## 2019-05-02 ENCOUNTER — HOME CARE VISIT (OUTPATIENT)
Dept: SCHEDULING | Facility: HOME HEALTH | Age: 84
End: 2019-05-02
Payer: MEDICARE

## 2019-05-02 PROCEDURE — G0151 HHCP-SERV OF PT,EA 15 MIN: HCPCS

## 2019-05-03 VITALS
DIASTOLIC BLOOD PRESSURE: 58 MMHG | OXYGEN SATURATION: 97 % | RESPIRATION RATE: 16 BRPM | TEMPERATURE: 98.2 F | SYSTOLIC BLOOD PRESSURE: 128 MMHG | HEART RATE: 60 BPM

## 2019-05-08 ENCOUNTER — HOME CARE VISIT (OUTPATIENT)
Dept: SCHEDULING | Facility: HOME HEALTH | Age: 84
End: 2019-05-08
Payer: MEDICARE

## 2019-05-08 VITALS
OXYGEN SATURATION: 97 % | BODY MASS INDEX: 44.46 KG/M2 | DIASTOLIC BLOOD PRESSURE: 80 MMHG | SYSTOLIC BLOOD PRESSURE: 160 MMHG | HEART RATE: 79 BPM | WEIGHT: 259 LBS

## 2019-05-08 PROCEDURE — G0152 HHCP-SERV OF OT,EA 15 MIN: HCPCS

## 2019-05-09 ENCOUNTER — HOME CARE VISIT (OUTPATIENT)
Dept: SCHEDULING | Facility: HOME HEALTH | Age: 84
End: 2019-05-09
Payer: MEDICARE

## 2019-05-09 VITALS
DIASTOLIC BLOOD PRESSURE: 80 MMHG | SYSTOLIC BLOOD PRESSURE: 138 MMHG | WEIGHT: 259 LBS | BODY MASS INDEX: 44.46 KG/M2 | OXYGEN SATURATION: 99 % | HEART RATE: 56 BPM | TEMPERATURE: 98.2 F | RESPIRATION RATE: 18 BRPM

## 2019-05-09 PROCEDURE — G0299 HHS/HOSPICE OF RN EA 15 MIN: HCPCS

## 2019-05-10 ENCOUNTER — HOSPITAL ENCOUNTER (OUTPATIENT)
Dept: CT IMAGING | Age: 84
Discharge: HOME | End: 2019-05-10
Attending: NURSE PRACTITIONER
Payer: MEDICARE

## 2019-05-10 DIAGNOSIS — R91.1 PULMONARY NODULE: ICD-10-CM

## 2019-05-10 PROCEDURE — 71250 CT THORAX DX C-: CPT

## 2019-05-15 ENCOUNTER — HOME CARE VISIT (OUTPATIENT)
Dept: SCHEDULING | Facility: HOME HEALTH | Age: 84
End: 2019-05-15
Payer: MEDICARE

## 2019-05-15 VITALS
DIASTOLIC BLOOD PRESSURE: 80 MMHG | TEMPERATURE: 98.5 F | SYSTOLIC BLOOD PRESSURE: 128 MMHG | WEIGHT: 260.2 LBS | BODY MASS INDEX: 44.66 KG/M2 | HEART RATE: 62 BPM | OXYGEN SATURATION: 99 % | RESPIRATION RATE: 18 BRPM

## 2019-05-15 PROCEDURE — G0299 HHS/HOSPICE OF RN EA 15 MIN: HCPCS

## 2021-08-03 PROBLEM — I10 HTN (HYPERTENSION): Status: RESOLVED | Noted: 2019-03-24 | Resolved: 2021-08-03

## 2022-03-18 PROBLEM — N18.9 CHRONIC RENAL INSUFFICIENCY: Status: ACTIVE | Noted: 2019-03-24

## 2022-03-19 PROBLEM — D64.9 ANEMIA: Status: ACTIVE | Noted: 2019-03-24

## 2022-03-19 PROBLEM — I50.9 CHF (CONGESTIVE HEART FAILURE) (HCC): Status: ACTIVE | Noted: 2019-03-23

## 2022-03-19 PROBLEM — E78.5 HYPERLIPIDEMIA: Status: ACTIVE | Noted: 2019-03-24

## 2022-03-19 PROBLEM — E11.9 DM (DIABETES MELLITUS) (HCC): Status: ACTIVE | Noted: 2019-03-23

## 2022-03-19 PROBLEM — J96.01 ACUTE HYPOXEMIC RESPIRATORY FAILURE (HCC): Status: ACTIVE | Noted: 2019-03-23

## 2024-02-06 NOTE — PERIOP NOTE
Spoke to patient today, she asked me to call her daughter sarah to schedule PAT APPT  @ [phone].  I left a v.mail message for a call back to schedule PAT APPT.

## 2024-02-23 ENCOUNTER — HOSPITAL ENCOUNTER (OUTPATIENT)
Facility: HOSPITAL | Age: 89
End: 2024-02-23
Payer: MEDICARE

## 2024-02-23 VITALS
BODY MASS INDEX: 41.46 KG/M2 | SYSTOLIC BLOOD PRESSURE: 174 MMHG | TEMPERATURE: 97.8 F | WEIGHT: 234 LBS | DIASTOLIC BLOOD PRESSURE: 67 MMHG | HEART RATE: 65 BPM | HEIGHT: 63 IN

## 2024-02-23 LAB
ANION GAP SERPL CALC-SCNC: 5 MMOL/L (ref 5–15)
BASOPHILS # BLD: 0.1 K/UL (ref 0–0.1)
BASOPHILS NFR BLD: 1 % (ref 0–1)
BUN SERPL-MCNC: 52 MG/DL (ref 6–20)
BUN/CREAT SERPL: 29 (ref 12–20)
CALCIUM SERPL-MCNC: 9.3 MG/DL (ref 8.5–10.1)
CHLORIDE SERPL-SCNC: 113 MMOL/L (ref 97–108)
CO2 SERPL-SCNC: 24 MMOL/L (ref 21–32)
CREAT SERPL-MCNC: 1.8 MG/DL (ref 0.55–1.02)
DIFFERENTIAL METHOD BLD: ABNORMAL
EOSINOPHIL # BLD: 0.1 K/UL (ref 0–0.4)
EOSINOPHIL NFR BLD: 2 % (ref 0–7)
ERYTHROCYTE [DISTWIDTH] IN BLOOD BY AUTOMATED COUNT: 19.1 % (ref 11.5–14.5)
EST. AVERAGE GLUCOSE BLD GHB EST-MCNC: 140 MG/DL
GLUCOSE SERPL-MCNC: 118 MG/DL (ref 65–100)
HBA1C MFR BLD: 6.5 % (ref 4–5.6)
HCT VFR BLD AUTO: 30.8 % (ref 35–47)
HGB BLD-MCNC: 10.4 G/DL (ref 11.5–16)
IMM GRANULOCYTES # BLD AUTO: 0 K/UL (ref 0–0.04)
IMM GRANULOCYTES NFR BLD AUTO: 0 % (ref 0–0.5)
LYMPHOCYTES # BLD: 1.4 K/UL (ref 0.8–3.5)
LYMPHOCYTES NFR BLD: 18 % (ref 12–49)
MCH RBC QN AUTO: 24.1 PG (ref 26–34)
MCHC RBC AUTO-ENTMCNC: 33.8 G/DL (ref 30–36.5)
MCV RBC AUTO: 71.3 FL (ref 80–99)
MONOCYTES # BLD: 0.6 K/UL (ref 0–1)
MONOCYTES NFR BLD: 7 % (ref 5–13)
NEUTS SEG # BLD: 5.4 K/UL (ref 1.8–8)
NEUTS SEG NFR BLD: 71 % (ref 32–75)
NRBC # BLD: 0 K/UL (ref 0–0.01)
NRBC BLD-RTO: 0 PER 100 WBC
PLATELET # BLD AUTO: 211 K/UL (ref 150–400)
POTASSIUM SERPL-SCNC: 4.8 MMOL/L (ref 3.5–5.1)
RBC # BLD AUTO: 4.32 M/UL (ref 3.8–5.2)
SODIUM SERPL-SCNC: 142 MMOL/L (ref 136–145)
WBC # BLD AUTO: 7.6 K/UL (ref 3.6–11)

## 2024-02-23 PROCEDURE — 80048 BASIC METABOLIC PNL TOTAL CA: CPT

## 2024-02-23 PROCEDURE — 36415 COLL VENOUS BLD VENIPUNCTURE: CPT

## 2024-02-23 PROCEDURE — 85025 COMPLETE CBC W/AUTO DIFF WBC: CPT

## 2024-02-23 PROCEDURE — 83036 HEMOGLOBIN GLYCOSYLATED A1C: CPT

## 2024-02-23 PROCEDURE — 93005 ELECTROCARDIOGRAM TRACING: CPT | Performed by: OTOLARYNGOLOGY

## 2024-02-23 RX ORDER — CARVEDILOL 12.5 MG/1
12.5 TABLET ORAL 2 TIMES DAILY WITH MEALS
COMMUNITY

## 2024-02-23 RX ORDER — SPIRONOLACTONE 25 MG/1
12.5 TABLET ORAL DAILY
COMMUNITY

## 2024-02-23 RX ORDER — LEVOTHYROXINE SODIUM 0.15 MG/1
150 TABLET ORAL DAILY
COMMUNITY

## 2024-02-23 RX ORDER — FUROSEMIDE 20 MG/1
20 TABLET ORAL 2 TIMES DAILY
COMMUNITY

## 2024-02-23 RX ORDER — ATORVASTATIN CALCIUM 20 MG/1
20 TABLET, FILM COATED ORAL DAILY
COMMUNITY

## 2024-02-23 RX ORDER — ASPIRIN 81 MG/1
81 TABLET ORAL DAILY
Status: ON HOLD | COMMUNITY
End: 2024-03-01 | Stop reason: HOSPADM

## 2024-02-23 RX ORDER — PREGABALIN 100 MG/1
100 CAPSULE ORAL 2 TIMES DAILY
COMMUNITY

## 2024-02-23 RX ORDER — ACETAMINOPHEN 325 MG/1
650 TABLET ORAL EVERY 6 HOURS PRN
COMMUNITY

## 2024-02-23 RX ORDER — CARVEDILOL 6.25 MG/1
6.25 TABLET ORAL 2 TIMES DAILY WITH MEALS
Status: ON HOLD | COMMUNITY
End: 2024-03-01 | Stop reason: HOSPADM

## 2024-02-23 RX ORDER — LEVOTHYROXINE SODIUM 0.03 MG/1
25 TABLET ORAL DAILY
COMMUNITY

## 2024-02-23 RX ORDER — ALLOPURINOL 100 MG/1
100 TABLET ORAL DAILY
COMMUNITY

## 2024-02-23 ASSESSMENT — PAIN DESCRIPTION - LOCATION: LOCATION: KNEE

## 2024-02-23 NOTE — PERIOP NOTE
PATIENT GIVEN SURGICAL SITE INFECTION FAQ HANDOUT AND HAND WASHING TIP SHEET. PREOP INSTRUCTIONS REVIEWED AND PATIENT VERBALIZES UNDERSTANDING OF INSTRUCTIONS. PATIENT HAS BEEN GIVEN THE OPPORTUNITY TO ASK ADDITIONAL QUESTIONS.    PATIENT HAS CARDIAC CLEARANCE FROM HER CARDIOLOGIST DR. OLIVEIRA ON CHART ALONG WITH LAST OV NOTE AND EKG.

## 2024-02-23 NOTE — PERIOP NOTE
[facility]  PREOPERATIVE INSTRUCTIONS    Surgery Date:   2/29/24     Your surgeon's office or [facility] staff will call you between 4 PM- 8 PM the day before surgery with your arrival time. If your surgery is on a Monday, you will receive a call the preceding Friday. If your surgeon;s office has given you arrival time, then go by that time.    Please report to [facility] Patient Access/Admitting on the 1st floor.  Bring your insurance card, photo identification, and any copayment ( if applicable).   If you are going home the same day of your surgery, you must have a responsible adult to drive you home. You need to have a responsible adult to stay with you the first 24 hours after surgery and you should not drive a car for 24 hours following your surgery.  If you are being admitted to the hospital, please leave personal belongings/luggage in your car until you have an assigned hospital room number.  Nothing to eat or drink after midnight the night before surgery. This includes no water, gum, mints, coffee, juice, etc.  Please note special instructions, if applicable, below for medications.  Do NOT drink alcohol or smoke 24 hours before surgery. STOP smoking for 14 days prior as it helps with breathing and healing after surgery.  Please wear comfortable clothes. Wear glasses instead of contacts. We ask that all money and jewelry and valuables to be left at home. Wear no makeup, particularly mascara the day of surgery.  All body piercings, rings, and jewelry need to be removed and left at home. Remove all nail polish except for clear. Please wear your hair loose or down. Please no pony-tails, buns, or any metal hair accessories. You may wear deodorant, unless having breast surgery. Do not shave any body area within 24 hours of your surgery.  Please follow all instructions to avoid any potential surgical cancellation.  Should your physical condition change, (i.e. fever, cold, flu, etc.) please notify your

## 2024-02-25 LAB
EKG ATRIAL RATE: 63 BPM
EKG DIAGNOSIS: NORMAL
EKG P AXIS: 79 DEGREES
EKG P-R INTERVAL: 188 MS
EKG Q-T INTERVAL: 424 MS
EKG QRS DURATION: 84 MS
EKG QTC CALCULATION (BAZETT): 433 MS
EKG R AXIS: 8 DEGREES
EKG T AXIS: 32 DEGREES
EKG VENTRICULAR RATE: 63 BPM

## 2024-02-25 PROCEDURE — 93010 ELECTROCARDIOGRAM REPORT: CPT | Performed by: SPECIALIST

## 2024-02-26 NOTE — PERIOP NOTE
ML ON  FOR CLINICAL ASSISTANT TO DR. COYNE.  ADVISED OF ABN PAT LABS IN MESSAGE AND LEFT CALL BACK #    BUN 52  CREATININE 1.80  GFR 27    PAT LABS ALSO ROUTED TO PCP VIA CC.

## 2024-02-28 RX ORDER — LIDOCAINE HYDROCHLORIDE 10 MG/ML
1 INJECTION, SOLUTION INFILTRATION; PERINEURAL
Status: CANCELLED | OUTPATIENT
Start: 2024-02-28 | End: 2024-02-29

## 2024-02-28 RX ORDER — FENTANYL CITRATE 50 UG/ML
100 INJECTION, SOLUTION INTRAMUSCULAR; INTRAVENOUS
Status: CANCELLED | OUTPATIENT
Start: 2024-02-28 | End: 2024-02-29

## 2024-02-28 RX ORDER — SODIUM CHLORIDE 0.9 % (FLUSH) 0.9 %
5-40 SYRINGE (ML) INJECTION PRN
Status: CANCELLED | OUTPATIENT
Start: 2024-02-28

## 2024-02-28 RX ORDER — SODIUM CHLORIDE, SODIUM LACTATE, POTASSIUM CHLORIDE, CALCIUM CHLORIDE 600; 310; 30; 20 MG/100ML; MG/100ML; MG/100ML; MG/100ML
INJECTION, SOLUTION INTRAVENOUS CONTINUOUS
Status: CANCELLED | OUTPATIENT
Start: 2024-02-28

## 2024-02-28 RX ORDER — ACETAMINOPHEN 500 MG
1000 TABLET ORAL ONCE
Status: CANCELLED | OUTPATIENT
Start: 2024-02-28 | End: 2024-02-28

## 2024-02-28 RX ORDER — SODIUM CHLORIDE 0.9 % (FLUSH) 0.9 %
5-40 SYRINGE (ML) INJECTION EVERY 12 HOURS SCHEDULED
Status: CANCELLED | OUTPATIENT
Start: 2024-02-28

## 2024-02-28 RX ORDER — MIDAZOLAM HYDROCHLORIDE 2 MG/2ML
2 INJECTION, SOLUTION INTRAMUSCULAR; INTRAVENOUS
Status: CANCELLED | OUTPATIENT
Start: 2024-02-28 | End: 2024-02-29

## 2024-02-28 RX ORDER — SODIUM CHLORIDE 9 MG/ML
INJECTION, SOLUTION INTRAVENOUS PRN
Status: CANCELLED | OUTPATIENT
Start: 2024-02-28

## 2024-02-29 ENCOUNTER — HOSPITAL ENCOUNTER (OUTPATIENT)
Facility: HOSPITAL | Age: 89
Setting detail: OBSERVATION
Discharge: HOME | End: 2024-03-01
Attending: OTOLARYNGOLOGY | Admitting: OTOLARYNGOLOGY
Payer: MEDICARE

## 2024-02-29 ENCOUNTER — ANESTHESIA EVENT (OUTPATIENT)
Facility: HOSPITAL | Age: 89
End: 2024-02-29
Payer: MEDICARE

## 2024-02-29 ENCOUNTER — ANESTHESIA (OUTPATIENT)
Facility: HOSPITAL | Age: 89
End: 2024-02-29
Payer: MEDICARE

## 2024-02-29 DIAGNOSIS — K11.8 PAROTID MASS: Primary | ICD-10-CM

## 2024-02-29 LAB
GLUCOSE BLD STRIP.AUTO-MCNC: 116 MG/DL (ref 65–117)
GLUCOSE BLD STRIP.AUTO-MCNC: 121 MG/DL (ref 65–117)
GLUCOSE BLD STRIP.AUTO-MCNC: 376 MG/DL (ref 65–117)
SERVICE CMNT-IMP: ABNORMAL
SERVICE CMNT-IMP: ABNORMAL
SERVICE CMNT-IMP: NORMAL

## 2024-02-29 PROCEDURE — 88307 TISSUE EXAM BY PATHOLOGIST: CPT

## 2024-02-29 PROCEDURE — 2500000003 HC RX 250 WO HCPCS: Performed by: OTOLARYNGOLOGY

## 2024-02-29 PROCEDURE — G0378 HOSPITAL OBSERVATION PER HR: HCPCS

## 2024-02-29 PROCEDURE — 7100000000 HC PACU RECOVERY - FIRST 15 MIN: Performed by: OTOLARYNGOLOGY

## 2024-02-29 PROCEDURE — 3700000000 HC ANESTHESIA ATTENDED CARE: Performed by: OTOLARYNGOLOGY

## 2024-02-29 PROCEDURE — 3700000001 HC ADD 15 MINUTES (ANESTHESIA): Performed by: OTOLARYNGOLOGY

## 2024-02-29 PROCEDURE — 6370000000 HC RX 637 (ALT 250 FOR IP): Performed by: OTOLARYNGOLOGY

## 2024-02-29 PROCEDURE — 6360000002 HC RX W HCPCS: Performed by: NURSE ANESTHETIST, CERTIFIED REGISTERED

## 2024-02-29 PROCEDURE — 3600000012 HC SURGERY LEVEL 2 ADDTL 15MIN: Performed by: OTOLARYNGOLOGY

## 2024-02-29 PROCEDURE — 3600000002 HC SURGERY LEVEL 2 BASE: Performed by: OTOLARYNGOLOGY

## 2024-02-29 PROCEDURE — 82962 GLUCOSE BLOOD TEST: CPT

## 2024-02-29 PROCEDURE — C1713 ANCHOR/SCREW BN/BN,TIS/BN: HCPCS | Performed by: OTOLARYNGOLOGY

## 2024-02-29 PROCEDURE — 7100000001 HC PACU RECOVERY - ADDTL 15 MIN: Performed by: OTOLARYNGOLOGY

## 2024-02-29 PROCEDURE — L0150 CERV SEMI-RIG ADJ MOLDED CHN: HCPCS | Performed by: OTOLARYNGOLOGY

## 2024-02-29 PROCEDURE — 2709999900 HC NON-CHARGEABLE SUPPLY: Performed by: OTOLARYNGOLOGY

## 2024-02-29 PROCEDURE — 2580000003 HC RX 258: Performed by: NURSE ANESTHETIST, CERTIFIED REGISTERED

## 2024-02-29 PROCEDURE — 2500000003 HC RX 250 WO HCPCS: Performed by: NURSE ANESTHETIST, CERTIFIED REGISTERED

## 2024-02-29 RX ORDER — OXYCODONE HYDROCHLORIDE 5 MG/1
5 TABLET ORAL
Status: DISCONTINUED | OUTPATIENT
Start: 2024-02-29 | End: 2024-02-29 | Stop reason: HOSPADM

## 2024-02-29 RX ORDER — SPIRONOLACTONE 25 MG/1
12.5 TABLET ORAL DAILY
Status: DISCONTINUED | OUTPATIENT
Start: 2024-02-29 | End: 2024-03-01 | Stop reason: HOSPADM

## 2024-02-29 RX ORDER — LIDOCAINE HYDROCHLORIDE 20 MG/ML
INJECTION, SOLUTION EPIDURAL; INFILTRATION; INTRACAUDAL; PERINEURAL PRN
Status: DISCONTINUED | OUTPATIENT
Start: 2024-02-29 | End: 2024-02-29 | Stop reason: SDUPTHER

## 2024-02-29 RX ORDER — ATORVASTATIN CALCIUM 10 MG/1
20 TABLET, FILM COATED ORAL DAILY
Status: DISCONTINUED | OUTPATIENT
Start: 2024-02-29 | End: 2024-03-01 | Stop reason: HOSPADM

## 2024-02-29 RX ORDER — FENTANYL CITRATE 50 UG/ML
INJECTION, SOLUTION INTRAMUSCULAR; INTRAVENOUS PRN
Status: DISCONTINUED | OUTPATIENT
Start: 2024-02-29 | End: 2024-02-29 | Stop reason: SDUPTHER

## 2024-02-29 RX ORDER — SODIUM CHLORIDE, SODIUM LACTATE, POTASSIUM CHLORIDE, CALCIUM CHLORIDE 600; 310; 30; 20 MG/100ML; MG/100ML; MG/100ML; MG/100ML
INJECTION, SOLUTION INTRAVENOUS CONTINUOUS PRN
Status: DISCONTINUED | OUTPATIENT
Start: 2024-02-29 | End: 2024-02-29 | Stop reason: SDUPTHER

## 2024-02-29 RX ORDER — LIDOCAINE HYDROCHLORIDE AND EPINEPHRINE 10; 10 MG/ML; UG/ML
INJECTION, SOLUTION INFILTRATION; PERINEURAL PRN
Status: DISCONTINUED | OUTPATIENT
Start: 2024-02-29 | End: 2024-02-29 | Stop reason: HOSPADM

## 2024-02-29 RX ORDER — LEVOTHYROXINE SODIUM 0.03 MG/1
25 TABLET ORAL DAILY
Status: DISCONTINUED | OUTPATIENT
Start: 2024-02-29 | End: 2024-03-01 | Stop reason: DRUGHIGH

## 2024-02-29 RX ORDER — FUROSEMIDE 20 MG/1
20 TABLET ORAL 2 TIMES DAILY
Status: DISCONTINUED | OUTPATIENT
Start: 2024-02-29 | End: 2024-03-01 | Stop reason: HOSPADM

## 2024-02-29 RX ORDER — CARVEDILOL 6.25 MG/1
6.25 TABLET ORAL 2 TIMES DAILY WITH MEALS
Status: DISCONTINUED | OUTPATIENT
Start: 2024-02-29 | End: 2024-03-01 | Stop reason: DRUGHIGH

## 2024-02-29 RX ORDER — ACETAMINOPHEN 325 MG/1
650 TABLET ORAL EVERY 6 HOURS PRN
Status: DISCONTINUED | OUTPATIENT
Start: 2024-02-29 | End: 2024-03-01 | Stop reason: HOSPADM

## 2024-02-29 RX ORDER — CARVEDILOL 6.25 MG/1
12.5 TABLET ORAL 2 TIMES DAILY WITH MEALS
Status: DISCONTINUED | OUTPATIENT
Start: 2024-02-29 | End: 2024-03-01 | Stop reason: HOSPADM

## 2024-02-29 RX ORDER — TRAMADOL HYDROCHLORIDE 50 MG/1
50 TABLET ORAL EVERY 6 HOURS PRN
Status: DISCONTINUED | OUTPATIENT
Start: 2024-02-29 | End: 2024-03-01 | Stop reason: HOSPADM

## 2024-02-29 RX ORDER — PROCHLORPERAZINE EDISYLATE 5 MG/ML
5 INJECTION INTRAMUSCULAR; INTRAVENOUS
Status: DISCONTINUED | OUTPATIENT
Start: 2024-02-29 | End: 2024-02-29 | Stop reason: HOSPADM

## 2024-02-29 RX ORDER — PHENYLEPHRINE HCL IN 0.9% NACL 0.4MG/10ML
SYRINGE (ML) INTRAVENOUS PRN
Status: DISCONTINUED | OUTPATIENT
Start: 2024-02-29 | End: 2024-02-29 | Stop reason: SDUPTHER

## 2024-02-29 RX ORDER — SODIUM CHLORIDE 0.9 % (FLUSH) 0.9 %
5-40 SYRINGE (ML) INJECTION PRN
Status: DISCONTINUED | OUTPATIENT
Start: 2024-02-29 | End: 2024-02-29 | Stop reason: HOSPADM

## 2024-02-29 RX ORDER — HYDRALAZINE HYDROCHLORIDE 20 MG/ML
10 INJECTION INTRAMUSCULAR; INTRAVENOUS
Status: DISCONTINUED | OUTPATIENT
Start: 2024-02-29 | End: 2024-02-29 | Stop reason: HOSPADM

## 2024-02-29 RX ORDER — FENTANYL CITRATE 50 UG/ML
25 INJECTION, SOLUTION INTRAMUSCULAR; INTRAVENOUS EVERY 5 MIN PRN
Status: DISCONTINUED | OUTPATIENT
Start: 2024-02-29 | End: 2024-02-29 | Stop reason: HOSPADM

## 2024-02-29 RX ORDER — EPHEDRINE SULFATE 50 MG/ML
INJECTION INTRAVENOUS PRN
Status: DISCONTINUED | OUTPATIENT
Start: 2024-02-29 | End: 2024-02-29 | Stop reason: SDUPTHER

## 2024-02-29 RX ORDER — ROCURONIUM BROMIDE 10 MG/ML
INJECTION, SOLUTION INTRAVENOUS PRN
Status: DISCONTINUED | OUTPATIENT
Start: 2024-02-29 | End: 2024-02-29 | Stop reason: SDUPTHER

## 2024-02-29 RX ORDER — INSULIN GLARGINE 100 [IU]/ML
30 INJECTION, SOLUTION SUBCUTANEOUS 2 TIMES DAILY
Status: DISCONTINUED | OUTPATIENT
Start: 2024-02-29 | End: 2024-03-01 | Stop reason: HOSPADM

## 2024-02-29 RX ORDER — ALLOPURINOL 100 MG/1
100 TABLET ORAL DAILY
Status: DISCONTINUED | OUTPATIENT
Start: 2024-02-29 | End: 2024-03-01 | Stop reason: HOSPADM

## 2024-02-29 RX ORDER — EPINEPHRINE NASAL SOLUTION 1 MG/ML
SOLUTION NASAL PRN
Status: DISCONTINUED | OUTPATIENT
Start: 2024-02-29 | End: 2024-02-29 | Stop reason: HOSPADM

## 2024-02-29 RX ORDER — ONDANSETRON 2 MG/ML
INJECTION INTRAMUSCULAR; INTRAVENOUS PRN
Status: DISCONTINUED | OUTPATIENT
Start: 2024-02-29 | End: 2024-02-29 | Stop reason: SDUPTHER

## 2024-02-29 RX ORDER — DEXAMETHASONE SODIUM PHOSPHATE 4 MG/ML
INJECTION, SOLUTION INTRA-ARTICULAR; INTRALESIONAL; INTRAMUSCULAR; INTRAVENOUS; SOFT TISSUE PRN
Status: DISCONTINUED | OUTPATIENT
Start: 2024-02-29 | End: 2024-02-29 | Stop reason: SDUPTHER

## 2024-02-29 RX ORDER — SUCCINYLCHOLINE/SOD CL,ISO/PF 200MG/10ML
SYRINGE (ML) INTRAVENOUS PRN
Status: DISCONTINUED | OUTPATIENT
Start: 2024-02-29 | End: 2024-02-29 | Stop reason: SDUPTHER

## 2024-02-29 RX ORDER — PREGABALIN 50 MG/1
100 CAPSULE ORAL 2 TIMES DAILY
Status: DISCONTINUED | OUTPATIENT
Start: 2024-02-29 | End: 2024-03-01 | Stop reason: HOSPADM

## 2024-02-29 RX ORDER — ONDANSETRON 2 MG/ML
4 INJECTION INTRAMUSCULAR; INTRAVENOUS
Status: DISCONTINUED | OUTPATIENT
Start: 2024-02-29 | End: 2024-02-29 | Stop reason: HOSPADM

## 2024-02-29 RX ORDER — SODIUM CHLORIDE 0.9 % (FLUSH) 0.9 %
5-40 SYRINGE (ML) INJECTION EVERY 12 HOURS SCHEDULED
Status: DISCONTINUED | OUTPATIENT
Start: 2024-02-29 | End: 2024-02-29 | Stop reason: HOSPADM

## 2024-02-29 RX ORDER — DEXTROSE MONOHYDRATE 100 MG/ML
INJECTION, SOLUTION INTRAVENOUS CONTINUOUS PRN
Status: DISCONTINUED | OUTPATIENT
Start: 2024-02-29 | End: 2024-03-01 | Stop reason: HOSPADM

## 2024-02-29 RX ORDER — SODIUM CHLORIDE 9 MG/ML
INJECTION, SOLUTION INTRAVENOUS PRN
Status: DISCONTINUED | OUTPATIENT
Start: 2024-02-29 | End: 2024-02-29 | Stop reason: HOSPADM

## 2024-02-29 RX ORDER — ONDANSETRON 4 MG/1
4 TABLET, ORALLY DISINTEGRATING ORAL EVERY 8 HOURS PRN
Status: DISCONTINUED | OUTPATIENT
Start: 2024-02-29 | End: 2024-03-01 | Stop reason: HOSPADM

## 2024-02-29 RX ORDER — HYDROMORPHONE HYDROCHLORIDE 1 MG/ML
0.5 INJECTION, SOLUTION INTRAMUSCULAR; INTRAVENOUS; SUBCUTANEOUS EVERY 5 MIN PRN
Status: DISCONTINUED | OUTPATIENT
Start: 2024-02-29 | End: 2024-02-29 | Stop reason: HOSPADM

## 2024-02-29 RX ADMIN — FENTANYL CITRATE 50 MCG: 50 INJECTION, SOLUTION INTRAMUSCULAR; INTRAVENOUS at 12:41

## 2024-02-29 RX ADMIN — PROPOFOL 20 MG: 10 INJECTION, EMULSION INTRAVENOUS at 15:29

## 2024-02-29 RX ADMIN — EPHEDRINE SULFATE 5 MG: 50 INJECTION INTRAVENOUS at 13:14

## 2024-02-29 RX ADMIN — TRAMADOL HYDROCHLORIDE 50 MG: 50 TABLET ORAL at 23:46

## 2024-02-29 RX ADMIN — INSULIN GLARGINE 30 UNITS: 100 INJECTION, SOLUTION SUBCUTANEOUS at 21:16

## 2024-02-29 RX ADMIN — SODIUM CHLORIDE, POTASSIUM CHLORIDE, SODIUM LACTATE AND CALCIUM CHLORIDE: 600; 310; 30; 20 INJECTION, SOLUTION INTRAVENOUS at 12:41

## 2024-02-29 RX ADMIN — PROPOFOL 20 MG: 10 INJECTION, EMULSION INTRAVENOUS at 15:20

## 2024-02-29 RX ADMIN — PHENYLEPHRINE HYDROCHLORIDE 100 MCG/MIN: 10 INJECTION INTRAVENOUS at 12:48

## 2024-02-29 RX ADMIN — PROPOFOL 20 MG: 10 INJECTION, EMULSION INTRAVENOUS at 15:27

## 2024-02-29 RX ADMIN — ONDANSETRON 4 MG: 4 TABLET, ORALLY DISINTEGRATING ORAL at 23:47

## 2024-02-29 RX ADMIN — ROCURONIUM BROMIDE 5 MG: 10 SOLUTION INTRAVENOUS at 12:41

## 2024-02-29 RX ADMIN — PROPOFOL 110 MG: 10 INJECTION, EMULSION INTRAVENOUS at 12:41

## 2024-02-29 RX ADMIN — ACETAMINOPHEN 650 MG: 325 TABLET ORAL at 21:16

## 2024-02-29 RX ADMIN — PROPOFOL 30 MG: 10 INJECTION, EMULSION INTRAVENOUS at 15:17

## 2024-02-29 RX ADMIN — DEXAMETHASONE SODIUM PHOSPHATE 4 MG: 4 INJECTION, SOLUTION INTRAMUSCULAR; INTRAVENOUS at 12:58

## 2024-02-29 RX ADMIN — Medication 80 MCG: at 12:48

## 2024-02-29 RX ADMIN — FENTANYL CITRATE 25 MCG: 50 INJECTION, SOLUTION INTRAMUSCULAR; INTRAVENOUS at 13:47

## 2024-02-29 RX ADMIN — EPHEDRINE SULFATE 10 MG: 50 INJECTION INTRAVENOUS at 14:50

## 2024-02-29 RX ADMIN — Medication 140 MG: at 12:43

## 2024-02-29 RX ADMIN — PREGABALIN 100 MG: 50 CAPSULE ORAL at 21:17

## 2024-02-29 RX ADMIN — EPHEDRINE SULFATE 10 MG: 50 INJECTION INTRAVENOUS at 12:51

## 2024-02-29 RX ADMIN — EPHEDRINE SULFATE 5 MG: 50 INJECTION INTRAVENOUS at 13:58

## 2024-02-29 RX ADMIN — SODIUM CHLORIDE 2000 MG: 9 INJECTION, SOLUTION INTRAVENOUS at 12:52

## 2024-02-29 RX ADMIN — ONDANSETRON HYDROCHLORIDE 4 MG: 2 INJECTION, SOLUTION INTRAMUSCULAR; INTRAVENOUS at 15:02

## 2024-02-29 RX ADMIN — FUROSEMIDE 20 MG: 20 TABLET ORAL at 21:16

## 2024-02-29 RX ADMIN — LIDOCAINE HYDROCHLORIDE 60 MG: 20 INJECTION, SOLUTION EPIDURAL; INFILTRATION; INTRACAUDAL; PERINEURAL at 12:41

## 2024-02-29 RX ADMIN — FENTANYL CITRATE 25 MCG: 50 INJECTION, SOLUTION INTRAMUSCULAR; INTRAVENOUS at 13:07

## 2024-02-29 ASSESSMENT — PAIN SCALES - GENERAL
PAINLEVEL_OUTOF10: 0
PAINLEVEL_OUTOF10: 5
PAINLEVEL_OUTOF10: 5
PAINLEVEL_OUTOF10: 0
PAINLEVEL_OUTOF10: 0

## 2024-02-29 ASSESSMENT — PAIN DESCRIPTION - ORIENTATION: ORIENTATION: LEFT

## 2024-02-29 ASSESSMENT — PAIN DESCRIPTION - DESCRIPTORS: DESCRIPTORS: ACHING

## 2024-02-29 ASSESSMENT — ENCOUNTER SYMPTOMS: SHORTNESS OF BREATH: 1

## 2024-02-29 ASSESSMENT — PAIN DESCRIPTION - PAIN TYPE: TYPE: SURGICAL PAIN

## 2024-02-29 ASSESSMENT — PAIN DESCRIPTION - LOCATION: LOCATION: NECK

## 2024-02-29 ASSESSMENT — PAIN - FUNCTIONAL ASSESSMENT: PAIN_FUNCTIONAL_ASSESSMENT: PREVENTS OR INTERFERES SOME ACTIVE ACTIVITIES AND ADLS

## 2024-02-29 NOTE — ANESTHESIA POSTPROCEDURE EVALUATION
Department of Anesthesiology  Postprocedure Note    Patient: Amy Clayton  MRN: [id number]  YOB: 1935  Date of evaluation: 2/29/2024    Procedure Summary       Date: 02/29/24 Room / Location: [facility] ASU  / [facility] AMBULATORY OR    Anesthesia Start: 1231 Anesthesia Stop: 1552    Procedure: REMOVAL OF LEFT PAROTID GLAND/TUMOR (Left: Face) Diagnosis:       Parotid mass      (Parotid mass [K11.8])    Surgeons: Fredrick Connor MD Responsible Provider: Kobe Cooper MD    Anesthesia Type: General ASA Status: 3            Anesthesia Type: General    Andrea Phase I: Andrea Score: 10    Andrea Phase II:      Anesthesia Post Evaluation    Patient location during evaluation: PACU  Patient participation: complete - patient participated  Level of consciousness: responsive to verbal stimuli and sleepy but conscious  Pain score: 2  Airway patency: patent  Cardiovascular status: blood pressure returned to baseline  Respiratory status: acceptable  Hydration status: stable  Comments: +Post-Anesthesia Evaluation and Assessment    Patient: Amy Clayton MRN: [id number]  SSN: [id number]   YOB: 1935  Age: 88 y.o.  Sex: female          Cardiovascular Function/Vital Signs    BP (!) 163/69   Pulse 57   Temp (!) 96.2 °F (35.7 °C) (Axillary)   Resp 13   Ht 1.6 m (5' 3\")   Wt 106.1 kg (234 lb)   SpO2 97%   BMI 41.45 kg/m²     Patient is status post Procedure(s):  REMOVAL OF LEFT PAROTID GLAND/TUMOR.    Nausea/Vomiting: Controlled.    Postoperative hydration reviewed and adequate.    Pain:      Managed.    Neurological Status:       At baseline.    Mental Status and Level of Consciousness: Arousable.    Pulmonary Status:       Adequate oxygenation and airway patent.    Complications related to anesthesia: None    Post-anesthesia assessment completed. No concerns.    I have evaluated the patient and the patient is stable and ready to be discharged from PACU .    Signed By: Kobe Cooper MD

## 2024-02-29 NOTE — ANESTHESIA PRE PROCEDURE
medications:    No current facility-administered medications for this encounter.       Allergies:    Allergies   Allergen Reactions   • Toradol [Ketorolac Tromethamine] Hives       Problem List:    Patient Active Problem List   Diagnosis Code   • Chronic renal insufficiency N18.9   • DM (diabetes mellitus) ([facility]) E11.9   • Anemia D64.9   • Hyperlipidemia E78.5   • CHF (congestive heart failure) ([facility]) I50.9   • Malignant hypertension I10   • Acute hypoxemic respiratory failure ([facility]) J96.01       Past Medical History:        Diagnosis Date   • Diabetes mellitus (HCC)    • Gout    • History of shortness of breath     SEES CARDIOLOGIST, DR. OLIVEIRA   • [place] (hard of hearing)    • Hyperlipidemia    • Hypertension    • MI (myocardial infarction) ([facility]) 2004    SEES DR. OLIVEIRA - CARDIOVASCULAR   • Stroke ([facility])     IN LEFT EYE - GLASSES HELP WITH VISION NOW, WAS SEEING DOUBLE   • Thyroid disease        Past Surgical History:        Procedure Laterality Date   • ANKLE SURGERY Right    • CATARACT REMOVAL Bilateral    • OTHER SURGICAL HISTORY      PAROTID TUMOR BIOPSY   • THYROIDECTOMY         Social History:    Social History     Tobacco Use   • Smoking status: Never   • Smokeless tobacco: Never   Substance Use Topics   • Alcohol use: Not Currently                                Counseling given: Not Answered      Vital Signs (Current): There were no vitals filed for this visit.                                           BP Readings from Last 3 Encounters:   02/23/24 (!) 174/67       NPO Status: Time of last liquid consumption: 0500                        Time of last solid consumption: 1700                        Date of last liquid consumption: 02/29/24                        Date of last solid food consumption: 02/28/24    BMI:   Wt Readings from Last 3 Encounters:   02/23/24 106.1 kg (234 lb)     There is no height or weight on file to calculate BMI.    CBC:   Lab Results   Component Value Date/Time    WBC 7.6 02/23/2024

## 2024-02-29 NOTE — H&P
Otolaryngology, Head and Neck Surgery      The H&P is on paper and scanned into the chart.  No changes. I discussed the risks of facial nerve injury, complications of anesthesia, bleeding, heart attack, cerebrovascular accident (stroke), and death.  Pt's questions are answered. Pt agrees to proceed with surgery.    Fredrick Connor MD

## 2024-02-29 NOTE — OP NOTE
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 General endotracheal anesthesia  was given.  The nerve monitor was placed on the patient's face.  The  marginal mandibular branch and the zygomatic branches were both monitored.  It was working properly.  A standard parotid incision was marked over the  patient's Left preauricular area and down the neck and 10 ml of 1%  lidocaine with 1:100,000 epinephrine was injected into the skin and  subcutaneous tissues of the marked incision.  The patient was prepped and  draped in standard surgical fashion.  A pre-procedural time out was done.    A 15-blade scalpel was used to sharply incise the skin and subcutaneous  tissue along the marked incision.  Skin flaps were elevated over the  parotid gland.  The dissection plane was in the SMAS above the capsule of  the parotid gland.  Next the parotid was dissected off of the  sternocleidomastoid muscle and off of the external auditory canal.  The  gland was retracted anteriorly and the gland was dissected off subsequently  the digastric muscle and then the main trunk of the facial nerve was  identified.  The main trunk of the facial nerve was identified and traced  out.  The gland was dissected off the main trunk of the facial nerve  anteriorly.    Next the pes anserinus was identified and the gland was dissected off of  the inferior branch of  to a bulb and a pressure dressing was placed around the  wound.    The patient was turned back over to the anesthesia staff, awoken in the  operating room and transferred to the recovery room awake, alert, and in  very good condition.      EBL: <30ml        Fredrick Connor MD  2/29/2024  3:45 PM

## 2024-02-29 NOTE — PERIOP NOTE
TRANSFER - OUT REPORT:    Verbal report given to Harika Clayton  being transferred to [facility] for routine post-op       Report consisted of patient's Situation, Background, Assessment and   Recommendations(SBAR).     Information from the following report(s) Nurse Handoff Report, Adult Overview, Surgery Report, Intake/Output, and MAR was reviewed with the receiving nurse.           Lines:   Peripheral IV 02/29/24 Left Forearm (Active)   Site Assessment Clean, dry & intact 02/29/24 1601   Line Status Infusing 02/29/24 1601   Phlebitis Assessment No symptoms 02/29/24 1601   Infiltration Assessment 0 02/29/24 1601   Dressing Status Clean, dry & intact 02/29/24 1601   Dressing Type Transparent 02/29/24 1601        Opportunity for questions and clarification was provided.      Patient transported with:  Chart, IV, pt belongings.

## 2024-03-01 VITALS
BODY MASS INDEX: 41.46 KG/M2 | TEMPERATURE: 97.7 F | OXYGEN SATURATION: 99 % | WEIGHT: 234 LBS | HEIGHT: 63 IN | DIASTOLIC BLOOD PRESSURE: 85 MMHG | RESPIRATION RATE: 20 BRPM | SYSTOLIC BLOOD PRESSURE: 153 MMHG | HEART RATE: 64 BPM

## 2024-03-01 LAB
GLUCOSE BLD STRIP.AUTO-MCNC: 252 MG/DL (ref 65–117)
SERVICE CMNT-IMP: ABNORMAL

## 2024-03-01 PROCEDURE — 6370000000 HC RX 637 (ALT 250 FOR IP): Performed by: OTOLARYNGOLOGY

## 2024-03-01 PROCEDURE — G0378 HOSPITAL OBSERVATION PER HR: HCPCS

## 2024-03-01 PROCEDURE — 82962 GLUCOSE BLOOD TEST: CPT

## 2024-03-01 RX ORDER — ONDANSETRON 4 MG/1
4 TABLET, ORALLY DISINTEGRATING ORAL EVERY 8 HOURS PRN
Qty: 10 TABLET | Refills: 0 | Status: SHIPPED | OUTPATIENT
Start: 2024-03-01 | End: 2024-03-08

## 2024-03-01 RX ORDER — TRAMADOL HYDROCHLORIDE 50 MG/1
50 TABLET ORAL EVERY 6 HOURS PRN
Qty: 12 TABLET | Refills: 0 | Status: SHIPPED | OUTPATIENT
Start: 2024-03-01 | End: 2024-03-06

## 2024-03-01 RX ORDER — CEPHALEXIN 500 MG/1
500 CAPSULE ORAL 3 TIMES DAILY
Qty: 9 CAPSULE | Refills: 0 | Status: SHIPPED | OUTPATIENT
Start: 2024-03-01 | End: 2024-03-04

## 2024-03-01 RX ADMIN — PREGABALIN 100 MG: 50 CAPSULE ORAL at 09:32

## 2024-03-01 RX ADMIN — ALLOPURINOL 100 MG: 100 TABLET ORAL at 09:32

## 2024-03-01 RX ADMIN — INSULIN GLARGINE 30 UNITS: 100 INJECTION, SOLUTION SUBCUTANEOUS at 09:37

## 2024-03-01 RX ADMIN — ATORVASTATIN CALCIUM 20 MG: 10 TABLET, FILM COATED ORAL at 09:31

## 2024-03-01 RX ADMIN — ACETAMINOPHEN 650 MG: 325 TABLET ORAL at 09:43

## 2024-03-01 RX ADMIN — FUROSEMIDE 20 MG: 20 TABLET ORAL at 09:35

## 2024-03-01 RX ADMIN — SPIRONOLACTONE 12.5 MG: 25 TABLET ORAL at 09:32

## 2024-03-01 ASSESSMENT — PAIN SCALES - GENERAL
PAINLEVEL_OUTOF10: 5
PAINLEVEL_OUTOF10: 5

## 2024-03-01 ASSESSMENT — PAIN DESCRIPTION - LOCATION
LOCATION: HEAD
LOCATION: NECK

## 2024-03-01 ASSESSMENT — PAIN DESCRIPTION - DESCRIPTORS
DESCRIPTORS: ACHING
DESCRIPTORS: ACHING

## 2024-03-01 NOTE — PROGRESS NOTES
Attempting to get correct dosing of pt's home meds of Coreg and levothyroxine. Both meds are ordered in two different doses but also on PTA med list both meds are written as two different doses. Pt dose not know what the dose is for either med and dose not have her medications here with her. Attempted to call pt's pharmacy[facility] in [place] to get doses but they are not open at this time.  Pt reports that she had taken her levothyroxine this morning but had not taken the Carvedilol.

## 2024-03-01 NOTE — PROGRESS NOTES
Otolaryngology, Head and Neck Surgery        The patient is post operative day 1 from left parotidectomy.  she is doing well and is tolerating a diet and the pain is under control.   The patient denies any chest pain or shortness of breath.          Current Facility-Administered Medications   Medication Dose Route Frequency    acetaminophen (TYLENOL) tablet 650 mg  650 mg Oral Q6H PRN    atorvastatin (LIPITOR) tablet 20 mg  20 mg Oral Daily    carvedilol (COREG) tablet 12.5 mg  12.5 mg Oral BID WC    carvedilol (COREG) tablet 6.25 mg  6.25 mg Oral BID WC    furosemide (LASIX) tablet 20 mg  20 mg Oral BID    insulin glargine (LANTUS) injection vial 30 Units  30 Units SubCUTAneous BID    levothyroxine (SYNTHROID) tablet 150 mcg  150 mcg Oral Daily    levothyroxine (SYNTHROID) tablet 25 mcg  25 mcg Oral Daily    pregabalin (LYRICA) capsule 100 mg  100 mg Oral BID    spironolactone (ALDACTONE) tablet 12.5 mg  12.5 mg Oral Daily    allopurinol (ZYLOPRIM) tablet 100 mg  100 mg Oral Daily    ondansetron (ZOFRAN-ODT) disintegrating tablet 4 mg  4 mg Oral Q8H PRN    traMADol (ULTRAM) tablet 50 mg  50 mg Oral Q6H PRN    glucose chewable tablet 16 g  4 tablet Oral PRN    dextrose bolus 10% 125 mL  125 mL IntraVENous PRN    Or    dextrose bolus 10% 250 mL  250 mL IntraVENous PRN    glucagon injection 1 mg  1 mg SubCUTAneous PRN    dextrose 10 % infusion   IntraVENous Continuous PRN       Recent Results (from the past 24 hour(s))   POCT Glucose    Collection Time: 02/29/24 12:13 PM   Result Value Ref Range    POC Glucose 116 65 - 117 mg/dL    Performed by: TORI April    POCT Glucose    Collection Time: 02/29/24  3:55 PM   Result Value Ref Range    POC Glucose 121 (H) 65 - 117 mg/dL    Performed by: BRAD Galeas RN    POCT Glucose    Collection Time: 02/29/24  9:29 PM   Result Value Ref Range    POC Glucose 376 (H) 65 - 117 mg/dL    Performed by: DOMINICK Swain RN          Physical Exam:  BP (!) 172/62   Pulse 63   Temp  97.2 °F (36.2 °C) (Oral)   Resp 18   Ht 1.6 m (5' 3\")   Wt 106.1 kg (234 lb)   SpO2 (!) 8%   BMI 41.45 kg/m²     Intake/Output Summary (Last 24 hours) at 3/1/2024 0643  Last data filed at 2/29/2024 1900  Gross per 24 hour   Intake 800 ml   Output 20 ml   Net 780 ml       The patient is a well developed, well nourished adult in no distress    Neck: incision intact, no swelling or increased erythema or induration  Face: some weakness of marginal mandibular nerve      Chest: Clear to auscultation bilaterally.  No wheezes or crackles  Cardiovascular: Regular rate and rhythm    Lower extremities: no calf tenderness      Assessment:         Post op day 1 from left parotidectomy.  Doing well.  Labile blood pressure but she did come in the hospital with this.      Plan:  OK to d/c home today.  Resume normal diet and activities.  Will go back on home meds and ff/u with pcp as needed for blood pressure issues.  F/u with me in 3 days for drain removal.    Fredrick Connor MD

## 2024-03-01 NOTE — PROGRESS NOTES
Discharge instructions reviewed with patient and family member.  Family member shown how to empty Daniel. Incision line well approximated but oozing blood. Clot noted. DSD applied.  Dressing dry and intact at discharge. Dressings and measuring cup supplied. Stocking net given  to keep dressing to lt neck in place. Old head wrap with patient  to take home/ wash.

## 2024-03-01 NOTE — DISCHARGE INSTRUCTIONS
Pt may remove facial dressing today, March 1st ,when she gets home.  She should wear the facial dressing at night while sleeping.  Apply antibiotic ointment to incision twice daily.  Keep incision dry until drain is removed.  Record drain output twice a day-- once in the morning and once at night.      Call physician for increased swelling or redness at the incision site.

## (undated) DEVICE — MAGNETIC INSTR DRAPE 20X16: Brand: MEDLINE INDUSTRIES, INC.

## (undated) DEVICE — GOWN,SIRUS,FABRNF,XL,20/CS: Brand: MEDLINE

## (undated) DEVICE — SUTURE ETHLN SZ 5-0 L18IN NONABSORBABLE BLK P-3 L13MM 3/8 698G

## (undated) DEVICE — SUPPORT FACE L FOR 27IN EAR CHEEK CHIN E FOR FACIOPLASTY

## (undated) DEVICE — DRAIN SURG W10XL20CM SIL SMOOTH FLAT 3/4 PERF DBL WRP

## (undated) DEVICE — SOLUTION IRRIG 1000ML 0.9% SOD CHL USP POUR PLAS BTL

## (undated) DEVICE — GARMENT,MEDLINE,DVT,INT,CALF,MED, GEN2: Brand: MEDLINE

## (undated) DEVICE — APPLIER CLP M L11IN TI MULT RNG HNDL 30 CLP STR LIGACLP

## (undated) DEVICE — PREMIUM WET SKIN PREP TRAY: Brand: MEDLINE INDUSTRIES, INC.

## (undated) DEVICE — SPONGE: SPECIALTY PEANUT XR 100/CS: Brand: MEDICAL ACTION INDUSTRIES

## (undated) DEVICE — GLOVE SURG SZ 8 L12IN FNGR THK94MIL STD WHT LTX FREE

## (undated) DEVICE — PENCIL ES CRD L10FT HND SWCHING ROCK SWCH W/ EDGE COAT BLDE

## (undated) DEVICE — APPLIER CLP L9.375IN APER 2.1MM CLS L3.8MM 20 SM TI CLP

## (undated) DEVICE — SUTURE PERMAHAND SZ 2-0 L18IN NONABSORBABLE BLK L26MM PS 1588H

## (undated) DEVICE — STIMULATOR NERVE DISP

## (undated) DEVICE — SUTURE NONABSORBABLE MONOFILAMENT 3-0 PS-1 18 IN BLK ETHILON 1663H

## (undated) DEVICE — Z DISC USE 2752451 SUTURE VCRL SZ 3-0 L27IN ABSRB UD FS-2 L19MM 1/2 CIR J423H

## (undated) DEVICE — ENT-SMH: Brand: MEDLINE INDUSTRIES, INC.

## (undated) DEVICE — SUTURE ETHLN 4-0 L18IN NONABSORBABLE UD PS-2 L19MM 3/8 CIR 1611G